# Patient Record
Sex: FEMALE | Race: WHITE | NOT HISPANIC OR LATINO | Employment: PART TIME | URBAN - METROPOLITAN AREA
[De-identification: names, ages, dates, MRNs, and addresses within clinical notes are randomized per-mention and may not be internally consistent; named-entity substitution may affect disease eponyms.]

---
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
## 2024-01-03 NOTE — TELEPHONE ENCOUNTER
Called and spoke with pt  - reports having tonsillectomy on 12/26 without complication  - since surgery she is recovering ok, but reports difficulty swallowing and pain  - currently taking oxycodone, which has led to constipation  - denies any abdominal pain, nausea or vomiting   - no fever, chills or infection concerns  - has post op appt on 1/19  - ND for stelara on 1/4  - will discuss with Dr. Mcdaniel and call pt back

## 2024-01-16 ENCOUNTER — LAB VISIT (OUTPATIENT)
Dept: LAB | Facility: HOSPITAL | Age: 19
End: 2024-01-16
Attending: NURSE PRACTITIONER
Payer: COMMERCIAL

## 2024-01-16 DIAGNOSIS — D50.0 IRON DEFICIENCY ANEMIA DUE TO CHRONIC BLOOD LOSS: ICD-10-CM

## 2024-01-16 LAB
BASOPHILS # BLD AUTO: 0.08 K/UL (ref 0–0.2)
BASOPHILS NFR BLD: 0.9 % (ref 0–1.9)
DIFFERENTIAL METHOD BLD: ABNORMAL
EOSINOPHIL # BLD AUTO: 0.3 K/UL (ref 0–0.5)
EOSINOPHIL NFR BLD: 3.1 % (ref 0–8)
ERYTHROCYTE [DISTWIDTH] IN BLOOD BY AUTOMATED COUNT: 14.3 % (ref 11.5–14.5)
FERRITIN SERPL-MCNC: 173 NG/ML (ref 20–300)
HCT VFR BLD AUTO: 39.6 % (ref 37–48.5)
HGB BLD-MCNC: 12.9 G/DL (ref 12–16)
IMM GRANULOCYTES # BLD AUTO: 0.03 K/UL (ref 0–0.04)
IMM GRANULOCYTES NFR BLD AUTO: 0.3 % (ref 0–0.5)
IRON SERPL-MCNC: 83 UG/DL (ref 30–160)
LYMPHOCYTES # BLD AUTO: 2.5 K/UL (ref 1–4.8)
LYMPHOCYTES NFR BLD: 29.1 % (ref 18–48)
MCH RBC QN AUTO: 28.7 PG (ref 27–31)
MCHC RBC AUTO-ENTMCNC: 32.6 G/DL (ref 32–36)
MCV RBC AUTO: 88 FL (ref 82–98)
MONOCYTES # BLD AUTO: 1 K/UL (ref 0.3–1)
MONOCYTES NFR BLD: 11.6 % (ref 4–15)
NEUTROPHILS # BLD AUTO: 4.7 K/UL (ref 1.8–7.7)
NEUTROPHILS NFR BLD: 55 % (ref 38–73)
NRBC BLD-RTO: 0 /100 WBC
PLATELET # BLD AUTO: 742 K/UL (ref 150–450)
PMV BLD AUTO: 9.3 FL (ref 9.2–12.9)
RBC # BLD AUTO: 4.49 M/UL (ref 4–5.4)
SATURATED IRON: 19 % (ref 20–50)
TOTAL IRON BINDING CAPACITY: 431 UG/DL (ref 250–450)
TRANSFERRIN SERPL-MCNC: 291 MG/DL (ref 200–375)
WBC # BLD AUTO: 8.59 K/UL (ref 3.9–12.7)

## 2024-01-16 PROCEDURE — 36415 COLL VENOUS BLD VENIPUNCTURE: CPT | Mod: PO | Performed by: NURSE PRACTITIONER

## 2024-01-16 PROCEDURE — 82728 ASSAY OF FERRITIN: CPT | Performed by: NURSE PRACTITIONER

## 2024-01-16 PROCEDURE — 83540 ASSAY OF IRON: CPT | Performed by: NURSE PRACTITIONER

## 2024-01-16 PROCEDURE — 85025 COMPLETE CBC W/AUTO DIFF WBC: CPT | Performed by: NURSE PRACTITIONER

## 2024-01-18 ENCOUNTER — LAB VISIT (OUTPATIENT)
Dept: LAB | Facility: HOSPITAL | Age: 19
End: 2024-01-18
Attending: NURSE PRACTITIONER
Payer: COMMERCIAL

## 2024-01-18 ENCOUNTER — TELEPHONE (OUTPATIENT)
Dept: GASTROENTEROLOGY | Facility: CLINIC | Age: 19
End: 2024-01-18
Payer: COMMERCIAL

## 2024-01-18 ENCOUNTER — OFFICE VISIT (OUTPATIENT)
Dept: HEMATOLOGY/ONCOLOGY | Facility: CLINIC | Age: 19
End: 2024-01-18
Payer: COMMERCIAL

## 2024-01-18 VITALS
HEART RATE: 83 BPM | BODY MASS INDEX: 23.19 KG/M2 | WEIGHT: 144.31 LBS | DIASTOLIC BLOOD PRESSURE: 68 MMHG | OXYGEN SATURATION: 97 % | SYSTOLIC BLOOD PRESSURE: 105 MMHG | TEMPERATURE: 97 F | HEIGHT: 66 IN

## 2024-01-18 DIAGNOSIS — D75.839 THROMBOCYTOSIS: Primary | ICD-10-CM

## 2024-01-18 DIAGNOSIS — D75.839 THROMBOCYTOSIS: ICD-10-CM

## 2024-01-18 DIAGNOSIS — D69.3 CHRONIC ITP (IDIOPATHIC THROMBOCYTOPENIA): ICD-10-CM

## 2024-01-18 DIAGNOSIS — D50.0 IRON DEFICIENCY ANEMIA DUE TO CHRONIC BLOOD LOSS: ICD-10-CM

## 2024-01-18 PROCEDURE — 99214 OFFICE O/P EST MOD 30 MIN: CPT | Mod: S$GLB,,, | Performed by: NURSE PRACTITIONER

## 2024-01-18 PROCEDURE — 99999 PR PBB SHADOW E&M-EST. PATIENT-LVL V: CPT | Mod: PBBFAC,,, | Performed by: NURSE PRACTITIONER

## 2024-01-18 PROCEDURE — 0027U JAK2 GENE TRGT SEQ ALYS: CPT | Performed by: NURSE PRACTITIONER

## 2024-01-18 PROCEDURE — 81206 BCR/ABL1 GENE MAJOR BP: CPT | Performed by: NURSE PRACTITIONER

## 2024-01-18 PROCEDURE — 3008F BODY MASS INDEX DOCD: CPT | Mod: CPTII,S$GLB,, | Performed by: NURSE PRACTITIONER

## 2024-01-18 PROCEDURE — 81339 MPL GENE SEQ ALYS EXON 10: CPT | Performed by: NURSE PRACTITIONER

## 2024-01-18 PROCEDURE — 81219 CALR GENE COM VARIANTS: CPT | Performed by: NURSE PRACTITIONER

## 2024-01-18 PROCEDURE — 1159F MED LIST DOCD IN RCRD: CPT | Mod: CPTII,S$GLB,, | Performed by: NURSE PRACTITIONER

## 2024-01-18 PROCEDURE — 3074F SYST BP LT 130 MM HG: CPT | Mod: CPTII,S$GLB,, | Performed by: NURSE PRACTITIONER

## 2024-01-18 PROCEDURE — 1160F RVW MEDS BY RX/DR IN RCRD: CPT | Mod: CPTII,S$GLB,, | Performed by: NURSE PRACTITIONER

## 2024-01-18 PROCEDURE — 3078F DIAST BP <80 MM HG: CPT | Mod: CPTII,S$GLB,, | Performed by: NURSE PRACTITIONER

## 2024-01-18 PROCEDURE — 36415 COLL VENOUS BLD VENIPUNCTURE: CPT | Performed by: NURSE PRACTITIONER

## 2024-01-18 PROCEDURE — 81270 JAK2 GENE: CPT | Performed by: NURSE PRACTITIONER

## 2024-01-18 NOTE — TELEPHONE ENCOUNTER
Per Dr. Mcdaniel:  - CBC/CMP Feb/Mar  - ok to schedule virtual Feb/Mar after labs    Immunization record updated to reflect Shingrix and Influenza vaccines.

## 2024-01-18 NOTE — PROGRESS NOTES
Subjective:       Patient ID: Mini Marcus is a 18 y.o. female.    Chief Complaint: review labs.     HPI:a 18 y.o. female with Chron's disease (routine GI follow up. Colonoscopy UTD), presenting for follow-up with history of iron deficiency anemia and chronic ITP. She endorses menorrhagia. She follows with OBGYN on estrogen containing OCP     She has not been taking oral iron due to intolerance.  She has received IV iron therapy in May 2023 noting hives improved with premedication Benadryl.  No respiratory symptoms noted.    In regards to her ITP, Several prior treatments for ITP including IVIG, steroids, Nplate and in December 2022 underwent splenectomy; she is had post splenectomy complications including recurrent infection/strep throat. She has had follow-up with Infectious Disease for post splenectomy vaccinations. She is already seen allergy and immunology no clear immunoglobulin deficiency or history of IVIG.  She also reports follow up with ENT with plans for possible tonsillectomy given recurrent strep throat. Now s/p tonsillectomy 12/2023     Of note patient had bmbx 5/2022 for evaluation of persistent thrombocytopenia. It showed a normocellular marrow (80% total cellularity) with no increased blasts and trilineage hematopoiesis with left-shifted granulopoiesis, minimal erythroid hyperplasia, and marked megakaryocytic hyperplasia without significant dysplasia .      Patient has had recent persistent thrombocytosis thought likely to be multifactorial status post splenectomy, inflammation with Crohn's disease and or iron deficiency.     Today she presents for follow up of her iron deficiency and blood cell count review. Today she feels well overall.     Social History     Socioeconomic History    Marital status: Single   Tobacco Use    Smoking status: Never    Smokeless tobacco: Never   Substance and Sexual Activity    Alcohol use: Never    Drug use: Never    Sexual activity: Never       Past Medical  History:   Diagnosis Date    Chronic constipation     Chronic ITP (idiopathic thrombocytopenia)     Crohn's disease (ileum)     GERD (gastroesophageal reflux disease)     Inflammatory bowel disease     Long-term current use of intravenous immunoglobulin (IVIG)     PONV (postoperative nausea and vomiting)        Family History   Problem Relation Age of Onset    No Known Problems Mother     Asthma Father     Hypertension Father     Gout Father     No Known Problems Brother     Hyperlipidemia Maternal Grandmother     Diabetes Paternal Grandmother     Hypertension Paternal Grandfather     No Known Problems Brother        Past Surgical History:   Procedure Laterality Date    CHOLECYSTECTOMY      patient denies this procedure    COLONOSCOPY N/A 02/13/2023    Procedure: COLONOSCOPY;  Surgeon: Bertram Mcdaniel MD;  Location: [facility] (4TH FLR);  Service: Endoscopy;  Laterality: N/A;  inst via portal  precall confirmed 2/6 EB    COLONOSCOPY N/A 12/19/2023    Procedure: COLONOSCOPY;  Surgeon: Bertram Mcdaniel MD;  [facility]  Service: Endoscopy;  Laterality: N/A;  Ref By: Dr. JENNY Mcdaniel, Arleneep, instr. ent via portal. AC  Prep Specifications:Standard prep - - miralax daily starting 1 week prior and low residue diet week before, Suprep  12/12-lvm for precall-MS  12/12-pt confirmed appt-KPvt    INTRALUMINAL GASTROINTESTINAL TRACT IMAGING VIA CAPSULE N/A 05/05/2021    Procedure: IMAGING PROCEDURE, GI TRACT, INTRALUMINAL, VIA CAPSULE;  Surgeon: Mitchel Humphries RN;  Location: [facility];  Service: Endoscopy;  Laterality: N/A;    ROBOT-ASSISTED SURGICAL REMOVAL OF SPLEEN USING DA MARYANA XI N/A 12/15/2022    Procedure: XI ROBOTIC SPLENECTOMY;  Surgeon: Mitchel Simmons Jr., MD;  Location: [address];  Service: General;  Laterality: N/A;  CONSENT IN AM    SPINE SURGERY      rods    SPLENECTOMY      12/16/2022    TONSILLECTOMY, ADENOIDECTOMY      TONSILLECTOMY, ADENOIDECTOMY Bilateral  12/26/2023    Procedure: TONSILLECTOMY AND ADENOIDECTOMY;  Surgeon: Keisha Ventura MD;  Location: [facility];  Service: ENT;  Laterality: Bilateral;  send tonsils for path       Review of Systems   Constitutional:  Negative for appetite change, chills, fatigue, fever and unexpected weight change.   HENT:  Negative for congestion, mouth sores, nosebleeds, sore throat, trouble swallowing and voice change.    Respiratory:  Negative for cough, chest tightness, shortness of breath and wheezing.    Cardiovascular:  Negative for chest pain and leg swelling.   Gastrointestinal:  Negative for abdominal distention, abdominal pain, blood in stool, constipation, diarrhea, nausea and vomiting.   Genitourinary:  Negative for difficulty urinating, dysuria and hematuria.   Musculoskeletal:  Negative for arthralgias, back pain and myalgias.   Skin:  Negative for pallor, rash and wound.   Allergic/Immunologic: Positive for immunocompromised state.   Neurological:  Negative for dizziness, syncope, weakness and headaches.   Hematological:  Negative for adenopathy. Does not bruise/bleed easily.   Psychiatric/Behavioral:  The patient is nervous/anxious.          Medication List with Changes/Refills   Current Medications    ASPIRIN 81 MG CAP    Take 81 mg by mouth.    AZATHIOPRINE (IMURAN) 50 MG TAB    Take 3 tablets (150 mg total) by mouth once daily.    AZELASTINE (ASTELIN) 137 MCG (0.1 %) NASAL SPRAY    1 spray (137 mcg total) by Nasal route 2 (two) times daily.    CLINDAMYCIN (CLEOCIN T) 1 % EXTERNAL SOLUTION    APPLY EVERY DAY use as spot treatment    CLONAZEPAM (KLONOPIN) 0.5 MG TABLET    Take 1 tablet (0.5 mg total) by mouth daily as needed for Anxiety.    ESCITALOPRAM OXALATE (LEXAPRO) 10 MG TABLET    Take 1 tablet (10 mg total) by mouth once daily.    ESOMEPRAZOLE (NEXIUM) 20 MG CAPSULE    Take 20 mg by mouth.    FLUTICASONE PROPIONATE (FLONASE) 50 MCG/ACTUATION NASAL SPRAY    1 spray (50 mcg total) by Each Nostril route 2 (two)  times a day.    GALCANEZUMAB-GNLM 120 MG/ML PNIJ    Inject 120 mg into the skin every 28 days. 240 mg loading dose (administered as two consecutive injections of 120 mg each)    LEVOCETIRIZINE (XYZAL) 5 MG TABLET    Take 1 tablet (5 mg total) by mouth every evening.    LINACLOTIDE (LINZESS) 145 MCG CAP CAPSULE    Take 1 capsule (145 mcg total) by mouth before breakfast.    LINACLOTIDE (LINZESS) 72 MCG CAP CAPSULE    Take 1 capsule (72 mcg total) by mouth before breakfast.    MUPIROCIN (BACTROBAN) 2 % OINTMENT    APPLY on excoriated lesions TWICE DAILY    NORETHINDRONE-ETHINYL ESTRADIOL-IRON (MICROGESTIN FE1.5/30) 1.5 MG-30 MCG (21)/75 MG (7) TABLET    Take 1 tablet by mouth every evening.    OMEPRAZOLE (PRILOSEC) 20 MG CAPSULE    Take 20 mg by mouth once daily.    ONDANSETRON (ZOFRAN ODT) 4 MG TBDL    Take 1 tablet (4 mg total) by mouth every 6 (six) hours as needed (nausea).    ONDANSETRON (ZOFRAN-ODT) 4 MG TBDL    Take 1 tablet (4 mg total) by mouth every 6 (six) hours as needed (Nausea).    QUETIAPINE (SEROQUEL) 50 MG TABLET    Take 1 tablet (50 mg total) by mouth every evening.    SULFACETAMIDE SODIUM-SULFUR 10-5 % (W/W) CLSR    APPLY a small amount to skin once a day]    UBROGEPANT (UBRELVY) 100 MG TABLET    Take 1 tablet (100 mg total) by mouth as needed for Migraine (Max 2-3 times a week). If symptoms persist or return, may repeat dose after 2 hours. Maximum: 200 mg per 24 hours    USTEKINUMAB (STELARA) 90 MG/ML SYRG SYRINGE    Inject 1 mL (90 mg total) into the skin every 8 weeks.    ZILXI 1.5 % FOAM    Apply topically every evening.     Objective:     Vitals:    01/18/24 1439   BP: 105/68   Pulse: 83   Temp: 97.2 °F (36.2 °C)     Lab Results   Component Value Date    WBC 8.59 01/16/2024    HGB 12.9 01/16/2024    HCT 39.6 01/16/2024    MCV 88 01/16/2024     (H) 01/16/2024       BMP  Lab Results   Component Value Date     12/06/2023    K 3.7 12/06/2023     12/06/2023    CO2 22 (L)  12/06/2023    BUN 10 12/06/2023    CREATININE 0.8 12/06/2023    CALCIUM 9.4 12/06/2023    ANIONGAP 10 12/06/2023    EGFRNORACEVR >60.0 12/06/2023     Lab Results   Component Value Date    ALT 18 12/06/2023    AST 16 12/06/2023    ALKPHOS 74 12/06/2023    BILITOT 0.3 12/06/2023         Physical Exam  Vitals reviewed.   Constitutional:       Appearance: She is well-developed.   HENT:      Head: Normocephalic.      Right Ear: External ear normal.      Left Ear: External ear normal.      Nose: Nose normal.   Eyes:      General: Lids are normal. No scleral icterus.        Right eye: No discharge.         Left eye: No discharge.      Conjunctiva/sclera: Conjunctivae normal.   Neck:      Thyroid: No thyroid mass.   Cardiovascular:      Rate and Rhythm: Normal rate and regular rhythm.      Heart sounds: Normal heart sounds.   Pulmonary:      Effort: Pulmonary effort is normal. No respiratory distress.   Abdominal:      General: There is no distension.   Genitourinary:     Comments: deferred  Musculoskeletal:         General: Normal range of motion.      Cervical back: Normal range of motion.   Skin:     General: Skin is warm and dry.   Neurological:      Mental Status: She is alert and oriented to person, place, and time.   Psychiatric:         Speech: Speech normal.         Behavior: Behavior normal. Behavior is cooperative.         Thought Content: Thought content normal.        Assessment:     Problem List Items Addressed This Visit          Hematology    Chronic ITP (idiopathic thrombocytopenia)     S/p splenectomy given had failed several prior treatments. Now with thrombocytosis previously improving s/p Injectafer IV iron infusions x 2 administered 5/2023    Most recent CBC with worsening thrombocytosis. Recent tonsillectomy 12/2023 possibly contributing. She notes menorrhagia. Saturated iron 19%. Intolerant of oral iron due to constipation, chronic GI issues     Trial Feraheme x 1. Check Jak2, MPN, BCR-ABL. Plan for  8 weeks f/u with cbc, iron, ferritin, cmp. If no improvement in thrombocytosis at follow up consider repeat bmbx.     Of note prior bmbx 5/2022 5/31/22 for evaluation of thrombocytopenia. It showed a normocellular marrow (80% total cellularity) with no increased blasts and trilineage hematopoiesis with left-shifted granulopoiesis, minimal erythroid hyperplasia, and marked megakaryocytic hyperplasia without significant dysplasia .              Oncology    Iron deficiency anemia due to chronic blood loss    Relevant Orders    CBC Auto Differential    Comprehensive Metabolic Panel    Iron and TIBC    Ferritin    Thrombocytosis - Primary     H/o ITP now with thrombocytosis s/p splenectomy. H/o KASHIF. ? Multifactorial but will r/o myeloproliferative disorder. Labs today. Communicate results via patient portal. Replete iron. Continue ASA 81 mg PO daily. F/u 8 weeks with cbc cmp iron ferritin. Repeat bmbx PRN         Relevant Orders    BCR/ABL, p210, Quant, Monitor, blood    MPN (JAK2 V617F)WITH REFLEX TO CALR,MPL    JAK2 Exon 12 And Other Non-V617 Mutation Detection, Bld    CBC Auto Differential    Comprehensive Metabolic Panel    Iron and TIBC    Ferritin         Plan:     Thrombocytosis  -     BCR/ABL, p210, Quant, Monitor, blood; Future; Expected date: 01/18/2024  -     MPN (JAK2 V617F)WITH REFLEX TO CALR,MPL; Future; Expected date: 01/18/2024  -     JAK2 Exon 12 And Other Non-V617 Mutation Detection, Bld; Future; Expected date: 01/18/2024  -     CBC Auto Differential; Future; Expected date: 01/18/2024  -     Comprehensive Metabolic Panel; Future; Expected date: 01/18/2024  -     Iron and TIBC; Future; Expected date: 01/18/2024  -     Ferritin; Future; Expected date: 01/18/2024    Chronic ITP (idiopathic thrombocytopenia)    Iron deficiency anemia due to chronic blood loss  -     CBC Auto Differential; Future; Expected date: 01/18/2024  -     Comprehensive Metabolic Panel; Future; Expected date: 01/18/2024  -     Iron  and TIBC; Future; Expected date: 01/18/2024  -     Ferritin; Future; Expected date: 01/18/2024            Med Onc Chart Routing      Follow up with physician    Follow up with JOSEPH 2 months.   Infusion scheduling note    Injection scheduling note    Labs CBC, ferritin, iron and TIBC and CMP   Scheduling:  Preferred lab:  Lab interval:  1-2 days prior   Imaging None      Pharmacy appointment No pharmacy appointment needed      Other referrals       No additional referrals needed         SUKH MoralesC

## 2024-01-18 NOTE — ASSESSMENT & PLAN NOTE
H/o ITP now with thrombocytosis s/p splenectomy. H/o KASHIF. ? Multifactorial but will r/o myeloproliferative disorder. Labs today. Communicate results via patient portal. Replete iron. Continue ASA 81 mg PO daily. F/u 8 weeks with cbc cmp iron ferritin. Repeat bmbx PRN

## 2024-01-18 NOTE — ASSESSMENT & PLAN NOTE
S/p splenectomy given had failed several prior treatments. Now with thrombocytosis previously improving s/p Injectafer IV iron infusions x 2 administered 5/2023    Most recent CBC with worsening thrombocytosis. Recent tonsillectomy 12/2023 possibly contributing. She notes menorrhagia. Saturated iron 19%. Intolerant of oral iron due to constipation, chronic GI issues     Trial Feraheme x 1. Check Jak2, MPN, BCR-ABL. Plan for 8 weeks f/u with cbc, iron, ferritin, cmp. If no improvement in thrombocytosis at follow up consider repeat bmbx.     Of note prior bmbx 5/2022 5/31/22 for evaluation of thrombocytopenia. It showed a normocellular marrow (80% total cellularity) with no increased blasts and trilineage hematopoiesis with left-shifted granulopoiesis, minimal erythroid hyperplasia, and marked megakaryocytic hyperplasia without significant dysplasia .

## 2024-01-19 ENCOUNTER — OFFICE VISIT (OUTPATIENT)
Dept: OTOLARYNGOLOGY | Facility: CLINIC | Age: 19
End: 2024-01-19
Payer: COMMERCIAL

## 2024-01-19 DIAGNOSIS — Z90.89 S/P TONSILLECTOMY AND ADENOIDECTOMY: Primary | ICD-10-CM

## 2024-01-19 PROCEDURE — 99024 POSTOP FOLLOW-UP VISIT: CPT | Mod: S$GLB,,, | Performed by: NURSE PRACTITIONER

## 2024-01-19 PROCEDURE — 1159F MED LIST DOCD IN RCRD: CPT | Mod: CPTII,S$GLB,, | Performed by: NURSE PRACTITIONER

## 2024-01-19 PROCEDURE — 1160F RVW MEDS BY RX/DR IN RCRD: CPT | Mod: CPTII,S$GLB,, | Performed by: NURSE PRACTITIONER

## 2024-01-19 PROCEDURE — 99999 PR PBB SHADOW E&M-EST. PATIENT-LVL III: CPT | Mod: PBBFAC,,, | Performed by: NURSE PRACTITIONER

## 2024-01-19 NOTE — PROGRESS NOTES
Subjective     Patient ID: Mini Marcus is a 18 y.o. female.    Chief Complaint: No chief complaint on file.    HPI  Patient had adenotonsillectomy done on 12/26/2023 by Dr. Ventura for recurrent strep tonsillitis. Patient is immune-compromised following spleenectomy and has an immunologist in [place]. Patient states she has recovered well without event. Still feels some PND/mucus sensation in the back of her throat. Some sneezing. She takes Xyzal for ARS symptoms.     Review of Systems   Constitutional: Negative.    HENT: Negative.     Eyes: Negative.    Respiratory: Negative.     Cardiovascular: Negative.    Gastrointestinal: Negative.    Musculoskeletal: Negative.    Integumentary:  Negative.   Neurological: Negative.    Hematological: Negative.    Psychiatric/Behavioral: Negative.            Objective     Physical Exam  Vitals and nursing note reviewed.   Constitutional:       General: She is not in acute distress.     Appearance: She is well-developed. She is not ill-appearing.   HENT:      Head: Normocephalic and atraumatic.      Right Ear: Hearing, tympanic membrane, ear canal and external ear normal. No middle ear effusion. Tympanic membrane is not erythematous.      Left Ear: Hearing, tympanic membrane, ear canal and external ear normal.  No middle ear effusion. Tympanic membrane is not erythematous.      Nose: Nose normal.      Mouth/Throat:      Tonsils: 0 on the right. 0 on the left.   Eyes:      General: Lids are normal. No scleral icterus.        Right eye: No discharge.         Left eye: No discharge.   Neck:      Trachea: Trachea normal. No tracheal deviation.   Cardiovascular:      Rate and Rhythm: Normal rate.   Pulmonary:      Effort: Pulmonary effort is normal. No respiratory distress.      Breath sounds: No stridor. No wheezing.   Musculoskeletal:         General: Normal range of motion.      Cervical back: Normal range of motion and neck supple.   Skin:     General: Skin is warm and dry.    Neurological:      Mental Status: She is alert and oriented to person, place, and time.      Coordination: Coordination normal.      Gait: Gait normal.   Psychiatric:         Attention and Perception: Attention normal.         Mood and Affect: Mood normal.         Speech: Speech normal.         Behavior: Behavior normal. Behavior is cooperative.            Assessment and Plan     1. S/P tonsillectomy and adenoidectomy      Reassured area healed, no restrictions. If recurrent infections resume, f/u with immunologist. Patient encouraged to return to clinic if symptoms worsen/persist and as needed for further ENT symptoms or concerns.          No follow-ups on file.

## 2024-01-22 LAB
BCR/ABL,P210 RESULT: NORMAL
PATH REPORT.FINAL DX SPEC: NORMAL
SPECIMEN TYPE: NORMAL

## 2024-01-23 ENCOUNTER — PATIENT MESSAGE (OUTPATIENT)
Dept: HEMATOLOGY/ONCOLOGY | Facility: CLINIC | Age: 19
End: 2024-01-23
Payer: COMMERCIAL

## 2024-01-25 ENCOUNTER — OFFICE VISIT (OUTPATIENT)
Dept: PSYCHIATRY | Facility: CLINIC | Age: 19
End: 2024-01-25
Payer: COMMERCIAL

## 2024-01-25 ENCOUNTER — PATIENT MESSAGE (OUTPATIENT)
Dept: HEMATOLOGY/ONCOLOGY | Facility: CLINIC | Age: 19
End: 2024-01-25
Payer: COMMERCIAL

## 2024-01-25 DIAGNOSIS — F41.1 GAD (GENERALIZED ANXIETY DISORDER): Primary | ICD-10-CM

## 2024-01-25 LAB
JAK2 EXON 12 MUTATION DETECTION BLOOD: NORMAL
MPNR  FINAL DIAGNOSIS: NORMAL
MPNR  SPECIMEN TYPE: NORMAL
MPNR RESULT: NORMAL
PATH REPORT.FINAL DX SPEC: NORMAL

## 2024-01-25 PROCEDURE — 99214 OFFICE O/P EST MOD 30 MIN: CPT | Mod: 95,,, | Performed by: NURSE PRACTITIONER

## 2024-01-25 PROCEDURE — 1159F MED LIST DOCD IN RCRD: CPT | Mod: CPTII,95,, | Performed by: NURSE PRACTITIONER

## 2024-01-25 PROCEDURE — 1160F RVW MEDS BY RX/DR IN RCRD: CPT | Mod: CPTII,95,, | Performed by: NURSE PRACTITIONER

## 2024-01-25 RX ORDER — HEPARIN 100 UNIT/ML
500 SYRINGE INTRAVENOUS
OUTPATIENT
Start: 2024-01-25

## 2024-01-25 RX ORDER — CLONAZEPAM 0.5 MG/1
0.5 TABLET ORAL DAILY PRN
Qty: 15 TABLET | Refills: 0 | Status: SHIPPED | OUTPATIENT
Start: 2024-01-25 | End: 2024-03-07 | Stop reason: SDUPTHER

## 2024-01-25 RX ORDER — FLUOXETINE HYDROCHLORIDE 20 MG/1
20 CAPSULE ORAL DAILY
Qty: 30 CAPSULE | Refills: 2 | Status: SHIPPED | OUTPATIENT
Start: 2024-01-25 | End: 2024-03-07 | Stop reason: SDUPTHER

## 2024-01-25 RX ORDER — SODIUM CHLORIDE 0.9 % (FLUSH) 0.9 %
10 SYRINGE (ML) INJECTION
Status: CANCELLED | OUTPATIENT
Start: 2024-01-25

## 2024-01-25 NOTE — PROGRESS NOTES
"Outpatient Psychiatry Follow-Up Visit    Clinical Status of Patient: Outpatient (Virtual))  01/25/2024    [address] [place] [address]  [phone] (M)  [phone] (H)  Visit type: Virtual visit with synchronous audio and video  Each patient to whom he or she provides medical services by telemedicine is:  (1) informed of the relationship between the physician and patient and the respective role of any other health care provider with respect to management of the patient; and (2) notified that he or she may decline to receive medical services by telemedicine and may withdraw from such care at any time.    Chief Complaint: Pt is a 19 yo F who presents today for a follow-up. Met with patient.       Interval History and Content of Current Session:  Interim Events/Subjective Report/Content of Current Session:  follow up appointment.    Pt is a 19 yo F with past psychiatric hx of "illness anxiety" who presents for follow up treatment. She is currently taking Seroquel 50mg QHS (insomnia), Klonopin 0.5mg, and started trial of Lexapro 10mg QD for anxiety last visit one month ago. Today, pt reports "I have brain fog. I feel more forgetful and I'm feeling stupid." Pt notes improved anxiety, less generalized worrying and less restlessness. She is sleeping better compared to baseline. Pt is requesting a switch to alternative SSRI at this time. She is stable and high functioning overall.           Past Psychiatric hx: Pt. is a 19 yo F with a past psychiatric hx of "illness anxiety disorder" and "mdd" presenting for initial eval and med mgmt. PT presented to me taking Zoloft 75mg QD (has been taking for around 1.5 years ago through pediatric hematologist) and denies any past med trials.  Denies hx of inpatient psych, denies past suicidal behaviors.     Pt reports suffering from chronic ITP since 7th grade and has undergone significant treatments for this. Pt notes that she recently had her spleen removed. Pt notes she " "was initially prescribed zoloft at age 17 secondary to anxiety/stress secondary to her medical diagnoses. This did have a positive impact on her mood and anxiety levels. She also notes being treated with high dose steroids over the course of a year which caused many setbacks in regards to her mental health. In addition, pt notes undergoing a spinal fusion at age 15 which was followed by a year long recovery. She d/c her Zoloft and noticed increasing depression and anxiety. Pt acknowledges that the majority of her symptoms are secondary to her extensive medical history.      Lately, she notes significant nighttime anxiety, restlessness, tension that interferes with her ability to fall and stay asleep. "I feel a huge sense of panic". Notes daily generalized, ruminative worry. Sleep is poor. "I just need to sleep. I am so exhausted. I am so tired all day."      Past Medical hx:   Past Medical History:   Diagnosis Date    Chronic constipation     Chronic ITP (idiopathic thrombocytopenia)     Crohn's disease (ileum)     GERD (gastroesophageal reflux disease)     Inflammatory bowel disease     Long-term current use of intravenous immunoglobulin (IVIG)     PONV (postoperative nausea and vomiting)         Interim hx:  Medication changes last visit:   start remeron  Anxiety: contd  Depression: denies     Denies suicidal/homicidal ideations.  Denies hopelessness/worthlessness.    Denies auditory/visual hallucinations      Susbtance use: denies      Review of Systems   PSYCHIATRIC: Pertinent items are noted in the narrative.        CONSTITUTIONAL: weight stable        M/S: no pain today         ENT: no allergies noted today        ABD: no n/v/d     Past Medical, Family and Social History: The patient's past medical, family and social history have been reviewed and updated as appropriate within the electronic medical record. See encounter notes.          Compliance: yes      Side effects: tolerates     Risk Parameters:  Patient " "reports no suicidal ideation  Patient reports no homicidal ideation  Patient reports no self-injurious behavior  Patient reports no violent behavior     Exam (detailed: at least 9 elements; comprehensive: all 15 elements)   Constitutional  Vitals:  Most recent vital signs, dated less than 90 days prior to this appointment, were reviewed.       General:  unremarkable, age appropriate, casual attire, good eye contact, good rapport       Musculoskeletal  Muscle Strength/Tone:  no flaccidity, no tremor    Gait & Station:  normal      Psychiatric                       Speech:  normal tone, normal rate, rhythm, and volume   Mood & Affect:   Euthymic, congruent, appropriate         Thought Process:   Goal directed; Linear    Associations:   intact   Thought Content:   No SI/HI, delusions, or paranoia, no AV/VH   Insight & Judgement:   Good, adequate to circumstances   Orientation:   grossly intact; alert and oriented x 4    Memory:  intact for content of interview    Language:  grossly intact, can repeat    Attention Span  : Grossly intact for content of interview   Fund of Knowledge:   intact and appropriate to age and level of education        Assessment and Diagnosis   Status/Progress: Based on the examination today, the patient's problem(s) is/are under fair control.  New problems have not been presented today. Comorbidities are not currently complicating management of the primary condition.      Impression:      Pt is a 17 yo F with past psychiatric hx of "illness anxiety" who presents for follow up treatment. She is currently taking Seroquel 50mg QHS (insomnia), Klonopin 0.5mg, and started trial of Lexapro 10mg QD for anxiety last visit one month ago. Today, pt reports "I have brain fog. I feel more forgetful and I'm feeling stupid." Pt notes improved anxiety, less generalized worrying and less restlessness. She is sleeping better compared to baseline. Pt is requesting a switch to alternative SSRI at this time. She " is stable and high functioning overall.         Diagnosis: illness anxiety disworder    Intervention/Counseling/Treatment Plan   Medication Management:      D/C Lexapro. Start Prozac 20mg QD.  Discussed potential for GI side effects, sexual dysfunction, mood destabilization, headaches    2. Cont Seroquel 50mg QHS. Typical MILAGROS's reviewed including weight gain, abnormal movements, EPS, TD, metabolic side effects.     3. Cont Klonopin 0.5mg QHS for sleep/anxiety. Discussed risk of decreased RT, sedation, addictive potential, and not to mix with alcohol.      4. Call to report any worsening of symptoms or problems with the medication. Pt instructed to go to ER with thoughts of harming self, others     5. Patient given contact # for psychotherapists at [facility] and also instructed she may check with insurance for list of providers.      6. Labs: no new orders      Return to clinic: 6 weeks - self schedule      -Spent 30min face to face with the pt; >50% time spent in counseling   -Supportive therapy and psychoeducation provided  -R/B/SE's of medications discussed with the pt who expresses understanding and chooses to take medications as prescribed.   -Pt instructed to call clinic, 911 or go to nearest emergency room if sxs worsen or pt is in   crisis. The pt expresses understanding.    Carlos Durán, NP

## 2024-01-29 RX ORDER — DIPHENHYDRAMINE HCL 25 MG
25 CAPSULE ORAL
Status: CANCELLED
Start: 2024-01-29

## 2024-01-29 RX ORDER — DIPHENHYDRAMINE HCL 25 MG
25 CAPSULE ORAL
Start: 2024-02-02

## 2024-01-29 RX ORDER — HEPARIN 100 UNIT/ML
500 SYRINGE INTRAVENOUS
OUTPATIENT
Start: 2024-02-02

## 2024-01-29 RX ORDER — SODIUM CHLORIDE 0.9 % (FLUSH) 0.9 %
10 SYRINGE (ML) INJECTION
OUTPATIENT
Start: 2024-02-02

## 2024-01-31 ENCOUNTER — PATIENT MESSAGE (OUTPATIENT)
Dept: PSYCHIATRY | Facility: CLINIC | Age: 19
End: 2024-01-31
Payer: COMMERCIAL

## 2024-02-01 ENCOUNTER — INFUSION (OUTPATIENT)
Dept: INFUSION THERAPY | Facility: HOSPITAL | Age: 19
End: 2024-02-01
Attending: INTERNAL MEDICINE
Payer: COMMERCIAL

## 2024-02-01 VITALS
DIASTOLIC BLOOD PRESSURE: 57 MMHG | SYSTOLIC BLOOD PRESSURE: 109 MMHG | RESPIRATION RATE: 16 BRPM | HEART RATE: 88 BPM | OXYGEN SATURATION: 100 % | TEMPERATURE: 98 F

## 2024-02-01 DIAGNOSIS — D50.0 IRON DEFICIENCY ANEMIA DUE TO CHRONIC BLOOD LOSS: ICD-10-CM

## 2024-02-01 DIAGNOSIS — N92.1 MENORRHAGIA WITH IRREGULAR CYCLE: Primary | ICD-10-CM

## 2024-02-01 DIAGNOSIS — D69.3 CHRONIC ITP (IDIOPATHIC THROMBOCYTOPENIA): ICD-10-CM

## 2024-02-01 PROCEDURE — 63600175 PHARM REV CODE 636 W HCPCS: Mod: JZ,JG | Performed by: NURSE PRACTITIONER

## 2024-02-01 PROCEDURE — 96375 TX/PRO/DX INJ NEW DRUG ADDON: CPT

## 2024-02-01 PROCEDURE — 96365 THER/PROPH/DIAG IV INF INIT: CPT

## 2024-02-01 PROCEDURE — 25000003 PHARM REV CODE 250: Performed by: NURSE PRACTITIONER

## 2024-02-01 RX ORDER — SODIUM CHLORIDE 0.9 % (FLUSH) 0.9 %
10 SYRINGE (ML) INJECTION
Status: DISCONTINUED | OUTPATIENT
Start: 2024-02-01 | End: 2024-02-01 | Stop reason: HOSPADM

## 2024-02-01 RX ORDER — HEPARIN 100 UNIT/ML
5 SYRINGE INTRAVENOUS
OUTPATIENT
Start: 2024-02-08

## 2024-02-01 RX ORDER — DIPHENHYDRAMINE HYDROCHLORIDE 50 MG/ML
25 INJECTION INTRAMUSCULAR; INTRAVENOUS ONCE
OUTPATIENT
Start: 2024-02-08 | End: 2024-02-08

## 2024-02-01 RX ORDER — EPINEPHRINE 0.3 MG/.3ML
0.3 INJECTION SUBCUTANEOUS ONCE AS NEEDED
OUTPATIENT
Start: 2024-02-08

## 2024-02-01 RX ORDER — SODIUM CHLORIDE 0.9 % (FLUSH) 0.9 %
10 SYRINGE (ML) INJECTION
OUTPATIENT
Start: 2024-02-08

## 2024-02-01 RX ORDER — DIPHENHYDRAMINE HYDROCHLORIDE 50 MG/ML
25 INJECTION INTRAMUSCULAR; INTRAVENOUS
Status: CANCELLED
Start: 2024-02-01

## 2024-02-01 RX ORDER — SODIUM CHLORIDE 9 MG/ML
INJECTION, SOLUTION INTRAVENOUS CONTINUOUS
OUTPATIENT
Start: 2024-02-08

## 2024-02-01 RX ORDER — DIPHENHYDRAMINE HCL 25 MG
25 CAPSULE ORAL
Status: DISCONTINUED | OUTPATIENT
Start: 2024-02-01 | End: 2024-02-01

## 2024-02-01 RX ORDER — DIPHENHYDRAMINE HYDROCHLORIDE 50 MG/ML
25 INJECTION INTRAMUSCULAR; INTRAVENOUS
Status: COMPLETED | OUTPATIENT
Start: 2024-02-01 | End: 2024-02-01

## 2024-02-01 RX ORDER — DIPHENHYDRAMINE HYDROCHLORIDE 50 MG/ML
25 INJECTION INTRAMUSCULAR; INTRAVENOUS
Start: 2024-02-02

## 2024-02-01 RX ADMIN — SODIUM CHLORIDE: 9 INJECTION, SOLUTION INTRAVENOUS at 01:02

## 2024-02-01 RX ADMIN — FERUMOXYTOL 510 MG: 510 INJECTION INTRAVENOUS at 01:02

## 2024-02-01 RX ADMIN — DIPHENHYDRAMINE HYDROCHLORIDE 25 MG: 50 INJECTION, SOLUTION INTRAMUSCULAR; INTRAVENOUS at 01:02

## 2024-02-01 NOTE — NURSING
Began to complain of a throbbing headache 20 mins post iron infusion during wait time, continued IV saline infusion and encouraged pt to increase fluid intake.  Pt denies hives , SOB, difficulty breathing or chest tightness.

## 2024-02-01 NOTE — PLAN OF CARE
Discussed plan of care with pt. Addressed any and ongoing concerns. Pt denies   Problem: Adult Inpatient Plan of Care  Goal: Plan of Care Review  Outcome: Ongoing, Progressing  Goal: Patient-Specific Goal (Individualized)  Outcome: Ongoing, Progressing  Flowsheets (Taken 2/1/2024 1342)  Anxieties, Fears or Concerns: Denies  Individualized Care Needs: Reclined position, mom at side pillow provided  Goal: Absence of Hospital-Acquired Illness or Injury  Outcome: Ongoing, Progressing  Intervention: Identify and Manage Fall Risk  Flowsheets (Taken 2/1/2024 1342)  Safety Promotion/Fall Prevention: in recliner, wheels locked  Intervention: Prevent Infection  Flowsheets (Taken 2/1/2024 1342)  Infection Prevention:   equipment surfaces disinfected   hand hygiene promoted   personal protective equipment utilized  Goal: Optimal Comfort and Wellbeing  Outcome: Ongoing, Progressing  Intervention: Provide Person-Centered Care  Flowsheets (Taken 2/1/2024 1342)  Trust Relationship/Rapport:   care explained   questions encouraged   choices provided   reassurance provided   emotional support provided   thoughts/feelings acknowledged   empathic listening provided   questions answered

## 2024-02-26 ENCOUNTER — PATIENT MESSAGE (OUTPATIENT)
Dept: NEUROLOGY | Facility: CLINIC | Age: 19
End: 2024-02-26

## 2024-02-26 ENCOUNTER — OFFICE VISIT (OUTPATIENT)
Dept: NEUROLOGY | Facility: CLINIC | Age: 19
End: 2024-02-26
Payer: COMMERCIAL

## 2024-02-26 VITALS
HEART RATE: 98 BPM | OXYGEN SATURATION: 99 % | BODY MASS INDEX: 23.3 KG/M2 | WEIGHT: 145 LBS | RESPIRATION RATE: 16 BRPM | HEIGHT: 66 IN | DIASTOLIC BLOOD PRESSURE: 75 MMHG | SYSTOLIC BLOOD PRESSURE: 109 MMHG

## 2024-02-26 DIAGNOSIS — R20.0 NUMBNESS AND TINGLING IN LEFT HAND: ICD-10-CM

## 2024-02-26 DIAGNOSIS — F41.1 GAD (GENERALIZED ANXIETY DISORDER): ICD-10-CM

## 2024-02-26 DIAGNOSIS — J32.9 SINUSITIS, UNSPECIFIED CHRONICITY, UNSPECIFIED LOCATION: ICD-10-CM

## 2024-02-26 DIAGNOSIS — F19.982 DRUG-INDUCED INSOMNIA: ICD-10-CM

## 2024-02-26 DIAGNOSIS — N92.1 MENORRHAGIA WITH IRREGULAR CYCLE: ICD-10-CM

## 2024-02-26 DIAGNOSIS — T38.0X5A ADRENAL CORTICAL STEROIDS CAUSING ADVERSE EFFECT IN THERAPEUTIC USE: ICD-10-CM

## 2024-02-26 DIAGNOSIS — G43.119 CLASSICAL MIGRAINE WITH INTRACTABLE MIGRAINE WITH AURA: Primary | ICD-10-CM

## 2024-02-26 DIAGNOSIS — Z90.81 H/O SPLENECTOMY: ICD-10-CM

## 2024-02-26 DIAGNOSIS — M41.125 ADOLESCENT IDIOPATHIC SCOLIOSIS OF THORACOLUMBAR REGION: ICD-10-CM

## 2024-02-26 DIAGNOSIS — Z87.898 HISTORY OF TRANSFUSION REACTION: ICD-10-CM

## 2024-02-26 DIAGNOSIS — K50.00 CROHN'S DISEASE OF SMALL INTESTINE WITHOUT COMPLICATION: ICD-10-CM

## 2024-02-26 DIAGNOSIS — D50.0 IRON DEFICIENCY ANEMIA DUE TO CHRONIC BLOOD LOSS: ICD-10-CM

## 2024-02-26 DIAGNOSIS — D69.3 CHRONIC ITP (IDIOPATHIC THROMBOCYTOPENIA): ICD-10-CM

## 2024-02-26 DIAGNOSIS — D75.839 THROMBOCYTOSIS: ICD-10-CM

## 2024-02-26 DIAGNOSIS — J30.1 SEASONAL ALLERGIC RHINITIS DUE TO POLLEN: ICD-10-CM

## 2024-02-26 DIAGNOSIS — R20.2 NUMBNESS AND TINGLING IN LEFT HAND: ICD-10-CM

## 2024-02-26 PROCEDURE — 1159F MED LIST DOCD IN RCRD: CPT | Mod: CPTII,S$GLB,, | Performed by: PSYCHIATRY & NEUROLOGY

## 2024-02-26 PROCEDURE — 3074F SYST BP LT 130 MM HG: CPT | Mod: CPTII,S$GLB,, | Performed by: PSYCHIATRY & NEUROLOGY

## 2024-02-26 PROCEDURE — 3078F DIAST BP <80 MM HG: CPT | Mod: CPTII,S$GLB,, | Performed by: PSYCHIATRY & NEUROLOGY

## 2024-02-26 PROCEDURE — 99214 OFFICE O/P EST MOD 30 MIN: CPT | Mod: S$GLB,,, | Performed by: PSYCHIATRY & NEUROLOGY

## 2024-02-26 PROCEDURE — 3008F BODY MASS INDEX DOCD: CPT | Mod: CPTII,S$GLB,, | Performed by: PSYCHIATRY & NEUROLOGY

## 2024-02-26 PROCEDURE — 99999 PR PBB SHADOW E&M-EST. PATIENT-LVL V: CPT | Mod: PBBFAC,,, | Performed by: PSYCHIATRY & NEUROLOGY

## 2024-02-26 RX ORDER — LASMIDITAN 100 MG/1
1 TABLET ORAL NIGHTLY PRN
Qty: 9 TABLET | Refills: 3 | Status: SHIPPED | OUTPATIENT
Start: 2024-02-26 | End: 2024-04-01 | Stop reason: SDUPTHER

## 2024-02-26 NOTE — PROGRESS NOTES
"Subjective:       Patient ID: Mini Marcus is a 18 y.o. female.    Chief Complaint: Classical migraine with intractable migraine with aura          HPI      BACKGROUND HISTORY       Medical history is extensive and included ITP,CD, Splenectomy and Scoliosis surgery.     The patient presented for evaluation of headaches. The headaches started around 2020. The headaches progress from different areas and involves different sides with a throbbing nature. It can involve the right side, the left side or both sides. The headaches is preceded by loss of peripheral vision visual aura for 30 minutes. No associated neurological deficits. .  The headaches are getting more frequent and come every day. The headaches are also getting more severe 6-8/10 headaches that cause significant morbidity. The headache is associated with nausea and light, sound, temperature, and smell sensitivity. The patient also vomits occasionally.  The patient feels "down" during the headache with no racing. Physical and emotional stressors provoke and aggravate the headache.  The headache builds up slowly towards the middle of the day or the end of the day.   The headaches are progressively getting worse and interfering with daily activity.  The headache is associated with scalp sensitivity. Lack of sleep could be a trigger of the headache.  Does endorse neck pain with radiation. No TMJ problems.  The patient denies blurry vision and ear ringing. The headache is not positional or postural but worsens with movement and Valsalva. Sleep help lessen the headache. No history of head trauma. No history of seizures. No history of smoking. No caffeine overuse. No vertigo. No blacking out.  No fever, chills, or rigors. No history of significant memory loss. No history of strokes.  The patient cannot think of food that aggravates the headache. Family history is remarkable for migraine (female side). No family history of early dementia. On 02-, " 10-, 04- [facility] so I ordered Brain MRI. Abortive therapies (tried and failed): OTC NSAIDs, Triptans (Sumatriptan-Imitrex), Zolmitriptan (Zomig) and Rizatriptan (Maxalt), Fioricet, CGRP Blockers (Nurtec) so I tried her on Ubrelvy 100 mg PO PRN. Preventative therapies (tried and failed):  AEDs (TPM, GBP), TCA (Amitriptyline), SSRIs, BB (Propranolol/inderal) so I tried her on Emgality 120 mg SQ monthly.         The patient also reports paroxysmal LUE numbness and tingling in the little finger (pinky) distribution that radiates to the neck. It was reproducible by carpal tunnel and cubital tunnel percussion. I ordered NCS/EMG LUE.      INTERVAL HISTORY     On 07- Brain MRI [facility]. Took Emgality once in  and eradicated her headaches. Ubrelvy 100 mg PO PRN was not covered and inconsistent.    The LUE numbness subsided.     Review of Systems   Constitutional:  Positive for fatigue. Negative for appetite change.   HENT:  Negative for hearing loss and tinnitus.    Eyes:  Negative for photophobia and visual disturbance.   Respiratory:  Negative for apnea and shortness of breath.    Cardiovascular:  Negative for chest pain and palpitations.   Gastrointestinal:  Positive for constipation. Negative for nausea and vomiting.   Endocrine: Negative for cold intolerance and heat intolerance.   Genitourinary:  Negative for difficulty urinating and urgency.   Musculoskeletal:  Positive for myalgias and neck pain. Negative for arthralgias, back pain, gait problem, joint swelling and neck stiffness.   Skin:  Negative for color change and rash.   Allergic/Immunologic: Positive for immunocompromised state. Negative for environmental allergies.   Neurological:  Positive for numbness and headaches. Negative for dizziness, tremors, seizures, syncope, facial asymmetry, speech difficulty, weakness and light-headedness.   Hematological:  Negative for adenopathy. Does not bruise/bleed easily.   Psychiatric/Behavioral:   Positive for dysphoric mood and sleep disturbance. Negative for agitation, behavioral problems, confusion, decreased concentration, hallucinations, self-injury and suicidal ideas. The patient is nervous/anxious. The patient is not hyperactive.                Current Outpatient Medications:     aspirin 81 mg Cap, Take 81 mg by mouth., Disp: , Rfl:     azaTHIOprine (IMURAN) 50 mg Tab, Take 3 tablets (150 mg total) by mouth once daily., Disp: 30 tablet, Rfl: 2    azelastine (ASTELIN) 137 mcg (0.1 %) nasal spray, 1 spray (137 mcg total) by Nasal route 2 (two) times daily., Disp: 30 mL, Rfl: 11    clindamycin (CLEOCIN T) 1 % external solution, APPLY EVERY DAY use as spot treatment, Disp: , Rfl:     clonazePAM (KLONOPIN) 0.5 MG tablet, Take 1 tablet (0.5 mg total) by mouth daily as needed for Anxiety., Disp: 15 tablet, Rfl: 0    esomeprazole (NEXIUM) 20 MG capsule, Take 20 mg by mouth., Disp: , Rfl:     FLUoxetine (PROZAC) 20 MG capsule, Take 1 capsule (20 mg total) by mouth once daily., Disp: 30 capsule, Rfl: 2    fluticasone propionate (FLONASE) 50 mcg/actuation nasal spray, 1 spray (50 mcg total) by Each Nostril route 2 (two) times a day., Disp: 16 g, Rfl: 11    levocetirizine (XYZAL) 5 MG tablet, Take 1 tablet (5 mg total) by mouth every evening., Disp: 90 tablet, Rfl: 3    linaCLOtide (LINZESS) 145 mcg Cap capsule, Take 1 capsule (145 mcg total) by mouth before breakfast., Disp: 30 capsule, Rfl: 0    linaCLOtide (LINZESS) 72 mcg Cap capsule, Take 1 capsule (72 mcg total) by mouth before breakfast., Disp: 30 capsule, Rfl: 5    mupirocin (BACTROBAN) 2 % ointment, APPLY on excoriated lesions TWICE DAILY, Disp: , Rfl:     omeprazole (PRILOSEC) 20 MG capsule, Take 20 mg by mouth once daily., Disp: , Rfl:     ondansetron (ZOFRAN ODT) 4 MG TbDL, Take 1 tablet (4 mg total) by mouth every 6 (six) hours as needed (nausea)., Disp: 10 tablet, Rfl: 0    ondansetron (ZOFRAN-ODT) 4 MG TbDL, Take 1 tablet (4 mg total) by mouth every  6 (six) hours as needed (Nausea)., Disp: 20 tablet, Rfl: 1    QUEtiapine (SEROQUEL) 50 MG tablet, Take 1 tablet (50 mg total) by mouth every evening., Disp: 30 tablet, Rfl: 2    sulfacetamide sodium-sulfur 10-5 % (w/w) Clsr, APPLY a small amount to skin once a day], Disp: , Rfl:     ustekinumab (STELARA) 90 mg/mL Syrg syringe, Inject 1 mL (90 mg total) into the skin every 8 weeks., Disp: 1 mL, Rfl: 2    ZILXI 1.5 % Foam, Apply topically every evening., Disp: , Rfl:     galcanezumab-gnlm 120 mg/mL PnIj, Inject 1 mL (120 mg total) into the skin every 28 days., Disp: 3 each, Rfl: 11    lasmiditan (REYVOW) tablet 100 mg, Take 1 tablet by mouth nightly as needed (Max 2-3 times a week)., Disp: 9 tablet, Rfl: 3    norethindrone-ethinyl estradiol-iron (MICROGESTIN FE1.5/30) 1.5 mg-30 mcg (21)/75 mg (7) tablet, Take 1 tablet by mouth every evening., Disp: , Rfl:   Past Medical History:   Diagnosis Date    Chronic constipation     Chronic ITP (idiopathic thrombocytopenia)     Crohn's disease (ileum)     GERD (gastroesophageal reflux disease)     Inflammatory bowel disease     Long-term current use of intravenous immunoglobulin (IVIG)     PONV (postoperative nausea and vomiting)      Past Surgical History:   Procedure Laterality Date    CHOLECYSTECTOMY      patient denies this procedure    COLONOSCOPY N/A 02/13/2023    Procedure: COLONOSCOPY;  Surgeon: Bertram Mcdaniel MD;  Location: [address]);  Service: Endoscopy;  Laterality: N/A;  inst via portal  precall confirmed 2/6 EB    COLONOSCOPY N/A 12/19/2023    Procedure: COLONOSCOPY;  Surgeon: Bertram Mcdaniel MD;  Location: [facility] ([address] [place] [address]);  Service: Endoscopy;  Laterality: N/A;  Ref By: Dr. JENNY Mcdaniel, Sergio, instr. ent via portal. AC  Prep Specifications:Standard prep - - miralax daily starting 1 week prior and low residue diet week before, Suprep  12/12-lvm for precall-MS  12/12-pt confirmed appt-KPvt    INTRALUMINAL GASTROINTESTINAL TRACT IMAGING VIA CAPSULE N/A  05/05/2021    Procedure: IMAGING PROCEDURE, GI TRACT, INTRALUMINAL, VIA CAPSULE;  Surgeon: Mitchel Humphries RN;  Location: [facility];  Service: Endoscopy;  Laterality: N/A;    ROBOT-ASSISTED SURGICAL REMOVAL OF SPLEEN USING DA MARYANA XI N/A 12/15/2022    Procedure: XI ROBOTIC SPLENECTOMY;  Surgeon: Mitchel Simmons Jr., MD;  Location: [facility] OR [address] [place] [address];  Service: General;  Laterality: N/A;  CONSENT IN AM    SPINE SURGERY      rods    SPLENECTOMY      12/16/2022    TONSILLECTOMY, ADENOIDECTOMY      TONSILLECTOMY, ADENOIDECTOMY Bilateral 12/26/2023    Procedure: TONSILLECTOMY AND ADENOIDECTOMY;  Surgeon: Keisha Ventura MD;  Location: [facility] OR;  Service: ENT;  Laterality: Bilateral;  send tonsils for path     Social History     Socioeconomic History    Marital status: Single   Tobacco Use    Smoking status: Never    Smokeless tobacco: Never   Substance and Sexual Activity    Alcohol use: Never    Drug use: Never    Sexual activity: Never             Past/Current Medical/Surgical History, Past/Current Social History, Past/Current Family History and Past/Current Medications were reviewed in detail.        Objective:           VITAL SIGNS WERE REVIEWED      GENERAL APPEARANCE:     The patient looks comfortable.    BMI 23.40    No signs of respiratory distress.    Normal breathing pattern.    No dysmorphic features    Normal eye contact.       GENERAL MEDICAL EXAM:    HEENT:  Head is atraumatic normocephalic.     FUNDUSCOPIC (OPHTHALMOSCOPIC) EXAMINATION showed no disc edema.      NECK: No JVD. No visible lesions or goiters.     CHEST-CARDIOPULMONARY: No cyanosis. No tachypnea. Normal respiratory effort.    [id number]-AYCMOPVZEP: No jaundice. No stomas or lesions. No visible hernias. No catheters.     SKIN, HAIR, NAILS: No pathognomonic skin rash.No neurofibromatosis. No visible lesions.No stigmata of autoimmune disease. No clubbing.    LIMBS: No varicose veins. No visible swelling.    MUSCULOSKELETAL: No  visible deformities.No visible lesions.           Neurologic Exam     Mental Status   Oriented to person, place, and time.   Follows 3 step commands.   Attention: normal. Concentration: normal.   Speech: speech is normal   Level of consciousness: alert  Able to name object. Able to repeat. Normal comprehension.     Cranial Nerves   Cranial nerves II through XII intact.     CN II   Visual fields full to confrontation.   Visual acuity: normal  Right visual field deficit: none  Left visual field deficit: none     CN III, IV, VI   Pupils are equal, round, and reactive to light.  Extraocular motions are normal.   Right pupil: Size: 2 mm. Shape: regular. Reactivity: brisk. Consensual response: intact. Accommodation: intact.   Left pupil: Size: 2 mm. Shape: regular. Reactivity: brisk. Consensual response: intact. Accommodation: intact.   CN III: no CN III palsy  CN VI: no CN VI palsy  Nystagmus: none   Diplopia: none  Ophthalmoparesis: none  Upgaze: normal  Downgaze: normal  Conjugate gaze: present  Vestibulo-ocular reflex: present    CN V   Facial sensation intact.   Right facial sensation deficit: none  Left facial sensation deficit: none  Jaw jerk: normal    CN VII   Facial expression full, symmetric.   Right facial weakness: none  Left facial weakness: none    CN VIII   CN VIII normal.   Hearing: intact    CN IX, X   CN IX normal.   CN X normal.   Palate: symmetric    CN XI   CN XI normal.   Right sternocleidomastoid strength: normal  Left sternocleidomastoid strength: normal  Right trapezius strength: normal  Left trapezius strength: normal    CN XII   CN XII normal.   Tongue: not atrophic  Fasciculations: absent  Tongue deviation: none    Motor Exam   Muscle bulk: normal  Overall muscle tone: normal  Right arm tone: normal  Left arm tone: normal  Right arm pronator drift: absent  Left arm pronator drift: absent  Right leg tone: normal  Left leg tone: normal    Strength   Strength 5/5 throughout.   Right neck flexion:  5/5  Left neck flexion: 5/5  Right neck extension: 5/5  Left neck extension: 5/5  Right deltoid: 5/5  Left deltoid: 5/5  Right biceps: 5/5  Left biceps: 5/5  Right triceps: 5/5  Left triceps: 5/5  Right wrist flexion: 5/5  Left wrist flexion: 5/5  Right wrist extension: 5/5  Left wrist extension: 5/5  Right interossei: 5/5  Left interossei: 5/5  Right iliopsoas: 5/5  Left iliopsoas: 5/5  Right quadriceps: 5/5  Left quadriceps: 5/5  Right hamstrin/5  Left hamstrin/5  Right glutei: 5/5  Left glutei: 5/5  Right anterior tibial: 5/5  Left anterior tibial: 5/5  Right posterior tibial: 5/5  Left posterior tibial: 5/5  Right peroneal: 5/5  Left peroneal: 5/5  Right gastroc: 5/5  Left gastroc: 5/5    Sensory Exam   Light touch normal.   Right arm light touch: normal  Left arm light touch: normal  Right leg light touch: normal  Left leg light touch: normal  Vibration normal.   Right arm vibration: normal  Left arm vibration: normal  Right leg vibration: normal  Left leg vibration: normal  Proprioception normal.   Right arm proprioception: normal  Left arm proprioception: normal  Right leg proprioception: normal  Left leg proprioception: normal  Pinprick normal.   Right arm pinprick: normal  Left arm pinprick: normal  Right leg pinprick: normal  Left leg pinprick: normal  Sensory deficit distribution on left: ulnar (The patient also reports paroxysmal LUE numbness and tingling in the little finger (pinky) distribution that radiates to the neck. It was reproducible by carpal tunnel and cubital tunnel percussion.)  Graphesthesia: normal  Stereognosis: normal    Gait, Coordination, and Reflexes     Gait  Gait: normal    Coordination   Romberg: negative  Finger to nose coordination: normal  Heel to shin coordination: normal  Tandem walking coordination: normal    Tremor   Resting tremor: absent  Intention tremor: absent  Action tremor: absent    Reflexes   Right brachioradialis: 2+  Left brachioradialis: 2+  Right biceps:  2+  Left biceps: 2+  Right triceps: 2+  Left triceps: 2+  Right patellar: 2+  Left patellar: 2+  Right achilles: 2+  Left achilles: 2+  Right plantar: normal  Left plantar: normal  Right Howell: absent  Left Howell: absent  Right ankle clonus: absent  Left ankle clonus: absent  Right pendular knee jerk: absent  Left pendular knee jerk: absent          Lab Results   Component Value Date    WBC 8.59 01/16/2024    HGB 12.9 01/16/2024    HCT 39.6 01/16/2024    MCV 88 01/16/2024     (H) 01/16/2024     Sodium   Date Value Ref Range Status   12/06/2023 138 136 - 145 mmol/L Final     Potassium   Date Value Ref Range Status   12/06/2023 3.7 3.5 - 5.1 mmol/L Final     Chloride   Date Value Ref Range Status   12/06/2023 106 95 - 110 mmol/L Final     CO2   Date Value Ref Range Status   12/06/2023 22 (L) 23 - 29 mmol/L Final     Glucose   Date Value Ref Range Status   12/06/2023 103 70 - 110 mg/dL Final     BUN   Date Value Ref Range Status   12/06/2023 10 6 - 20 mg/dL Final     Creatinine   Date Value Ref Range Status   12/06/2023 0.8 0.5 - 1.4 mg/dL Final     Calcium   Date Value Ref Range Status   12/06/2023 9.4 8.7 - 10.5 mg/dL Final     Total Protein   Date Value Ref Range Status   12/06/2023 7.3 6.0 - 8.4 g/dL Final     Albumin   Date Value Ref Range Status   12/06/2023 3.9 3.5 - 5.2 g/dL Final     Total Bilirubin   Date Value Ref Range Status   12/06/2023 0.3 0.1 - 1.0 mg/dL Final     Comment:     For infants and newborns, interpretation of results should be based  on gestational age, weight and in agreement with clinical  observations.    Premature Infant recommended reference ranges:  Up to 24 hours.............<8.0 mg/dL  Up to 48 hours............<12.0 mg/dL  3-5 days..................<15.0 mg/dL  6-29 days.................<15.0 mg/dL       Alkaline Phosphatase   Date Value Ref Range Status   12/06/2023 74 55 - 135 U/L Final     AST   Date Value Ref Range Status   12/06/2023 16 10 - 40 U/L Final     ALT    Date Value Ref Range Status   12/06/2023 18 10 - 44 U/L Final     Anion Gap   Date Value Ref Range Status   12/06/2023 10 8 - 16 mmol/L Final     eGFR if    Date Value Ref Range Status   07/18/2022 >60.0 >60 mL/min/1.73 m^2 Final     eGFR if non    Date Value Ref Range Status   07/18/2022 >60.0 >60 mL/min/1.73 m^2 Final     Comment:     Calculation used to obtain the estimated glomerular filtration  rate (eGFR) is the CKD-EPI equation.        Lab Results   Component Value Date    [id number] 337 09/06/2023     Lab Results   Component Value Date    TSH 0.365 (L) 04/18/2022    FREET4 1.10 04/18/2022       RADIOLOGICAL EVALUATION       02-, 10-, 04-      CT NL      07-    Brain MRI [facility]       Reviewed the neuroimaging independently     Assessment:       1. Classical migraine with intractable migraine with aura    2. Thrombocytosis    3. Seasonal allergic rhinitis due to pollen    4. Numbness and tingling in left hand    5. Menorrhagia with irregular cycle    6. Iron deficiency anemia due to chronic blood loss    7. History of transfusion reaction    8. H/O splenectomy    9. VELASQUEZ (generalized anxiety disorder)    10. Drug-induced insomnia    11. Crohn's disease (ileum)    12. Chronic ITP (idiopathic thrombocytopenia)    13. Adrenal cortical steroids causing adverse effect in therapeutic use    14. Adolescent idiopathic scoliosis of thoracolumbar region    15. Sinusitis, unspecified chronicity, unspecified location        Plan:           CLASSICAL MIGRAINE WITH VISUAL AURA, EPISODIC, VERY HIGH FREQUENCY (22-30 HEADACHES A MONTH)          HEADACHE DIARY     DISCUSSED THE THREE-FOLD MANAGEMENT OF MIGRAINE:      LIFESTYLE CHANGES:       Good sleep hygiene  Avoid general triggers like lack of sleep/too much sleep, prolonged sun exposure, excessive screen time and specific triggers based on you own diary   Minimize physical and emotional stress  Smoking avoidance  and cessation  Limit caffeine drinks to 1-2 a day   Good hydration   Small frequent meals and avoid skipping meals   Moderate 30-minute-long aerobic exercises 3 times/week. Avoid strenuous exercise         ABORTIVE MEDICATIONS (ACUTE-RESCUE MEDICATIONS):     Should only be taken 2-3 times/week to avoid rebound and overuse headaches.    Ditans: Reyvow (lasmiditan) 100 mg (No driving due to sedation)        Abortive therapies (tried and failed): OTC NSAIDs, Triptans (Sumatriptan-Imitrex), Zolmitriptan (Zomig) and Rizatriptan (Maxalt), Fioricet, CGRP Blockers (Nurtec, Ubrelvy)        AVOID NARCOTICS (OPIATES)      1. No randomized controlled study shows pain-free results with opioids in the treatment of migraine.     2. The physiologic consequences of opioid use are adverse, occur quickly, and can be permanent. Decreased gray matter, release of calcitonin gene-related peptide, dynorphin, and pro-inflammatory peptides, and activation of excitatory glutamate receptors are all associated with opioid exposure.     3. Opioids are pro-nociceptive, prevent reversal of migraine central sensitization, and interfere with triptan effectiveness.     4.Opioids precipitate bad clinical outcomes, especially transformation to daily headache.     5. They cause disease progression, comorbidity, and excessive health care consumption.           NEXT OPTIONS:      DHE NS Trudhesa (Max 2 a week)     C/I: concomitant use of vasoconstrictors like Triptans, strong CY inhibitors such as HAART PIs (eg, ritonavir, nelfinavir, or indinavir) and Macrolides (eg, erythromycin or clarithromycin), CAD, PVD, Stroke/TIA and Uncontrolled HTN.  Serious SEs include Vasospasm and Fibrosis (chronic use).           PREVENTATIVE (MORE ACCURATELY MIGRAINE REDUCTION) MEDICATIONS:         Restart Galcanezumab (Emgality) 120 mg SQ Pen and take it monthly.        Preventative therapies (tried and failed):  AEDs (TPM, GBP), TCA (Amitriptyline), SSRIs, BB  (Propranolol/inderal)       HELPFUL SUPPLEMENTS:     Helpful supplements include Co-Q 10, B2, Mg, Feverfew (Dolovent combination) and butterbur (Petadolex)        NEUROPHARMACOLOGY     NEXT OPTIONS:     Zonisamide/Zonegran (ZNS) 100-400 mg QHS is a good alternative to TPM in case of SE/AE.     Lamotrigine/Lamictal  (LTG)slow titration to 100 mg BID which can cause serious skin rash and rare cardiac arrhythmias. LTG is superior to other therapies for specifically reducing migraine aura.     ANTI-CGRP AGENTS: Qulipta (alogepant) 60 mg QD, Erenumab (Aimovig) 140 mg SQ Pen monthly (Reported cases of Constipation and BP elevation) and Fremnezumab (Ajovy) (Ligand Blocker): 225 mg SQ monthly or 675 mg every 3 months     Botox 200 units every 3 months .        LAST RESORT OPTIONS:      Namenda 10 mg BID     Valproic acid/ Depakote         NEUROMODULATION     Cefaly, Relivion, Nerivio and GammaCore (VNS)           WOMEN IN CHILD BEARING PERIOD     All migraine medications are not safe during pregnancy and the patient was made aware of this fact. Any pregnancy should be planned, and medications should be stopped PRIOR to pregnancy planning. Folic acid 1 mg daily was recommended. However, hormonal birth control complicates the management of migraine and can exacerbate migraine. If possible, mechanical contraception should be a better option.               PAROXYSMAL LEFT HAND PARESTHESIAS       Monitor clinically.             MEDICAL/SURGICAL COMORBIDITIES     All relevant medical comorbidities noted and managed by primary care physician and medical care team.      Referred to Hematology for management of CITP, Anemia and Splenectomy.       HEALTHY LIFESTYLE AND PREVENTATIVE CARE    The patient to adhere to the age-appropriate health maintenance guidelines including screening tests and vaccinations. The patient to adhere to  healthy lifestyle, optimal weight, exercise, healthy diet, good sleep hygiene and avoiding drugs  including smoking, alcohol and recreational drugs.        RTC ANNUALLY               Cynthia Crouch MD, FAAN    Attending Neurologist/Epileptologist         Diplomate, American Board of Psychiatry and Neurology    Diplomate, American Board of Clinical Neurophysiology     Fellow, American Academy of Neurology     I spent a total of 30 minutes on the day of the visit.  This includes face to face time and non-face to face time preparing to see the patient (eg, review of tests), obtaining and/or reviewing separately obtained history, documenting clinical information in the electronic or other health record, independently interpreting results and communicating results to the patient/family/caregiver, or care coordinator.

## 2024-03-05 ENCOUNTER — LAB VISIT (OUTPATIENT)
Dept: LAB | Facility: HOSPITAL | Age: 19
End: 2024-03-05
Attending: INTERNAL MEDICINE
Payer: COMMERCIAL

## 2024-03-05 DIAGNOSIS — K50.00 CROHN'S DISEASE OF SMALL INTESTINE WITHOUT COMPLICATION: ICD-10-CM

## 2024-03-05 LAB
ALBUMIN SERPL BCP-MCNC: 4.5 G/DL (ref 3.5–5.2)
ALP SERPL-CCNC: 94 U/L (ref 55–135)
ALT SERPL W/O P-5'-P-CCNC: 37 U/L (ref 10–44)
ANION GAP SERPL CALC-SCNC: 13 MMOL/L (ref 8–16)
AST SERPL-CCNC: 28 U/L (ref 10–40)
BASOPHILS # BLD AUTO: 0.11 K/UL (ref 0–0.2)
BASOPHILS NFR BLD: 1.2 % (ref 0–1.9)
BILIRUB SERPL-MCNC: 0.4 MG/DL (ref 0.1–1)
BUN SERPL-MCNC: 12 MG/DL (ref 6–20)
CALCIUM SERPL-MCNC: 9.8 MG/DL (ref 8.7–10.5)
CHLORIDE SERPL-SCNC: 102 MMOL/L (ref 95–110)
CO2 SERPL-SCNC: 21 MMOL/L (ref 23–29)
CREAT SERPL-MCNC: 0.8 MG/DL (ref 0.5–1.4)
DIFFERENTIAL METHOD BLD: ABNORMAL
EOSINOPHIL # BLD AUTO: 0.2 K/UL (ref 0–0.5)
EOSINOPHIL NFR BLD: 2.1 % (ref 0–8)
ERYTHROCYTE [DISTWIDTH] IN BLOOD BY AUTOMATED COUNT: 13.5 % (ref 11.5–14.5)
EST. GFR  (NO RACE VARIABLE): >60 ML/MIN/1.73 M^2
GLUCOSE SERPL-MCNC: 81 MG/DL (ref 70–110)
HCT VFR BLD AUTO: 45.1 % (ref 37–48.5)
HGB BLD-MCNC: 14.6 G/DL (ref 12–16)
IMM GRANULOCYTES # BLD AUTO: 0.03 K/UL (ref 0–0.04)
IMM GRANULOCYTES NFR BLD AUTO: 0.3 % (ref 0–0.5)
LYMPHOCYTES # BLD AUTO: 3.2 K/UL (ref 1–4.8)
LYMPHOCYTES NFR BLD: 36.1 % (ref 18–48)
MCH RBC QN AUTO: 28.5 PG (ref 27–31)
MCHC RBC AUTO-ENTMCNC: 32.4 G/DL (ref 32–36)
MCV RBC AUTO: 88 FL (ref 82–98)
MONOCYTES # BLD AUTO: 0.8 K/UL (ref 0.3–1)
MONOCYTES NFR BLD: 9.3 % (ref 4–15)
NEUTROPHILS # BLD AUTO: 4.5 K/UL (ref 1.8–7.7)
NEUTROPHILS NFR BLD: 51 % (ref 38–73)
NRBC BLD-RTO: 0 /100 WBC
PLATELET # BLD AUTO: 587 K/UL (ref 150–450)
PMV BLD AUTO: 9.3 FL (ref 9.2–12.9)
POTASSIUM SERPL-SCNC: 3.8 MMOL/L (ref 3.5–5.1)
PROT SERPL-MCNC: 7.9 G/DL (ref 6–8.4)
RBC # BLD AUTO: 5.12 M/UL (ref 4–5.4)
SODIUM SERPL-SCNC: 136 MMOL/L (ref 136–145)
WBC # BLD AUTO: 8.91 K/UL (ref 3.9–12.7)

## 2024-03-05 PROCEDURE — 36415 COLL VENOUS BLD VENIPUNCTURE: CPT | Mod: PO | Performed by: INTERNAL MEDICINE

## 2024-03-05 PROCEDURE — 80053 COMPREHEN METABOLIC PANEL: CPT | Performed by: INTERNAL MEDICINE

## 2024-03-05 PROCEDURE — 85025 COMPLETE CBC W/AUTO DIFF WBC: CPT | Performed by: INTERNAL MEDICINE

## 2024-03-06 ENCOUNTER — OFFICE VISIT (OUTPATIENT)
Dept: GASTROENTEROLOGY | Facility: CLINIC | Age: 19
End: 2024-03-06
Payer: COMMERCIAL

## 2024-03-06 VITALS — BODY MASS INDEX: 23.3 KG/M2 | WEIGHT: 145 LBS | HEIGHT: 66 IN

## 2024-03-06 DIAGNOSIS — K59.09 CHRONIC CONSTIPATION: Primary | ICD-10-CM

## 2024-03-06 DIAGNOSIS — K50.00 CROHN'S DISEASE OF SMALL INTESTINE WITHOUT COMPLICATION: ICD-10-CM

## 2024-03-06 PROCEDURE — 1159F MED LIST DOCD IN RCRD: CPT | Mod: CPTII,95,, | Performed by: INTERNAL MEDICINE

## 2024-03-06 PROCEDURE — 1160F RVW MEDS BY RX/DR IN RCRD: CPT | Mod: CPTII,95,, | Performed by: INTERNAL MEDICINE

## 2024-03-06 PROCEDURE — 99215 OFFICE O/P EST HI 40 MIN: CPT | Mod: 95,,, | Performed by: INTERNAL MEDICINE

## 2024-03-06 PROCEDURE — G2211 COMPLEX E/M VISIT ADD ON: HCPCS | Mod: 95,,, | Performed by: INTERNAL MEDICINE

## 2024-03-06 PROCEDURE — 3008F BODY MASS INDEX DOCD: CPT | Mod: CPTII,95,, | Performed by: INTERNAL MEDICINE

## 2024-03-06 NOTE — PATIENT INSTRUCTIONS
- stop imuran  - continue stelara every 8 weeks  - continue linzess- refills sent  - continue dulcolax as needed  - mg citrate if above not helpful  - let me know if you want to see a colon motility specialist in future

## 2024-03-06 NOTE — PROGRESS NOTES
"     [facility]             Inflammatory Bowel Disease   Follow-up  Note              TODAY'S VISIT DATE:  3/6/2024    Chief Complaint:   Chief Complaint   Patient presents with    Crohn's Disease     PCP: Annie Santiago    Previous History:  Mini Marcus is a 19 y.o. with Crohn's disease (ileum), chronic ITP, chronic constipation (on linzess) who was doing well until hospitalization 4/30/2017 at which time she was diagnosed with acute ITP which responded to IVIG and later started on promacta 50 mg/d (family concerned that this worsened abd pain and discontinued 1/2021). In 1/2020 platelets were normal.  For generalized abd pain in 1/2021 pt had KUB and US that were normal and EGD significant for erythema in the duodenal bulb, moderate areas of erythema and nodularity in the stomach, normal esophagus (bx of esophagus normal, stomach HP neg chronic gastritis, duodenum normal) and colonoscopy significant for TI with few small ulcers and erythema (biopsies of colon normal and TI c/w focal erosive ileitis). At that time she was told she had "mild" case of Crohn's disease that did not require treatment and of not was not taking NSAIDs. Had spinal fusion 5/31/2019 and at that time had normal plts. She stayed on promacta varying doses of 25-75 mg/d from [id number]. Labs 1/2021 significant for anemia (hgb 8.5) and thrombocytopenia (plts 36-60k).  She was on nexium and carafate for abd pain with some improvement of symptoms though mom felt that promacta was cause and once discontinued this helped abdominal pain.  On 3/11/21 MRE c/w normal small bowel. In 3/2021 she reported mid abd pain worse with eating and ran out of nexium and taking carafate prn.  She had some mild weight loss with constipation (taking laxatives and recommended to add miralax and eventually started linzess).  Abdominal US 4/7/21 c/w hepatosplenomegaly and transabdominal pelvic US was normal.  On 5/5/21 VCE showed normal SB.  IBD " "panel 5/20/21 c/w myeloperoxidase abs neg, proteinase 3 Ab neg, CRP normal. On 8/27/21 pt had repeat pelvic US normal and doppler abd US c/w hepatosplenomegaly with mildly elevated velocities in the celiac artery that were felt to be incidental and not due to celiac artery stenosis.  On 9/2021 CTA c/w again hepatosplenomegaly and HIDA c/w normal GB EF.  In 10/2021 EGD was normal (on nexium) and colonoscopy "terminal ileum normal and scope carefully withdrawn throughout the colon. There was inflammation in the terminal ileum".  Biopsies of terminal ileum c/w patchy active chronic inflammation, normal colon and normal lower and upper esophagus, stomach with HP neg patchy mild superficial chronic gastritis, normal duodenum. Patient started azathioprine 100 mg/d (increased to 150 mg/d 1 month ago) for terminal ileitis and seen for f/u 12/27/21 with continued left sided abd pain and workup 12/29/21 with US normal and CT A/P hepatosplenomegaly.  Pt was referred for cholecystectomy but surgeon decided not clinically indicated and not recommended. In 9/2021 had IUD placed and removed and while she had it continued vaginal bleeding and KASHIF. She continued left side abdominal pain with no obvious cause. She continued on azathioprine 100 mg/day and 1 month ago it was increased to 150 mg/d. She also continues on linzess 72 mcg every other day for IBS-constipation.  On 1/3/22 labs Hgb 7.6, plts 251, normal CMP. On left side of abdomen, constant soreness with some worsening throughout the day triggered by foods (fried, heavy foods, pizza, salsa).  If she has no BM then may have bloating but no left sided abdominal pain. Patient is having 0-1 soft BMs/d, no blood. Low appetite and would avoid eating due to pain but since starting high dose prednisone appetite improved. Has nausea on "empty stomach."  Pt is currently on prednisone 40 mg po BID for ITP and started 14 day course on 4/14/22. Patient was hospitalized due to vaginal " bleeding and clot pushed IUD and so received IVIG and 1 unit PRBC 4/3 and had 2nd dose of IVIG 4/4/22 and also received TXA IV (clotting medicine) and this was in a setting of platelets 8 k. Pt had headache and imaging of head normal.  She had IVFs, reglan and pain meds.  I met patient for the first time in the IBD Clinic on 4/18/2022 at which time she continued on azathioprine 150 mg/day and linzess 72 mcg qod.  In 12/2022 patient underwent splenectomy for ITP though since then thrombocytosis and requiring aspirin to prevent clots.  In 4/2022 fecal calprotectin 49.5. IN 2/2023 colonoscopy showed liquid stool in entire colon with fair visualization and erosions with apthous ulcers in the terminal ileum and bx c/w moderate chronic active inflammation.      Interval History:  - current IBD meds: azathioprine 150 mg/d, stelara 90 mg SC q 8 weeks (started 5/25/23-had headaches, LD 2/29, ND 7/28, ND 9/14)  - last 2 days constipation- no BM and finally went yesterday and significant abd pain with blood in stool- ducolax qhs, last took linzess yesterday and prior to that had taken it   - tonsillectomy- 12/26/23  - abdominal pain- sharp, left side, intermittent, lasts few hours and sometimes overnight  - 12/2023 colonoscopy: normal colon and 2 small apthous ulcers in the TI. Biopsies of ileum c/w active enteritis which is mild with focal ulceration   - anxiety - stable, on prozac and klonopin, SE night terrors   - for acne- on topical treatment and depo discontinued   - migraines improved with new med  - NSAID use: No  - Narcotic use: No  - Alternative/complementary meds for IBD: No    Prior Pertinent Surgeries:   12/15/22 splenectomy for ITP    Last pertinent Endoscopy/Imaging:  3/11/21 MRE c/w normal small bowel  5/5/21 VCE showed normal SB  8/2021 doppler abd US:  hepatosplenomegaly with mildly elevated velocities in the celiac artery that were felt to be incidental and not due to celiac artery stenosis.  On 9/2021 CTA  "c/w again hepatosplenomegaly and HIDA c/w normal GB EF  10/2021 EGD was normal. normal lower and upper esophagus, stomach with HP neg patchy mild superficial chronic gastritis, normal duodenum  10/2021 colonoscopy:  colonoscopy "terminal ileum normal and scope carefully withdrawn throughout the colon. There was inflammation in the terminal ileum".  Biopsies of terminal ileum c/w patchy active chronic inflammation, normal colon  12/29/21 US abdomen: hepatosplenomegaly   12/29/21 CT A/P:  borderline enlarged spleen.     Therapeutic Drug Monitoring Labs:  None    Prior IBD Therapies:  Azathioprine 150 mg/d    Vaccinations:No results found for: "HEPBSAB"    Lab Results   Component Value Date    HEPBSURFABQU POSITIVE 04/18/2022    HEPBSURFABQU 921 04/18/2022       Lab Results   Component Value Date    HEPAIGG Positive 04/18/2022       Lab Results   Component Value Date    VARICELLAZOS 3.37 (H) 04/18/2022    VARICELLAINT Positive (A) 04/18/2022       Lab Results   Component Value Date    MUMPSIGGSCRE 2.47 (H) 04/18/2022    MUMPSIGGINTE Positive (A) 04/18/2022      Lab Results   Component Value Date    RUBEOLAIGGAN 2.55 (H) 04/18/2022    RUBEOLAINTER Positive (A) 04/18/2022     Immunization History   Administered Date(s) Administered    DTaP 09/14/2005, 03/03/2008    DTaP / Hep B / IPV 2004, 2004, 2004    HIB 2004, 2004, 2004    HPV 9-Valent 11/07/2019, 02/26/2020, 07/09/2020    HiB PRP-T 06/07/2005, 05/17/2022    IPV 03/03/2008    Influenza - Quadrivalent - PF *Preferred* (6 months and older) 12/05/2014, 10/22/2016, 11/03/2017, 11/19/2018, 10/01/2021, 10/26/2023    Influenza - Trivalent (ADULT) 2004, 11/19/2018, 11/01/2019, 11/24/2020    Influenza - Trivalent - PF (ADULT) 11/08/2005, 10/27/2006, 11/11/2008, 10/26/2009, 11/06/2010, 10/08/2011, 10/06/2012, 10/05/2013    MMR 06/07/2005    MMRV 03/03/2008    Meningococcal B, OMV 05/17/2022, 07/18/2022    Meningococcal Conjugate " (MCV4P) 04/03/2015, 03/04/2020    Pneumococcal Conjugate - 13 Valent 05/17/2022    Pneumococcal Conjugate - 7 Valent 2004, 2004, 2004, 03/15/2005    Pneumococcal Polysaccharide - 23 Valent 07/18/2022    Tdap 04/03/2015    Varicella 03/15/2005    Zoster Recombinant 04/05/2023, 09/06/2023   Flu shot: recommended yearly   COVID vaccine/booster:  per CDC recommendations  Tetanus (Tdap):  4/2025  PPSV 20: 7/2027    Review of Systems   Constitutional:  Negative for chills, fever and weight loss.   HENT:          No oral ulcers, dysphagia, oral thrush   Eyes:  Negative for blurred vision, pain and redness.   Respiratory:  Negative for cough and shortness of breath.    Cardiovascular:  Negative for chest pain.   Gastrointestinal:  Positive for abdominal pain and nausea. Negative for heartburn and vomiting.   Genitourinary:  Negative for dysuria and hematuria.   Musculoskeletal:  Negative for back pain and joint pain.   Skin:  Negative for rash.   Psychiatric/Behavioral:  Negative for depression. The patient is nervous/anxious. The patient does not have insomnia.      All Medical History/Surgical History/Family History/Social History/Allergies have been reviewed and updated in EMR    Outpatient Medications Marked as Taking for the 3/6/24 encounter (Office Visit) with Bertram Mcdaniel MD   Medication Sig Dispense Refill    aspirin 81 mg Cap Take 81 mg by mouth once daily.      azaTHIOprine (IMURAN) 50 mg Tab Take 3 tablets (150 mg total) by mouth once daily. 30 tablet 2    azelastine (ASTELIN) 137 mcg (0.1 %) nasal spray 1 spray (137 mcg total) by Nasal route 2 (two) times daily. 30 mL 11    clindamycin (CLEOCIN T) 1 % external solution APPLY EVERY DAY use as spot treatment      clonazePAM (KLONOPIN) 0.5 MG tablet Take 1 tablet (0.5 mg total) by mouth daily as needed for Anxiety. 15 tablet 0    esomeprazole (NEXIUM) 20 MG capsule Take 20 mg by mouth as needed.      FLUoxetine (PROZAC) 20 MG capsule Take 1  capsule (20 mg total) by mouth once daily. 30 capsule 2    fluticasone propionate (FLONASE) 50 mcg/actuation nasal spray 1 spray (50 mcg total) by Each Nostril route 2 (two) times a day. 16 g 11    galcanezumab-gnlm 120 mg/mL PnIj Inject 1 mL (120 mg total) into the skin every 28 days. 3 each 11    linaCLOtide (LINZESS) 72 mcg Cap capsule Take 1 capsule (72 mcg total) by mouth before breakfast. 30 capsule 5    omeprazole (PRILOSEC) 20 MG capsule Take 20 mg by mouth as needed.      ondansetron (ZOFRAN-ODT) 4 MG TbDL Take 1 tablet (4 mg total) by mouth every 6 (six) hours as needed (Nausea). 20 tablet 1    QUEtiapine (SEROQUEL) 50 MG tablet Take 1 tablet (50 mg total) by mouth every evening. 30 tablet 2    sulfacetamide sodium-sulfur 10-5 % (w/w) Clsr APPLY a small amount to skin once a day]      ustekinumab (STELARA) 90 mg/mL Syrg syringe Inject 1 mL (90 mg total) into the skin every 8 weeks. 1 mL 2    ZILXI 1.5 % Foam Apply topically every evening.      [DISCONTINUED] linaCLOtide (LINZESS) 145 mcg Cap capsule Take 1 capsule (145 mcg total) by mouth before breakfast. 30 capsule 0     Vital Signs:  There were no vitals taken for this visit.   Physical Exam    Labs:   Lab Results   Component Value Date    CALPROTECTIN <27.1 06/21/2023     Lab Results   Component Value Date    HEPBSAG Non-reactive 09/06/2023    HEPBCAB Non-reactive 09/06/2023     Lab Results   Component Value Date    TBGOLDPLUS Negative 09/06/2023     Lab Results   Component Value Date    KKKAAPJX01UE 32 04/18/2022    UMALABRJ10 337 09/06/2023     Lab Results   Component Value Date    WBC 8.91 03/05/2024    HGB 14.6 03/05/2024    HCT 45.1 03/05/2024    MCV 88 03/05/2024     (H) 03/05/2024     Lab Results   Component Value Date    CREATININE 0.8 03/05/2024    ALBUMIN 4.5 03/05/2024    BILITOT 0.4 03/05/2024    ALKPHOS 94 03/05/2024    AST 28 03/05/2024    ALT 37 03/05/2024     Assessment/Plan:  Mini Angeline is a 19 y.o. female with Crohn's  disease (ileum), chronic constipation (on linzess), ITP S/P splenectomy with thromobocytosis, KASHIF, migraine HA.     Patient continues on stelara with only 2 small apthous ulcers in the ileum.  I will stop imuran and we will do a f/u colonoscopy 12/2024 or sooner if symptoms change.  She has ongoing struggles with constipation and she will try more regular linzess and use dulcolax as needed. Most recently the dulcolax stopped working so she will use prn. If things are not improving she will let me know and may consider referral to colon motility specialist. She is concerned about her abd pain and I do think this is related to her constipation and should improve once she had more regular BMs    # Chronic constipation:  - take linzess more regularly  - dulcolax prn  - pt to let me know if this is not effective and we can consider workup and referral to specialist     # Crohn's disease (ileum):  - note: thrombocytosis, risk of clots  - stop azathioprine  - continue stelara 90 mg SC q 8 weeks  - stool calprotectin- normal 4/2022  - colonoscopy- 12/2024- suprep, miralax daily 1 week prior, low residue diet, was not able to tolerate golytely in past  - drug monitoring labs: CBC/CMP q 6 mos (9/2024), TPMT (normal 11/2021), Hep B testing (4/2022 HBcAb +/HBsAg/HBV DNA neg, 4/2023 repeat HBsAg/HBcAb neg, due 9/2024), TB quantiferon (9/2024)    # Left sided abdominal pain  - likely functional pain or related to constipation, intermittent and not c/w diverticulitis pain, near area of splenectomy  - if pain does not improve with constipation    # Heme issues- ITP S/P splenectomy and KASHIF  - followed by hematology   - thrombocytosis- on aspirin to prevent clots  - pt has appt with new hematologist 13/15/24    # Immunodeficiency due to long term immunosuppressive drug therapy and IBD specific health maintenance:  CRC risk- ileal disease, average risk  Skin exam yearly - normal 9/2023, next due 9/2024  Risk for  osteopenia/osteoporosis- none  Pap smear yearly- will get one 3/2025  Vitamin D- normal, not on supplementation   Vaccines- S/P splenectomy, no live vaccines    Visit today is associated with current or anticipated ongoing medical care related to this patient's single serious condition/complex condition crohn's disease in setting of splenectomy of ITP, abd pain.     Follow up in about 6 months (around 9/6/2024) for in person.    The patient location is: Home    The chief complaint leading to consultation is: chronic constipation and Crohn's disease    Visit type: audiovisual    Face to Face time with patient: 20 minutes  40 minutes of total time spent on the encounter, which includes face to face time and non-face to face time preparing to see the patient (eg, review of tests), Obtaining and/or reviewing separately obtained history, Documenting clinical information in the electronic or other health record, Independently interpreting results (not separately reported) and communicating results to the patient/family/caregiver, or Care coordination (not separately reported).     Each patient to whom he or she provides medical services by telemedicine is:  (1) informed of the relationship between the physician and patient and the respective role of any other health care provider with respect to management of the patient; and (2) notified that he or she may decline to receive medical services by telemedicine and may withdraw from such care at any time.      Bertram Mcdaniel MD  Department of Gastroenterology  Medical Director, Inflammatory Bowel Disease                Answers submitted by the patient for this visit:  Established Patient Questionnaire  (Submitted on 3/6/2024)

## 2024-03-07 ENCOUNTER — OFFICE VISIT (OUTPATIENT)
Dept: PSYCHIATRY | Facility: CLINIC | Age: 19
End: 2024-03-07
Payer: COMMERCIAL

## 2024-03-07 DIAGNOSIS — F41.1 GAD (GENERALIZED ANXIETY DISORDER): Primary | ICD-10-CM

## 2024-03-07 PROCEDURE — 1160F RVW MEDS BY RX/DR IN RCRD: CPT | Mod: CPTII,95,, | Performed by: NURSE PRACTITIONER

## 2024-03-07 PROCEDURE — 90833 PSYTX W PT W E/M 30 MIN: CPT | Mod: 95,,, | Performed by: NURSE PRACTITIONER

## 2024-03-07 PROCEDURE — 99214 OFFICE O/P EST MOD 30 MIN: CPT | Mod: 95,,, | Performed by: NURSE PRACTITIONER

## 2024-03-07 PROCEDURE — 1159F MED LIST DOCD IN RCRD: CPT | Mod: CPTII,95,, | Performed by: NURSE PRACTITIONER

## 2024-03-07 RX ORDER — PRAZOSIN HYDROCHLORIDE 1 MG/1
1 CAPSULE ORAL NIGHTLY
Qty: 30 CAPSULE | Refills: 1 | Status: SHIPPED | OUTPATIENT
Start: 2024-03-07 | End: 2025-03-07

## 2024-03-07 RX ORDER — CLONAZEPAM 0.5 MG/1
0.5 TABLET ORAL DAILY PRN
Qty: 15 TABLET | Refills: 0 | Status: SHIPPED | OUTPATIENT
Start: 2024-03-07 | End: 2024-04-09 | Stop reason: SDUPTHER

## 2024-03-07 RX ORDER — FLUOXETINE HYDROCHLORIDE 20 MG/1
20 CAPSULE ORAL DAILY
Qty: 30 CAPSULE | Refills: 2 | Status: SHIPPED | OUTPATIENT
Start: 2024-03-07 | End: 2024-05-15

## 2024-03-07 NOTE — PROGRESS NOTES
"Outpatient Psychiatry Follow-Up Visit    Clinical Status of Patient: Outpatient (Virtual))  03/07/2024    [id number] [place] [id number]  [phone] (M)  [phone] (H)  Visit type: Virtual visit with synchronous audio and video  Each patient to whom he or she provides medical services by telemedicine is:  (1) informed of the relationship between the physician and patient and the respective role of any other health care provider with respect to management of the patient; and (2) notified that he or she may decline to receive medical services by telemedicine and may withdraw from such care at any time.    Chief Complaint: Pt is a 19 yo F who presents today for a follow-up. Met with patient.       Interval History and Content of Current Session:  Interim Events/Subjective Report/Content of Current Session:  follow up appointment.    Pt is a 19 yo F with past psychiatric hx of "illness anxiety" who presents for follow up treatment. She is currently taking Seroquel 50mg QHS (insomnia), Klonopin 0.5mg, and switched from Lexapro to Prozc 20mg QD at her last visit with me 1/25/24 due to "brain fog". Today, pt notes significantly improved anxiety and notes fewer instances of generalized worrying throughout the day. Stress tolerance improved. She does continue to have bad dreams/nightmares which is a chronic issue. Has never tried Prazosin but is amenable to trial. Quality of sleep is interrupted. She often wakes up in sweats and is shaking, for which she uses Klonopin. This happens 3-4 times nightly. Her appetite is normal and she is stable otherwise.       Past Psychiatric hx: Pt. is a 19 yo F with a past psychiatric hx of "illness anxiety disorder" and "mdd" presenting for initial eval and med mgmt. PT presented to me taking Zoloft 75mg QD (has been taking for around 1.5 years ago through pediatric hematologist) and denies any past med trials.  Denies hx of inpatient psych, denies past suicidal behaviors.     Pt " "reports suffering from chronic ITP since 7th grade and has undergone significant treatments for this. Pt notes that she recently had her spleen removed. Pt notes she was initially prescribed zoloft at age 17 secondary to anxiety/stress secondary to her medical diagnoses. This did have a positive impact on her mood and anxiety levels. She also notes being treated with high dose steroids over the course of a year which caused many setbacks in regards to her mental health. In addition, pt notes undergoing a spinal fusion at age 15 which was followed by a year long recovery. She d/c her Zoloft and noticed increasing depression and anxiety. Pt acknowledges that the majority of her symptoms are secondary to her extensive medical history.      Lately, she notes significant nighttime anxiety, restlessness, tension that interferes with her ability to fall and stay asleep. "I feel a huge sense of panic". Notes daily generalized, ruminative worry. Sleep is poor. "I just need to sleep. I am so exhausted. I am so tired all day."      Past Medical hx:   Past Medical History:   Diagnosis Date    Chronic constipation     Chronic ITP (idiopathic thrombocytopenia)     Crohn's disease (ileum)     GERD (gastroesophageal reflux disease)     Inflammatory bowel disease     Long-term current use of intravenous immunoglobulin (IVIG)     PONV (postoperative nausea and vomiting)         Interim hx:    Denies suicidal/homicidal ideations.  Denies hopelessness/worthlessness.    Denies auditory/visual hallucinations      Susbtance use: denies      Review of Systems   PSYCHIATRIC: Pertinent items are noted in the narrative.        CONSTITUTIONAL: weight stable        M/S: no pain today         ENT: no allergies noted today        ABD: no n/v/d     Past Medical, Family and Social History: The patient's past medical, family and social history have been reviewed and updated as appropriate within the electronic medical record. See encounter notes.   " "    Compliance: yes           Risk Parameters:  Patient reports no suicidal ideation  Patient reports no homicidal ideation  Patient reports no self-injurious behavior  Patient reports no violent behavior     Exam (detailed: at least 9 elements; comprehensive: all 15 elements)   Constitutional  Vitals:  Most recent vital signs, dated less than 90 days prior to this appointment, were reviewed.       General:  unremarkable, age appropriate, casual attire, good eye contact, good rapport       Musculoskeletal  Muscle Strength/Tone:  no flaccidity, no tremor    Gait & Station:  normal      Psychiatric                       Speech:  normal tone, normal rate, rhythm, and volume   Mood & Affect:   Euthymic, congruent, appropriate         Thought Process:   Goal directed; Linear    Associations:   intact   Thought Content:   No SI/HI, delusions, or paranoia, no AV/VH   Insight & Judgement:   Good, adequate to circumstances   Orientation:   grossly intact; alert and oriented x 4    Memory:  intact for content of interview    Language:  grossly intact, can repeat    Attention Span  : Grossly intact for content of interview   Fund of Knowledge:   intact and appropriate to age and level of education        Assessment and Diagnosis   Status/Progress: Based on the examination today, the patient's problem(s) is/are under fair control.  New problems have not been presented today. Comorbidities are not currently complicating management of the primary condition.      Impression:      Pt is a 19 yo F with past psychiatric hx of "illness anxiety" who presents for follow up treatment. She is currently taking Seroquel 50mg QHS (insomnia), Klonopin 0.5mg, and switched from Lexapro to Prozc 20mg QD at her last visit with me 1/25/24 due to "brain fog". Today, pt notes significantly improved anxiety and notes fewer instances of generalized worrying throughout the day. Stress tolerance improved. She does continue to have bad dreams/nightmares " which is a chronic issue. Has never tried Prazosin but is amenable to trial. Quality of sleep is interrupted. She often wakes up in sweats and is shaking, for which she uses Klonopin. This happens 3-4 times nightly. Her appetite is normal and she is stable otherwise.   Diagnosis: VELASQUEZ    Intervention/Counseling/Treatment Plan   Medication Management:      Cont Prozac 20mg QD. Discussed potential for GI side effects, sexual dysfunction, mood destabilization, headaches    2. Cont Seroquel 50mg QHS. Typical MILAGROS's reviewed including weight gain, abnormal movements, EPS, TD, metabolic side effects.     3. Cont Klonopin 0.5mg QHS for sleep/anxiety. Discussed risk of decreased RT, sedation, addictive potential, and not to mix with alcohol.     4. Trial of Prazosin 1mg QHS for nightmares     4. Call to report any worsening of symptoms or problems with the medication. Pt instructed to go to ER with thoughts of harming self, others     5. Patient given contact # for psychotherapists at [facility] and also instructed she may check with insurance for list of providers.      6. Labs: no new orders      Return to clinic: 4 weeks - self schedule    Psychotherapy:   Target symptoms: inattention/distractibility, anxiety    Why chosen therapy is appropriate versus another modality: relevant to diagnosis, patient responds to this modality  Outcome monitoring methods: self-report, observation, feedback from family   Therapeutic intervention type: supportive psychotherapy  Topics discussed/themes: building skills sets for symptom management, symptom recognition, nutrition, exercise  The patient's response to the intervention is accepting. The patient's progress toward treatment goals is positive progress.  Duration of intervention: 20 minutes     -Spent 30min face to face with the pt; >50% time spent in counseling   -Supportive therapy and psychoeducation provided  -R/B/SE's of medications discussed with the pt who expresses  understanding and chooses to take medications as prescribed.   -Pt instructed to call clinic, 911 or go to nearest emergency room if sxs worsen or pt is in   crisis. The pt expresses understanding.    Carlos Durán, NP

## 2024-03-13 ENCOUNTER — LAB VISIT (OUTPATIENT)
Dept: LAB | Facility: HOSPITAL | Age: 19
End: 2024-03-13
Payer: COMMERCIAL

## 2024-03-13 DIAGNOSIS — D75.839 THROMBOCYTOSIS: Primary | ICD-10-CM

## 2024-03-13 DIAGNOSIS — D75.839 THROMBOCYTOSIS: ICD-10-CM

## 2024-03-13 DIAGNOSIS — D50.0 IRON DEFICIENCY ANEMIA DUE TO CHRONIC BLOOD LOSS: ICD-10-CM

## 2024-03-13 LAB
BASOPHILS # BLD AUTO: 0.1 K/UL (ref 0–0.2)
BASOPHILS NFR BLD: 1.2 % (ref 0–1.9)
DIFFERENTIAL METHOD BLD: ABNORMAL
EOSINOPHIL # BLD AUTO: 0.2 K/UL (ref 0–0.5)
EOSINOPHIL NFR BLD: 2.5 % (ref 0–8)
ERYTHROCYTE [DISTWIDTH] IN BLOOD BY AUTOMATED COUNT: 13.1 % (ref 11.5–14.5)
HCT VFR BLD AUTO: 42.3 % (ref 37–48.5)
HGB BLD-MCNC: 13.8 G/DL (ref 12–16)
IMM GRANULOCYTES # BLD AUTO: 0.03 K/UL (ref 0–0.04)
IMM GRANULOCYTES NFR BLD AUTO: 0.4 % (ref 0–0.5)
LYMPHOCYTES # BLD AUTO: 3.1 K/UL (ref 1–4.8)
LYMPHOCYTES NFR BLD: 38.6 % (ref 18–48)
MCH RBC QN AUTO: 27.8 PG (ref 27–31)
MCHC RBC AUTO-ENTMCNC: 32.6 G/DL (ref 32–36)
MCV RBC AUTO: 85 FL (ref 82–98)
MONOCYTES # BLD AUTO: 0.9 K/UL (ref 0.3–1)
MONOCYTES NFR BLD: 11.3 % (ref 4–15)
NEUTROPHILS # BLD AUTO: 3.7 K/UL (ref 1.8–7.7)
NEUTROPHILS NFR BLD: 46.4 % (ref 38–73)
NRBC BLD-RTO: 0 /100 WBC
PLATELET # BLD AUTO: 592 K/UL (ref 150–450)
PMV BLD AUTO: 9.1 FL (ref 9.2–12.9)
RBC # BLD AUTO: 4.97 M/UL (ref 4–5.4)
WBC # BLD AUTO: 8.06 K/UL (ref 3.9–12.7)

## 2024-03-13 PROCEDURE — 36415 COLL VENOUS BLD VENIPUNCTURE: CPT | Mod: PO | Performed by: NURSE PRACTITIONER

## 2024-03-13 PROCEDURE — 83540 ASSAY OF IRON: CPT | Performed by: NURSE PRACTITIONER

## 2024-03-13 PROCEDURE — 80053 COMPREHEN METABOLIC PANEL: CPT | Performed by: NURSE PRACTITIONER

## 2024-03-13 PROCEDURE — 85025 COMPLETE CBC W/AUTO DIFF WBC: CPT | Mod: PO | Performed by: NURSE PRACTITIONER

## 2024-03-13 PROCEDURE — 82728 ASSAY OF FERRITIN: CPT | Performed by: NURSE PRACTITIONER

## 2024-03-13 RX ORDER — DIPHENOXYLATE HYDROCHLORIDE AND ATROPINE SULFATE 2.5; .025 MG/1; MG/1
1 TABLET ORAL 4 TIMES DAILY PRN
Qty: 30 TABLET | Refills: 1 | Status: SHIPPED | OUTPATIENT
Start: 2024-03-13 | End: 2024-03-13 | Stop reason: CLARIF

## 2024-03-14 LAB
ALBUMIN SERPL BCP-MCNC: 4.2 G/DL (ref 3.5–5.2)
ALP SERPL-CCNC: 86 U/L (ref 55–135)
ALT SERPL W/O P-5'-P-CCNC: 32 U/L (ref 10–44)
ANION GAP SERPL CALC-SCNC: 7 MMOL/L (ref 8–16)
AST SERPL-CCNC: 21 U/L (ref 10–40)
BILIRUB SERPL-MCNC: 0.3 MG/DL (ref 0.1–1)
BUN SERPL-MCNC: 10 MG/DL (ref 6–20)
CALCIUM SERPL-MCNC: 9.9 MG/DL (ref 8.7–10.5)
CHLORIDE SERPL-SCNC: 106 MMOL/L (ref 95–110)
CO2 SERPL-SCNC: 24 MMOL/L (ref 23–29)
CREAT SERPL-MCNC: 0.8 MG/DL (ref 0.5–1.4)
EST. GFR  (NO RACE VARIABLE): >60 ML/MIN/1.73 M^2
FERRITIN SERPL-MCNC: 321 NG/ML (ref 20–300)
GLUCOSE SERPL-MCNC: 67 MG/DL (ref 70–110)
IRON SERPL-MCNC: 154 UG/DL (ref 30–160)
POTASSIUM SERPL-SCNC: 4.4 MMOL/L (ref 3.5–5.1)
PROT SERPL-MCNC: 7.1 G/DL (ref 6–8.4)
SATURATED IRON: 44 % (ref 20–50)
SODIUM SERPL-SCNC: 137 MMOL/L (ref 136–145)
TOTAL IRON BINDING CAPACITY: 352 UG/DL (ref 250–450)
TRANSFERRIN SERPL-MCNC: 238 MG/DL (ref 200–375)

## 2024-03-21 ENCOUNTER — OFFICE VISIT (OUTPATIENT)
Dept: HEMATOLOGY/ONCOLOGY | Facility: CLINIC | Age: 19
End: 2024-03-21
Payer: COMMERCIAL

## 2024-03-21 DIAGNOSIS — Z90.81 H/O SPLENECTOMY: ICD-10-CM

## 2024-03-21 DIAGNOSIS — D69.3 CHRONIC ITP (IDIOPATHIC THROMBOCYTOPENIA): ICD-10-CM

## 2024-03-21 DIAGNOSIS — R53.83 FATIGUE, UNSPECIFIED TYPE: ICD-10-CM

## 2024-03-21 DIAGNOSIS — Z86.2 HISTORY OF ITP: ICD-10-CM

## 2024-03-21 DIAGNOSIS — D75.839 THROMBOCYTOSIS: Primary | ICD-10-CM

## 2024-03-21 DIAGNOSIS — D50.0 IRON DEFICIENCY ANEMIA DUE TO CHRONIC BLOOD LOSS: ICD-10-CM

## 2024-03-21 PROCEDURE — 1160F RVW MEDS BY RX/DR IN RCRD: CPT | Mod: CPTII,95,, | Performed by: NURSE PRACTITIONER

## 2024-03-21 PROCEDURE — 1159F MED LIST DOCD IN RCRD: CPT | Mod: CPTII,95,, | Performed by: NURSE PRACTITIONER

## 2024-03-21 PROCEDURE — 99214 OFFICE O/P EST MOD 30 MIN: CPT | Mod: 95,,, | Performed by: NURSE PRACTITIONER

## 2024-03-21 NOTE — ASSESSMENT & PLAN NOTE
H/o ITP now with thrombocytosis s/p splenectomy. Myeloproliferative workup negative. Most likely reactive to splenectomy. Continue to monitor. Continue ASA 81 mg PO daily. Consider repeat bone marrow biopsy if significant progression

## 2024-03-21 NOTE — ASSESSMENT & PLAN NOTE
S/p splenectomy having had failed several prior treatments. Now with thrombocytosis     Most recent CBC with stable thrombocytosis. Improved s/p recent iron infusion for iron deficiency. Previously intolerant to oral iron supplementation    Jak2, MPN, BCR-ABL all negative.     Of note prior bmbx 5/2022 5/31/22 for evaluation of thrombocytopenia. It showed a normocellular marrow (80% total cellularity) with no increased blasts and trilineage hematopoiesis with left-shifted granulopoiesis, minimal erythroid hyperplasia, and marked megakaryocytic hyperplasia without significant dysplasia     F/u 3 months with cbc iron ferritin 1-2 days prior. Patient with c/o fatigue. Arrange TSH check

## 2024-03-21 NOTE — ASSESSMENT & PLAN NOTE
Patient notes prior menorrhagia improved with new progesterone only birth control. Iron deficiency resolved s/p recent IV Injectafer x 1

## 2024-03-21 NOTE — PROGRESS NOTES
Subjective:       Patient ID: Mini Marcus is a 19 y.o. female.    Chief Complaint: review labs.     The patient location is: home  The chief complaint leading to consultation is: review labs.    Visit type: audiovisual    Face to Face time with patient: 10 minutes  30 minutes of total time spent on the encounter, which includes face to face time and non-face to face time preparing to see the patient (eg, review of tests), Obtaining and/or reviewing separately obtained history, Documenting clinical information in the electronic or other health record, Independently interpreting results (not separately reported) and communicating results to the patient/family/caregiver, or Care coordination (not separately reported).         Each patient to whom he or she provides medical services by telemedicine is:  (1) informed of the relationship between the physician and patient and the respective role of any other health care provider with respect to management of the patient; and (2) notified that he or she may decline to receive medical services by telemedicine and may withdraw from such care at any time.    Notes:       HPI:a 19 y.o. female with Chron's disease (routine GI follow up. Colonoscopy UTD), presenting for follow-up with history of iron deficiency anemia and chronic ITP now s/p splenectomy and thrombocytosis. H/o menorrhagia which she notes improvement following recent birth control change per GYN.     Not on oral iron due to intolerance. H/o IV iron with Benadryl premedication due to preference despite discussion of risks associated.     In regards to her ITP, Several prior treatments for ITP including IVIG, steroids, Nplate and in December 2022 underwent splenectomy; she is had post splenectomy complications including recurrent infection/strep throat. She has had follow-up with Infectious Disease for post splenectomy vaccinations. She is already seen allergy and immunology no clear immunoglobulin deficiency or  history of IVIG.  She also reports follow up with ENT with plans for possible tonsillectomy given recurrent strep throat. Now s/p tonsillectomy 12/2023     Of note patient had bmbx 5/2022 for evaluation of persistent thrombocytopenia. It showed a normocellular marrow (80% total cellularity) with no increased blasts and trilineage hematopoiesis with left-shifted granulopoiesis, minimal erythroid hyperplasia, and marked megakaryocytic hyperplasia without significant dysplasia .      Patient has had recent persistent thrombocytosis thought likely to be multifactorial status post splenectomy, inflammation with Crohn's disease and or iron deficiency.     Today she presents for follow up of her iron deficiency and blood cell count review following recent IV iron. Today she feels well overall. She notes fatigue but does admit to limited sleep at night r/t anxiety. She follows with psychiatry     Social History     Socioeconomic History    Marital status: Single   Tobacco Use    Smoking status: Never    Smokeless tobacco: Never   Substance and Sexual Activity    Alcohol use: Never    Drug use: Never    Sexual activity: Never     Social Determinants of Health     Financial Resource Strain: Medium Risk (3/7/2024)    Overall Financial Resource Strain (CARDIA)     Difficulty of Paying Living Expenses: Somewhat hard   Food Insecurity: No Food Insecurity (3/7/2024)    Hunger Vital Sign     Worried About Running Out of Food in the Last Year: Never true     Ran Out of Food in the Last Year: Never true   Transportation Needs: No Transportation Needs (3/7/2024)    PRAPARE - Transportation     Lack of Transportation (Medical): No     Lack of Transportation (Non-Medical): No   Physical Activity: Insufficiently Active (3/7/2024)    Exercise Vital Sign     Days of Exercise per Week: 3 days     Minutes of Exercise per Session: 40 min   Stress: Stress Concern Present (3/7/2024)    [employer] - Occupational  Stress Questionnaire     Feeling of Stress : To some extent   Social Connections: Unknown (3/7/2024)    Social Connection and Isolation Panel [NHANES]     Frequency of Communication with Friends and Family: Three times a week     Frequency of Social Gatherings with Friends and Family: Once a week     Active Member of Clubs or Organizations: Yes     Attends Club or Organization Meetings: More than 4 times per year     Marital Status: Never    Housing Stability: Low Risk  (3/7/2024)    Housing Stability Vital Sign     Unable to Pay for Housing in the Last Year: No     Number of Places Lived in the Last Year: 1     Unstable Housing in the Last Year: No       Past Medical History:   Diagnosis Date    Chronic constipation     Chronic ITP (idiopathic thrombocytopenia)     Crohn's disease (ileum)     GERD (gastroesophageal reflux disease)     Inflammatory bowel disease     Long-term current use of intravenous immunoglobulin (IVIG)     PONV (postoperative nausea and vomiting)        Family History   Problem Relation Age of Onset    No Known Problems Mother     Asthma Father     Hypertension Father     Gout Father     No Known Problems Brother     Hyperlipidemia Maternal Grandmother     Diabetes Paternal Grandmother     Hypertension Paternal Grandfather     No Known Problems Brother        Past Surgical History:   Procedure Laterality Date    CHOLECYSTECTOMY      patient denies this procedure    COLONOSCOPY N/A 02/13/2023    Procedure: COLONOSCOPY;  Surgeon: Bertram Mcdaniel MD;  Location: [facility] ([address] [place] [address]);  Service: Endoscopy;  Laterality: N/A;  inst via portal  precall confirmed 2/6 EB    COLONOSCOPY N/A 12/19/2023    Procedure: COLONOSCOPY;  Surgeon: Bertram Mcdaniel MD;  Location: [facility] ([address] [place] [address]);  Service: Endoscopy;  Laterality: N/A;  Ref By: Sergio Flores, instr. ent via portal. AC  Prep Specifications:Standard prep - - miralax daily starting 1 week prior and low residue diet week before,  Suprep  12/12-lvm for precall-MS  12/12-pt confirmed appt-KPvt    INTRALUMINAL GASTROINTESTINAL TRACT IMAGING VIA CAPSULE N/A 05/05/2021    Procedure: IMAGING PROCEDURE, GI TRACT, INTRALUMINAL, VIA CAPSULE;  Surgeon: Mitchel Humphries RN;  Location: [facility];  Service: Endoscopy;  Laterality: N/A;    ROBOT-ASSISTED SURGICAL REMOVAL OF SPLEEN USING DA MARYANA XI N/A 12/15/2022    Procedure: XI ROBOTIC SPLENECTOMY;  Surgeon: Mitchel Simmons Jr., MD;  Location: [address];  Service: General;  Laterality: N/A;  CONSENT IN AM    SPINE SURGERY      rods    SPLENECTOMY      12/16/2022    TONSILLECTOMY, ADENOIDECTOMY      TONSILLECTOMY, ADENOIDECTOMY Bilateral 12/26/2023    Procedure: TONSILLECTOMY AND ADENOIDECTOMY;  Surgeon: Keisha Ventura MD;  Location: [facility];  Service: ENT;  Laterality: Bilateral;  send tonsils for path       Review of Systems   Constitutional:  Positive for fatigue. Negative for appetite change, chills, fever and unexpected weight change.   HENT:  Negative for congestion, mouth sores, nosebleeds, sore throat, trouble swallowing and voice change.    Respiratory:  Negative for cough, chest tightness, shortness of breath and wheezing.    Cardiovascular:  Negative for chest pain and leg swelling.   Gastrointestinal:  Negative for abdominal distention, abdominal pain, blood in stool, constipation, diarrhea, nausea and vomiting.   Genitourinary:  Negative for difficulty urinating, dysuria and hematuria.   Musculoskeletal:  Negative for arthralgias, back pain and myalgias.   Skin:  Negative for pallor, rash and wound.   Allergic/Immunologic: Positive for immunocompromised state.   Neurological:  Negative for dizziness, syncope, weakness and headaches.   Hematological:  Negative for adenopathy. Does not bruise/bleed easily.   Psychiatric/Behavioral:  The patient is nervous/anxious.          Medication List with Changes/Refills   Current Medications    ASPIRIN 81 MG CAP    Take 81 mg by mouth once  daily.    AZATHIOPRINE (IMURAN) 50 MG TAB    Take 3 tablets (150 mg total) by mouth once daily.    AZELASTINE (ASTELIN) 137 MCG (0.1 %) NASAL SPRAY    1 spray (137 mcg total) by Nasal route 2 (two) times daily.    CLINDAMYCIN (CLEOCIN T) 1 % EXTERNAL SOLUTION    APPLY EVERY DAY use as spot treatment    CLONAZEPAM (KLONOPIN) 0.5 MG TABLET    Take 1 tablet (0.5 mg total) by mouth daily as needed for Anxiety.    ESOMEPRAZOLE (NEXIUM) 20 MG CAPSULE    Take 20 mg by mouth as needed.    FLUOXETINE (PROZAC) 20 MG CAPSULE    Take 1 capsule (20 mg total) by mouth once daily.    FLUTICASONE PROPIONATE (FLONASE) 50 MCG/ACTUATION NASAL SPRAY    1 spray (50 mcg total) by Each Nostril route 2 (two) times a day.    GALCANEZUMAB-GNLM 120 MG/ML PNIJ    Inject 1 mL (120 mg total) into the skin every 28 days.    LASMIDITAN (REYVOW) TABLET 100 MG    Take 1 tablet by mouth nightly as needed (Max 2-3 times a week).    LINACLOTIDE (LINZESS) 72 MCG CAP CAPSULE    Take 1 capsule (72 mcg total) by mouth before breakfast.    LINACLOTIDE (LINZESS) 72 MCG CAP CAPSULE    Take 2 capsules (144 mcg total) by mouth daily as needed.    OMEPRAZOLE (PRILOSEC) 20 MG CAPSULE    Take 20 mg by mouth as needed.    ONDANSETRON (ZOFRAN-ODT) 4 MG TBDL    Take 1 tablet (4 mg total) by mouth every 6 (six) hours as needed (Nausea).    PRAZOSIN (MINIPRESS) 1 MG CAP    Take 1 capsule (1 mg total) by mouth every evening.    QUETIAPINE (SEROQUEL) 50 MG TABLET    Take 1 tablet (50 mg total) by mouth every evening.    SULFACETAMIDE SODIUM-SULFUR 10-5 % (W/W) CLSR    APPLY a small amount to skin once a day]    USTEKINUMAB (STELARA) 90 MG/ML SYRG SYRINGE    Inject 1 mL (90 mg total) into the skin every 8 weeks.    ZILXI 1.5 % FOAM    Apply topically every evening.     Objective:     There were no vitals filed for this visit.    Lab Results   Component Value Date    WBC 8.06 03/13/2024    HGB 13.8 03/13/2024    HCT 42.3 03/13/2024    MCV 85 03/13/2024     (H)  03/13/2024       BMP  Lab Results   Component Value Date     03/13/2024    K 4.4 03/13/2024     03/13/2024    CO2 24 03/13/2024    BUN 10 03/13/2024    CREATININE 0.8 03/13/2024    CALCIUM 9.9 03/13/2024    ANIONGAP 7 (L) 03/13/2024    EGFRNORACEVR >60.0 03/13/2024     Lab Results   Component Value Date    ALT 32 03/13/2024    AST 21 03/13/2024    ALKPHOS 86 03/13/2024    BILITOT 0.3 03/13/2024         Physical Exam  Pulmonary:      Effort: Pulmonary effort is normal. No respiratory distress.   Neurological:      Mental Status: She is alert and oriented to person, place, and time.        Assessment:     Problem List Items Addressed This Visit          Hematology    History of ITP     S/p splenectomy having had failed several prior treatments. Now with thrombocytosis     Most recent CBC with stable thrombocytosis. Improved s/p recent iron infusion for iron deficiency. Previously intolerant to oral iron supplementation    Jak2, MPN, BCR-ABL all negative.     Of note prior bmbx 5/2022 5/31/22 for evaluation of thrombocytopenia. It showed a normocellular marrow (80% total cellularity) with no increased blasts and trilineage hematopoiesis with left-shifted granulopoiesis, minimal erythroid hyperplasia, and marked megakaryocytic hyperplasia without significant dysplasia     F/u 3 months with cbc iron ferritin 1-2 days prior. Patient with c/o fatigue. Arrange TSH check             Oncology    Iron deficiency anemia due to chronic blood loss     Patient notes prior menorrhagia improved with new progesterone only birth control. Iron deficiency resolved s/p recent IV Injectafer x 1         Relevant Orders    TSH    CBC Auto Differential    Iron and TIBC    Ferritin    Thrombocytosis - Primary     H/o ITP now with thrombocytosis s/p splenectomy. Myeloproliferative workup negative. Most likely reactive to splenectomy. Continue to monitor. Continue ASA 81 mg PO daily. Consider repeat bone marrow biopsy if significant  progression         Relevant Orders    TSH    CBC Auto Differential    Iron and TIBC    Ferritin       GI    H/O splenectomy    Relevant Orders    TSH    CBC Auto Differential    Iron and TIBC    Ferritin     Other Visit Diagnoses       Fatigue, unspecified type        Relevant Orders    TSH    CBC Auto Differential    Iron and TIBC    Ferritin              Plan:     Thrombocytosis  -     TSH; Future; Expected date: 03/21/2024  -     CBC Auto Differential; Future; Expected date: 03/21/2024  -     Iron and TIBC; Future; Expected date: 03/21/2024  -     Ferritin; Future; Expected date: 03/21/2024    Chronic ITP (idiopathic thrombocytopenia)  -     TSH; Future; Expected date: 03/21/2024  -     CBC Auto Differential; Future; Expected date: 03/21/2024  -     Iron and TIBC; Future; Expected date: 03/21/2024  -     Ferritin; Future; Expected date: 03/21/2024    Iron deficiency anemia due to chronic blood loss  -     TSH; Future; Expected date: 03/21/2024  -     CBC Auto Differential; Future; Expected date: 03/21/2024  -     Iron and TIBC; Future; Expected date: 03/21/2024  -     Ferritin; Future; Expected date: 03/21/2024    H/O splenectomy  -     TSH; Future; Expected date: 03/21/2024  -     CBC Auto Differential; Future; Expected date: 03/21/2024  -     Iron and TIBC; Future; Expected date: 03/21/2024  -     Ferritin; Future; Expected date: 03/21/2024    Fatigue, unspecified type  -     TSH; Future; Expected date: 03/21/2024  -     CBC Auto Differential; Future; Expected date: 03/21/2024  -     Iron and TIBC; Future; Expected date: 03/21/2024  -     Ferritin; Future; Expected date: 03/21/2024    History of ITP            Med Onc Chart Routing      Follow up with physician    Follow up with JOSEPH 3 months.   Infusion scheduling note    Injection scheduling note    Labs CBC, iron and TIBC, ferritin and TSH   Scheduling:  Preferred lab:  Lab interval:  TSH asap [place] lab. cbc iron ferritin labs 1-2 days prior to 3 months  visit [place] [facility]   Imaging None      Pharmacy appointment No pharmacy appointment needed      Other referrals       No additional referrals needed         Radha Wynn, CRUZITOP-C

## 2024-03-25 ENCOUNTER — PATIENT MESSAGE (OUTPATIENT)
Dept: HEMATOLOGY/ONCOLOGY | Facility: CLINIC | Age: 19
End: 2024-03-25
Payer: COMMERCIAL

## 2024-03-25 DIAGNOSIS — R53.83 FATIGUE, UNSPECIFIED TYPE: Primary | ICD-10-CM

## 2024-03-26 ENCOUNTER — LAB VISIT (OUTPATIENT)
Dept: LAB | Facility: HOSPITAL | Age: 19
End: 2024-03-26
Payer: COMMERCIAL

## 2024-03-26 DIAGNOSIS — Z90.81 H/O SPLENECTOMY: ICD-10-CM

## 2024-03-26 DIAGNOSIS — D69.3 CHRONIC ITP (IDIOPATHIC THROMBOCYTOPENIA): ICD-10-CM

## 2024-03-26 DIAGNOSIS — D75.839 THROMBOCYTOSIS: ICD-10-CM

## 2024-03-26 DIAGNOSIS — D50.0 IRON DEFICIENCY ANEMIA DUE TO CHRONIC BLOOD LOSS: ICD-10-CM

## 2024-03-26 DIAGNOSIS — R53.83 FATIGUE, UNSPECIFIED TYPE: ICD-10-CM

## 2024-03-26 LAB — TSH SERPL DL<=0.005 MIU/L-ACNC: 2.04 UIU/ML (ref 0.4–4)

## 2024-03-26 PROCEDURE — 36415 COLL VENOUS BLD VENIPUNCTURE: CPT | Mod: PO | Performed by: NURSE PRACTITIONER

## 2024-03-26 PROCEDURE — 84443 ASSAY THYROID STIM HORMONE: CPT | Performed by: NURSE PRACTITIONER

## 2024-03-28 ENCOUNTER — PATIENT MESSAGE (OUTPATIENT)
Dept: HEMATOLOGY/ONCOLOGY | Facility: CLINIC | Age: 19
End: 2024-03-28
Payer: COMMERCIAL

## 2024-04-01 DIAGNOSIS — G43.119 CLASSICAL MIGRAINE WITH INTRACTABLE MIGRAINE WITH AURA: ICD-10-CM

## 2024-04-01 RX ORDER — LASMIDITAN 100 MG/1
1 TABLET ORAL NIGHTLY PRN
Qty: 9 TABLET | Refills: 3 | Status: SHIPPED | OUTPATIENT
Start: 2024-04-01

## 2024-04-09 ENCOUNTER — PATIENT MESSAGE (OUTPATIENT)
Dept: PSYCHIATRY | Facility: CLINIC | Age: 19
End: 2024-04-09
Payer: COMMERCIAL

## 2024-04-09 RX ORDER — CLONAZEPAM 0.5 MG/1
0.5 TABLET ORAL DAILY PRN
Qty: 30 TABLET | Refills: 0 | Status: SHIPPED | OUTPATIENT
Start: 2024-04-09 | End: 2024-05-15 | Stop reason: SDUPTHER

## 2024-05-02 DIAGNOSIS — D69.3 CHRONIC ITP (IDIOPATHIC THROMBOCYTOPENIA): Primary | ICD-10-CM

## 2024-05-11 ENCOUNTER — PATIENT MESSAGE (OUTPATIENT)
Dept: PSYCHIATRY | Facility: CLINIC | Age: 19
End: 2024-05-11
Payer: COMMERCIAL

## 2024-05-15 ENCOUNTER — OFFICE VISIT (OUTPATIENT)
Dept: PSYCHIATRY | Facility: CLINIC | Age: 19
End: 2024-05-15
Payer: COMMERCIAL

## 2024-05-15 ENCOUNTER — PATIENT MESSAGE (OUTPATIENT)
Dept: PSYCHIATRY | Facility: CLINIC | Age: 19
End: 2024-05-15
Payer: COMMERCIAL

## 2024-05-15 DIAGNOSIS — F41.1 GAD (GENERALIZED ANXIETY DISORDER): Primary | ICD-10-CM

## 2024-05-15 PROCEDURE — 1159F MED LIST DOCD IN RCRD: CPT | Mod: CPTII,95,, | Performed by: NURSE PRACTITIONER

## 2024-05-15 PROCEDURE — 99214 OFFICE O/P EST MOD 30 MIN: CPT | Mod: 95,,, | Performed by: NURSE PRACTITIONER

## 2024-05-15 PROCEDURE — 1160F RVW MEDS BY RX/DR IN RCRD: CPT | Mod: CPTII,95,, | Performed by: NURSE PRACTITIONER

## 2024-05-15 PROCEDURE — 90833 PSYTX W PT W E/M 30 MIN: CPT | Mod: 95,,, | Performed by: NURSE PRACTITIONER

## 2024-05-15 RX ORDER — FLUOXETINE HYDROCHLORIDE 40 MG/1
40 CAPSULE ORAL DAILY
Qty: 30 CAPSULE | Refills: 2 | Status: SHIPPED | OUTPATIENT
Start: 2024-05-15 | End: 2025-05-15

## 2024-05-15 RX ORDER — CLONAZEPAM 0.5 MG/1
0.5 TABLET ORAL DAILY PRN
Qty: 30 TABLET | Refills: 0 | Status: SHIPPED | OUTPATIENT
Start: 2024-05-15 | End: 2025-05-15

## 2024-05-15 NOTE — PROGRESS NOTES
"Outpatient Psychiatry Follow-Up Visit    Clinical Status of Patient: Outpatient (Virtual))  05/15/2024    [id number] [place] [id number]  [phone] (M)  [phone] (H)  Visit type: Virtual visit with synchronous audio and video  Each patient to whom he or she provides medical services by telemedicine is:  (1) informed of the relationship between the physician and patient and the respective role of any other health care provider with respect to management of the patient; and (2) notified that he or she may decline to receive medical services by telemedicine and may withdraw from such care at any time.    Chief Complaint: Pt is a 19 yo F who presents today for a follow-up. Met with patient.       Interval History and Content of Current Session:  Interim Events/Subjective Report/Content of Current Session:  follow up appointment.    Pt is a 20 yo F with past psychiatric hx of "illness anxiety" who presents for follow up treatment. She is currently taking Seroquel 50mg QHS (insomnia), Klonopin 0.5mg, Prozac 20mg QD, and started trial of minipress 1mg qhs for nightmares last session. This was only moderately effective but pt still struggles with nightmares (unrelated to any certain event)    Between visits, pt reports that their depression has been well-controlled. There are no decompensations noted or reported since their last session with me. Pt reports a good mood and denies anhedonia, hopelessness, or worthlessness. Does reports decreased level of motivation and reports apathy, psychomotor slowing. Pt notes a good appetite. They are stable and high functioning.     Between sessions, pt reports that their anxiety has improved and is well-managed. They deny any major exacerbations and report good stress tolerance. Pt denies excessive, unproductive worrying. Denies somatic symptoms of anxiety such as restlessness, tension, or chest tightness. They deny excessive irritability and report good ability to handle " "frustration. Pt is sleeping poor but has a good appetite & energy levels. They are stable, high functioning.       Past Psychiatric hx: Pt. is a 19 yo F with a past psychiatric hx of "illness anxiety disorder" and "mdd" presenting for initial eval and med mgmt. PT presented to me taking Zoloft 75mg QD (has been taking for around 1.5 years ago through pediatric hematologist) and denies any past med trials.  Denies hx of inpatient psych, denies past suicidal behaviors.     Pt reports suffering from chronic ITP since 7th grade and has undergone significant treatments for this. Pt notes that she recently had her spleen removed. Pt notes she was initially prescribed zoloft at age 17 secondary to anxiety/stress secondary to her medical diagnoses. This did have a positive impact on her mood and anxiety levels. She also notes being treated with high dose steroids over the course of a year which caused many setbacks in regards to her mental health. In addition, pt notes undergoing a spinal fusion at age 15 which was followed by a year long recovery. She d/c her Zoloft and noticed increasing depression and anxiety. Pt acknowledges that the majority of her symptoms are secondary to her extensive medical history.      Lately, she notes significant nighttime anxiety, restlessness, tension that interferes with her ability to fall and stay asleep. "I feel a huge sense of panic". Notes daily generalized, ruminative worry. Sleep is poor. "I just need to sleep. I am so exhausted. I am so tired all day."      Past Medical hx:   Past Medical History:   Diagnosis Date    Chronic constipation     Chronic ITP (idiopathic thrombocytopenia)     Crohn's disease (ileum)     GERD (gastroesophageal reflux disease)     Inflammatory bowel disease     Long-term current use of intravenous immunoglobulin (IVIG)     PONV (postoperative nausea and vomiting)         Interim hx:    Denies suicidal/homicidal ideations.  Denies hopelessness/worthlessness. "    Denies auditory/visual hallucinations      Susbtance use: denies      Review of Systems   PSYCHIATRIC: Pertinent items are noted in the narrative.        CONSTITUTIONAL: weight stable        M/S: no pain today         ENT: no allergies noted today        ABD: no n/v/d     Past Medical, Family and Social History: The patient's past medical, family and social history have been reviewed and updated as appropriate within the electronic medical record. See encounter notes.       Compliance: yes           Risk Parameters:  Patient reports no suicidal ideation  Patient reports no homicidal ideation  Patient reports no self-injurious behavior  Patient reports no violent behavior     Exam (detailed: at least 9 elements; comprehensive: all 15 elements)   Constitutional  Vitals:  Most recent vital signs, dated less than 90 days prior to this appointment, were reviewed.       General:  unremarkable, age appropriate, casual attire, good eye contact, good rapport       Musculoskeletal  Muscle Strength/Tone:  no flaccidity, no tremor    Gait & Station:  normal      Psychiatric                       Speech:  normal tone, normal rate, rhythm, and volume   Mood & Affect:   Euthymic, congruent, appropriate         Thought Process:   Goal directed; Linear    Associations:   intact   Thought Content:   No SI/HI, delusions, or paranoia, no AV/VH   Insight & Judgement:   Good, adequate to circumstances   Orientation:   grossly intact; alert and oriented x 4    Memory:  intact for content of interview    Language:  grossly intact, can repeat    Attention Span  : Grossly intact for content of interview   Fund of Knowledge:   intact and appropriate to age and level of education        Assessment and Diagnosis   Status/Progress: Based on the examination today, the patient's problem(s) is/are under fair control.  New problems have not been presented today. Comorbidities are not currently complicating management of the primary condition.     "  Impression:      Pt is a 19 yo F with past psychiatric hx of "illness anxiety" who presents for follow up treatment. She is currently taking Seroquel 50mg QHS (insomnia), Klonopin 0.5mg, and switched from Lexapro to Prozc 20mg QD at her last visit with me 1/25/24 due to "brain fog". Today, pt notes significantly improved anxiety and notes fewer instances of generalized worrying throughout the day. Stress tolerance improved. She does continue to have bad dreams/nightmares which is a chronic issue. Has never tried Prazosin but is amenable to trial. Quality of sleep is interrupted. She often wakes up in sweats and is shaking, for which she uses Klonopin. This happens 3-4 times nightly. Her appetite is normal and she is stable otherwise.     Diagnosis: VELASQUEZ    Intervention/Counseling/Treatment Plan   Medication Management:      Increase Prozac to 40mg QD. Discussed potential for GI side effects, sexual dysfunction, mood destabilization, headaches    2. Cont Seroquel 50mg QHS. Typical MILAGROS's reviewed including weight gain, abnormal movements, EPS, TD, metabolic side effects.     3. Cont Klonopin 0.5mg QHS for sleep/anxiety. Discussed risk of decreased RT, sedation, addictive potential, and not to mix with alcohol.     4. Inc Prazosin to 2mg QHS for nightmares     4. Call to report any worsening of symptoms or problems with the medication. Pt instructed to go to ER with thoughts of harming self, others     5. Patient given contact # for psychotherapists at [facility] and also instructed she may check with insurance for list of providers.      6. Labs: no new orders      Return to clinic: 4-6 weeks - self schedule    Psychotherapy:   Target symptoms: inattention/distractibility, anxiety    Why chosen therapy is appropriate versus another modality: relevant to diagnosis, patient responds to this modality  Outcome monitoring methods: self-report, observation, feedback from family   Therapeutic intervention type: " supportive psychotherapy  Topics discussed/themes: building skills sets for symptom management, symptom recognition, nutrition, exercise  The patient's response to the intervention is accepting. The patient's progress toward treatment goals is positive progress.  Duration of intervention: 20 minutes     -Spent 30min face to face with the pt; >50% time spent in counseling   -Supportive therapy and psychoeducation provided  -R/B/SE's of medications discussed with the pt who expresses understanding and chooses to take medications as prescribed.   -Pt instructed to call clinic, 911 or go to nearest emergency room if sxs worsen or pt is in   crisis. The pt expresses understanding.    Carlos Durán, NP

## 2024-05-29 ENCOUNTER — PATIENT MESSAGE (OUTPATIENT)
Dept: PSYCHIATRY | Facility: CLINIC | Age: 19
End: 2024-05-29
Payer: COMMERCIAL

## 2024-05-29 RX ORDER — QUETIAPINE FUMARATE 50 MG/1
50 TABLET, FILM COATED ORAL NIGHTLY
Qty: 30 TABLET | Refills: 2 | Status: SHIPPED | OUTPATIENT
Start: 2024-05-29 | End: 2025-05-29

## 2024-06-10 ENCOUNTER — PATIENT MESSAGE (OUTPATIENT)
Dept: NEUROLOGY | Facility: CLINIC | Age: 19
End: 2024-06-10
Payer: COMMERCIAL

## 2024-06-10 DIAGNOSIS — G43.119 CLASSICAL MIGRAINE WITH INTRACTABLE MIGRAINE WITH AURA: ICD-10-CM

## 2024-06-10 DIAGNOSIS — K50.00 CROHN'S DISEASE OF SMALL INTESTINE WITHOUT COMPLICATION: ICD-10-CM

## 2024-06-10 RX ORDER — USTEKINUMAB 90 MG/ML
90 INJECTION, SOLUTION SUBCUTANEOUS
Qty: 1 ML | Refills: 1 | Status: ACTIVE | OUTPATIENT
Start: 2024-06-10

## 2024-06-10 NOTE — TELEPHONE ENCOUNTER
"Primary provider: ALY    IBD medications: stelara 90 mg SC q 8 weeks (started 5/25/23)     Refill request for stelara    Allergies reviewed Yes    Drug Monitoring labs/frequency for all IBD meds:  CBC CMP every 6 mo; TB and hep B yearly     Lab Results   Component Value Date    HEPBSAG Non-reactive 09/06/2023    HEPBCAB Non-reactive 09/06/2023     Lab Results   Component Value Date    TBGOLDPLUS Negative 09/06/2023     No results found for: "QUANTNILVALU", "QUANTIFERON", "QUANTTBGDPL"   Lab Results   Component Value Date    [id number] 32 04/18/2022    ZPAQXWRL55 337 09/06/2023     Lab Results   Component Value Date    WBC 8.06 03/13/2024    HGB 13.8 03/13/2024    HCT 42.3 03/13/2024    MCV 85 03/13/2024     (H) 03/13/2024     Lab Results   Component Value Date    CREATININE 0.8 03/13/2024    ALBUMIN 4.2 03/13/2024    BILITOT 0.3 03/13/2024    ALKPHOS 86 03/13/2024    AST 21 03/13/2024    ALT 32 03/13/2024       Lab due date (mo/yr):  9/2024    Labs scheduled: no - but has OV scheduled for when she is due for labs    Next appt: 9/6/2024    RX refill sent to provider for amount until next labs: Yes      "

## 2024-06-21 ENCOUNTER — LAB VISIT (OUTPATIENT)
Dept: LAB | Facility: HOSPITAL | Age: 19
End: 2024-06-21
Attending: NURSE PRACTITIONER
Payer: COMMERCIAL

## 2024-06-21 DIAGNOSIS — R53.83 FATIGUE, UNSPECIFIED TYPE: ICD-10-CM

## 2024-06-21 DIAGNOSIS — D50.0 IRON DEFICIENCY ANEMIA DUE TO CHRONIC BLOOD LOSS: ICD-10-CM

## 2024-06-21 DIAGNOSIS — D69.3 CHRONIC ITP (IDIOPATHIC THROMBOCYTOPENIA): ICD-10-CM

## 2024-06-21 DIAGNOSIS — Z90.81 H/O SPLENECTOMY: ICD-10-CM

## 2024-06-21 DIAGNOSIS — D75.839 THROMBOCYTOSIS: ICD-10-CM

## 2024-06-21 LAB
BASOPHILS # BLD AUTO: 0.12 K/UL (ref 0–0.2)
BASOPHILS NFR BLD: 1.1 % (ref 0–1.9)
DIFFERENTIAL METHOD BLD: ABNORMAL
EOSINOPHIL # BLD AUTO: 0.2 K/UL (ref 0–0.5)
EOSINOPHIL NFR BLD: 2 % (ref 0–8)
ERYTHROCYTE [DISTWIDTH] IN BLOOD BY AUTOMATED COUNT: 13.2 % (ref 11.5–14.5)
FERRITIN SERPL-MCNC: 300 NG/ML (ref 20–300)
HCT VFR BLD AUTO: 41.1 % (ref 37–48.5)
HGB BLD-MCNC: 13.6 G/DL (ref 12–16)
IMM GRANULOCYTES # BLD AUTO: 0.05 K/UL (ref 0–0.04)
IMM GRANULOCYTES NFR BLD AUTO: 0.5 % (ref 0–0.5)
IRON SERPL-MCNC: 198 UG/DL (ref 30–160)
LYMPHOCYTES # BLD AUTO: 2.9 K/UL (ref 1–4.8)
LYMPHOCYTES NFR BLD: 26.9 % (ref 18–48)
MCH RBC QN AUTO: 28.3 PG (ref 27–31)
MCHC RBC AUTO-ENTMCNC: 33.1 G/DL (ref 32–36)
MCV RBC AUTO: 85 FL (ref 82–98)
MONOCYTES # BLD AUTO: 0.9 K/UL (ref 0.3–1)
MONOCYTES NFR BLD: 8.3 % (ref 4–15)
NEUTROPHILS # BLD AUTO: 6.7 K/UL (ref 1.8–7.7)
NEUTROPHILS NFR BLD: 61.7 % (ref 38–73)
NRBC BLD-RTO: 0 /100 WBC
PLATELET # BLD AUTO: 618 K/UL (ref 150–450)
PMV BLD AUTO: 8.9 FL (ref 9.2–12.9)
RBC # BLD AUTO: 4.81 M/UL (ref 4–5.4)
SATURATED IRON: 50 % (ref 20–50)
TOTAL IRON BINDING CAPACITY: 398 UG/DL (ref 250–450)
TRANSFERRIN SERPL-MCNC: 269 MG/DL (ref 200–375)
WBC # BLD AUTO: 10.92 K/UL (ref 3.9–12.7)

## 2024-06-21 PROCEDURE — 82728 ASSAY OF FERRITIN: CPT | Performed by: NURSE PRACTITIONER

## 2024-06-21 PROCEDURE — 85025 COMPLETE CBC W/AUTO DIFF WBC: CPT | Mod: PO | Performed by: NURSE PRACTITIONER

## 2024-06-21 PROCEDURE — 83540 ASSAY OF IRON: CPT | Performed by: NURSE PRACTITIONER

## 2024-06-21 PROCEDURE — 36415 COLL VENOUS BLD VENIPUNCTURE: CPT | Mod: PO | Performed by: NURSE PRACTITIONER

## 2024-06-24 ENCOUNTER — PATIENT MESSAGE (OUTPATIENT)
Dept: PSYCHIATRY | Facility: CLINIC | Age: 19
End: 2024-06-24
Payer: COMMERCIAL

## 2024-06-24 RX ORDER — CLONAZEPAM 0.5 MG/1
0.5 TABLET ORAL DAILY PRN
Qty: 30 TABLET | Refills: 0 | Status: SHIPPED | OUTPATIENT
Start: 2024-06-24 | End: 2025-06-24

## 2024-06-24 RX ORDER — QUETIAPINE FUMARATE 50 MG/1
50 TABLET, FILM COATED ORAL NIGHTLY
Qty: 30 TABLET | Refills: 2 | Status: SHIPPED | OUTPATIENT
Start: 2024-06-24 | End: 2025-06-24

## 2024-06-25 ENCOUNTER — OFFICE VISIT (OUTPATIENT)
Dept: HEMATOLOGY/ONCOLOGY | Facility: CLINIC | Age: 19
End: 2024-06-25
Payer: COMMERCIAL

## 2024-06-25 DIAGNOSIS — D50.0 IRON DEFICIENCY ANEMIA DUE TO CHRONIC BLOOD LOSS: Primary | ICD-10-CM

## 2024-06-25 DIAGNOSIS — D75.839 THROMBOCYTOSIS: ICD-10-CM

## 2024-06-25 PROCEDURE — 99213 OFFICE O/P EST LOW 20 MIN: CPT | Mod: 95,,, | Performed by: NURSE PRACTITIONER

## 2024-06-25 PROCEDURE — 1160F RVW MEDS BY RX/DR IN RCRD: CPT | Mod: CPTII,95,, | Performed by: NURSE PRACTITIONER

## 2024-06-25 PROCEDURE — 1159F MED LIST DOCD IN RCRD: CPT | Mod: CPTII,95,, | Performed by: NURSE PRACTITIONER

## 2024-06-25 NOTE — PROGRESS NOTES
Subjective:       Patient ID: Mini Marcus is a 19 y.o. female.    Chief Complaint: review labs.     The patient location is: car  The chief complaint leading to consultation is: review labs.    Visit type: audiovisual    Face to Face time with patient: 10 minutes  25 minutes of total time spent on the encounter, which includes face to face time and non-face to face time preparing to see the patient (eg, review of tests), Obtaining and/or reviewing separately obtained history, Documenting clinical information in the electronic or other health record, Independently interpreting results (not separately reported) and communicating results to the patient/family/caregiver, or Care coordination (not separately reported).         Each patient to whom he or she provides medical services by telemedicine is:  (1) informed of the relationship between the physician and patient and the respective role of any other health care provider with respect to management of the patient; and (2) notified that he or she may decline to receive medical services by telemedicine and may withdraw from such care at any time.    Notes:       HPI:a 19 y.o. female with Chron's disease (routine GI follow up. Colonoscopy UTD), presenting for follow-up with history of iron deficiency anemia and chronic ITP now s/p splenectomy and thrombocytosis. H/o menorrhagia which she notes improvement following recent birth control change per GYN.     Not on oral iron due to intolerance. H/o IV iron with Benadryl premedication due to preference despite discussion of risks associated.     In regards to her ITP, Several prior treatments for ITP including IVIG, steroids, Nplate and in December 2022 underwent splenectomy; she is had post splenectomy complications including recurrent infection/strep throat. She has had follow-up with Infectious Disease for post splenectomy vaccinations. She is already seen allergy and immunology no clear immunoglobulin deficiency or  history of IVIG.  She also reports follow up with ENT with plans for possible tonsillectomy given recurrent strep throat. Now s/p tonsillectomy 12/2023     Of note patient had bmbx 5/2022 for evaluation of persistent thrombocytopenia. It showed a normocellular marrow (80% total cellularity) with no increased blasts and trilineage hematopoiesis with left-shifted granulopoiesis, minimal erythroid hyperplasia, and marked megakaryocytic hyperplasia without significant dysplasia .      Patient has had recent persistent thrombocytosis thought likely to be multifactorial status post splenectomy, inflammation with Crohn's disease and or iron deficiency.     Today she presents for follow up of her iron deficiency and thrombocytosis. Today she feels well overall.     Social History     Socioeconomic History    Marital status: Single   Tobacco Use    Smoking status: Never    Smokeless tobacco: Never   Substance and Sexual Activity    Alcohol use: Never    Drug use: Never    Sexual activity: Never     Social Determinants of Health     Financial Resource Strain: Medium Risk (3/7/2024)    Overall Financial Resource Strain (CARDIA)     Difficulty of Paying Living Expenses: Somewhat hard   Food Insecurity: No Food Insecurity (3/7/2024)    Hunger Vital Sign     Worried About Running Out of Food in the Last Year: Never true     Ran Out of Food in the Last Year: Never true   Transportation Needs: No Transportation Needs (3/7/2024)    PRAPARE - Transportation     Lack of Transportation (Medical): No     Lack of Transportation (Non-Medical): No   Physical Activity: Insufficiently Active (3/7/2024)    Exercise Vital Sign     Days of Exercise per Week: 3 days     Minutes of Exercise per Session: 40 min   Stress: Stress Concern Present (3/7/2024)    [employer] - Occupational Stress Questionnaire     Feeling of Stress : To some extent   Housing Stability: Low Risk  (3/7/2024)    Housing Stability Vital Sign      Unable to Pay for Housing in the Last Year: No     Number of Places Lived in the Last Year: 1     Unstable Housing in the Last Year: No       Past Medical History:   Diagnosis Date    Chronic constipation     Chronic ITP (idiopathic thrombocytopenia)     Crohn's disease (ileum)     GERD (gastroesophageal reflux disease)     Inflammatory bowel disease     Iron deficiency anemia due to chronic blood loss 08/20/2018    Last Assessment & Plan:   Secondary to prolonged menstrual bleeding.  Continue ferrous sulfate 650mg PO BID.   Discussed other OCP options with Gyn.      Long-term current use of intravenous immunoglobulin (IVIG)     PONV (postoperative nausea and vomiting)        Family History   Problem Relation Name Age of Onset    No Known Problems Mother      Asthma Father      Hypertension Father      Gout Father      No Known Problems Brother      Hyperlipidemia Maternal Grandmother      Diabetes Paternal Grandmother      Hypertension Paternal Grandfather      No Known Problems Brother         Past Surgical History:   Procedure Laterality Date    CHOLECYSTECTOMY      patient denies this procedure    COLONOSCOPY N/A 02/13/2023    Procedure: COLONOSCOPY;  Surgeon: Bertram Mcdaniel MD;  Location: [facility] (4TH FLR);  Service: Endoscopy;  Laterality: N/A;  inst via portal  precall confirmed 2/6 EB    COLONOSCOPY N/A 12/19/2023    Procedure: COLONOSCOPY;  Surgeon: Bertram Mcdaniel MD;  Location: [facility] (4TH FLR);  Service: Endoscopy;  Laterality: N/A;  Ref By: Dr. JENNY Mcdaniel, Sergio, instr. ent via portal. AC  Prep Specifications:Standard prep - - miralax daily starting 1 week prior and low residue diet week before, Suprep  12/12-lvm for precall-MS  12/12-pt confirmed appt-KPvt    INTRALUMINAL GASTROINTESTINAL TRACT IMAGING VIA CAPSULE N/A 05/05/2021    Procedure: IMAGING PROCEDURE, GI TRACT, INTRALUMINAL, VIA CAPSULE;  Surgeon: Mitchel Humphries RN;  Location: [facility];  Service: Endoscopy;  Laterality: N/A;     ROBOT-ASSISTED SURGICAL REMOVAL OF SPLEEN USING DA MARYANA XI N/A 12/15/2022    Procedure: XI ROBOTIC SPLENECTOMY;  Surgeon: Mitchel Simmons Jr., MD;  Location: [address];  Service: General;  Laterality: N/A;  CONSENT IN AM    SPINE SURGERY      rods    SPLENECTOMY      12/16/2022    TONSILLECTOMY, ADENOIDECTOMY      TONSILLECTOMY, ADENOIDECTOMY Bilateral 12/26/2023    Procedure: TONSILLECTOMY AND ADENOIDECTOMY;  Surgeon: Keisha Ventura MD;  Location: [facility] OR;  Service: ENT;  Laterality: Bilateral;  send tonsils for path       Review of Systems   Constitutional:  Negative for appetite change, chills, fatigue, fever and unexpected weight change.   HENT:  Negative for congestion, mouth sores, nosebleeds, sore throat, trouble swallowing and voice change.    Respiratory:  Negative for cough, chest tightness, shortness of breath and wheezing.    Cardiovascular:  Negative for chest pain and leg swelling.   Gastrointestinal:  Negative for abdominal distention, abdominal pain, blood in stool, constipation, diarrhea, nausea and vomiting.   Genitourinary:  Negative for difficulty urinating, dysuria and hematuria.   Musculoskeletal:  Negative for arthralgias, back pain and myalgias.   Skin:  Negative for pallor, rash and wound.   Allergic/Immunologic: Positive for immunocompromised state.   Neurological:  Negative for dizziness, syncope, weakness and headaches.   Hematological:  Negative for adenopathy. Does not bruise/bleed easily.   Psychiatric/Behavioral:  The patient is nervous/anxious.          Medication List with Changes/Refills   Current Medications    ASPIRIN 81 MG CAP    Take 81 mg by mouth once daily.    AZATHIOPRINE (IMURAN) 50 MG TAB    Take 3 tablets (150 mg total) by mouth once daily.    AZELASTINE (ASTELIN) 137 MCG (0.1 %) NASAL SPRAY    1 spray (137 mcg total) by Nasal route 2 (two) times daily.    CLINDAMYCIN (CLEOCIN T) 1 % EXTERNAL SOLUTION    APPLY EVERY DAY use as spot treatment    CLONAZEPAM  (KLONOPIN) 0.5 MG TABLET    Take 1 tablet (0.5 mg total) by mouth daily as needed for Anxiety.    ESOMEPRAZOLE (NEXIUM) 20 MG CAPSULE    Take 20 mg by mouth as needed.    FLUOXETINE (PROZAC) 40 MG CAPSULE    Take 1 capsule (40 mg total) by mouth once daily.    FLUTICASONE PROPIONATE (FLONASE) 50 MCG/ACTUATION NASAL SPRAY    1 spray (50 mcg total) by Each Nostril route 2 (two) times a day.    GALCANEZUMAB-GNLM 120 MG/ML PNIJ    Inject 1 mL (120 mg total) into the skin every 28 days.    LASMIDITAN (REYVOW) TABLET 100 MG    Take 1 tablet by mouth nightly as needed (Max 2-3 times a week).    LINACLOTIDE (LINZESS) 72 MCG CAP CAPSULE    Take 1 capsule (72 mcg total) by mouth before breakfast.    LINACLOTIDE (LINZESS) 72 MCG CAP CAPSULE    Take 2 capsules (144 mcg total) by mouth daily as needed.    OMEPRAZOLE (PRILOSEC) 20 MG CAPSULE    Take 20 mg by mouth as needed.    ONDANSETRON (ZOFRAN-ODT) 4 MG TBDL    Take 1 tablet (4 mg total) by mouth every 6 (six) hours as needed (Nausea).    PRAZOSIN (MINIPRESS) 1 MG CAP    Take 1 capsule (1 mg total) by mouth every evening.    QUETIAPINE (SEROQUEL) 50 MG TABLET    Take 1 tablet (50 mg total) by mouth every evening.    SULFACETAMIDE SODIUM-SULFUR 10-5 % (W/W) CLSR    APPLY a small amount to skin once a day]    USTEKINUMAB (STELARA) 90 MG/ML SYRG SYRINGE    Inject 1 mL (90 mg total) into the skin every 8 weeks.    ZILXI 1.5 % FOAM    Apply topically every evening.     Objective:     There were no vitals filed for this visit.    Lab Results   Component Value Date    WBC 10.92 06/21/2024    HGB 13.6 06/21/2024    HCT 41.1 06/21/2024    MCV 85 06/21/2024     (H) 06/21/2024       BMP  Lab Results   Component Value Date     03/13/2024    K 4.4 03/13/2024     03/13/2024    CO2 24 03/13/2024    BUN 10 03/13/2024    CREATININE 0.8 03/13/2024    CALCIUM 9.9 03/13/2024    ANIONGAP 7 (L) 03/13/2024    EGFRNORACEVR >60.0 03/13/2024     Lab Results   Component Value Date     ALT 32 03/13/2024    AST 21 03/13/2024    ALKPHOS 86 03/13/2024    BILITOT 0.3 03/13/2024         Physical Exam  Pulmonary:      Effort: Pulmonary effort is normal. No respiratory distress.   Neurological:      Mental Status: She is alert and oriented to person, place, and time.        Assessment:     Problem List Items Addressed This Visit          Oncology    Thrombocytosis     H/o ITP now with thrombocytosis s/p splenectomy. Myeloproliferative workup negative. Most likely reactive to splenectomy. Continue to monitor. Continue ASA 81 mg PO daily. Consider repeat bone marrow biopsy if significant progression from baseline 500 range         Relevant Orders    Comprehensive Metabolic Panel    CBC Auto Differential    Iron and TIBC    Ferritin    RESOLVED: Iron deficiency anemia due to chronic blood loss - Primary     No iron deficiency. Iron levels on higher end of normal. Avoid iron supplementation          Relevant Orders    Comprehensive Metabolic Panel    CBC Auto Differential    Iron and TIBC    Ferritin         Plan:     Iron deficiency anemia due to chronic blood loss  -     Comprehensive Metabolic Panel; Future; Expected date: 06/25/2024  -     CBC Auto Differential; Future; Expected date: 06/25/2024  -     Iron and TIBC; Future; Expected date: 06/25/2024  -     Ferritin; Future; Expected date: 06/25/2024    Thrombocytosis  -     Comprehensive Metabolic Panel; Future; Expected date: 06/25/2024  -     CBC Auto Differential; Future; Expected date: 06/25/2024  -     Iron and TIBC; Future; Expected date: 06/25/2024  -     Ferritin; Future; Expected date: 06/25/2024            Med Onc Chart Routing      Follow up with physician    Follow up with JOSEPH 3 months.   Infusion scheduling note    Injection scheduling note    Labs CBC, CMP, ferritin and iron and TIBC   Scheduling:  Preferred lab:  Lab interval:  1-2 days prior   Imaging None      Pharmacy appointment No pharmacy appointment needed      Other referrals        No additional referrals needed         DEAN Morales-C

## 2024-06-25 NOTE — ASSESSMENT & PLAN NOTE
H/o ITP now with thrombocytosis s/p splenectomy. Myeloproliferative workup negative. Most likely reactive to splenectomy. Continue to monitor. Continue ASA 81 mg PO daily. Consider repeat bone marrow biopsy if significant progression from baseline 500 range

## 2024-07-24 ENCOUNTER — PATIENT MESSAGE (OUTPATIENT)
Dept: PSYCHIATRY | Facility: CLINIC | Age: 19
End: 2024-07-24
Payer: COMMERCIAL

## 2024-07-24 RX ORDER — CLONAZEPAM 0.5 MG/1
0.5 TABLET ORAL DAILY PRN
Qty: 30 TABLET | Refills: 0 | Status: SHIPPED | OUTPATIENT
Start: 2024-07-24 | End: 2025-07-24

## 2024-07-24 RX ORDER — QUETIAPINE FUMARATE 50 MG/1
50 TABLET, FILM COATED ORAL NIGHTLY
Qty: 30 TABLET | Refills: 2 | Status: SHIPPED | OUTPATIENT
Start: 2024-07-24 | End: 2025-07-24

## 2024-07-25 ENCOUNTER — PATIENT MESSAGE (OUTPATIENT)
Dept: NEUROLOGY | Facility: CLINIC | Age: 19
End: 2024-07-25
Payer: COMMERCIAL

## 2024-08-02 ENCOUNTER — OFFICE VISIT (OUTPATIENT)
Dept: PSYCHIATRY | Facility: CLINIC | Age: 19
End: 2024-08-02
Payer: COMMERCIAL

## 2024-08-02 DIAGNOSIS — F41.1 GAD (GENERALIZED ANXIETY DISORDER): Primary | ICD-10-CM

## 2024-08-02 DIAGNOSIS — F19.982 DRUG-INDUCED INSOMNIA: ICD-10-CM

## 2024-08-02 RX ORDER — BUPROPION HYDROCHLORIDE 150 MG/1
150 TABLET ORAL DAILY
Qty: 30 TABLET | Refills: 2 | Status: SHIPPED | OUTPATIENT
Start: 2024-08-02 | End: 2025-08-02

## 2024-08-13 ENCOUNTER — PATIENT MESSAGE (OUTPATIENT)
Dept: PSYCHIATRY | Facility: CLINIC | Age: 19
End: 2024-08-13
Payer: COMMERCIAL

## 2024-08-14 RX ORDER — BUPROPION HYDROCHLORIDE 300 MG/1
300 TABLET ORAL DAILY
Qty: 30 TABLET | Refills: 2 | Status: SHIPPED | OUTPATIENT
Start: 2024-08-14 | End: 2025-08-14

## 2024-08-21 ENCOUNTER — PATIENT MESSAGE (OUTPATIENT)
Dept: HEMATOLOGY/ONCOLOGY | Facility: CLINIC | Age: 19
End: 2024-08-21
Payer: COMMERCIAL

## 2024-08-23 ENCOUNTER — PATIENT MESSAGE (OUTPATIENT)
Dept: PSYCHIATRY | Facility: CLINIC | Age: 19
End: 2024-08-23
Payer: COMMERCIAL

## 2024-08-23 RX ORDER — CLONAZEPAM 0.5 MG/1
0.5 TABLET ORAL DAILY PRN
Qty: 30 TABLET | Refills: 0 | Status: SHIPPED | OUTPATIENT
Start: 2024-08-23 | End: 2025-08-23

## 2024-08-23 RX ORDER — QUETIAPINE FUMARATE 50 MG/1
50 TABLET, FILM COATED ORAL NIGHTLY
Qty: 30 TABLET | Refills: 2 | Status: SHIPPED | OUTPATIENT
Start: 2024-08-23 | End: 2025-08-23

## 2024-09-06 ENCOUNTER — LAB VISIT (OUTPATIENT)
Dept: LAB | Facility: HOSPITAL | Age: 19
End: 2024-09-06
Attending: INTERNAL MEDICINE
Payer: COMMERCIAL

## 2024-09-06 ENCOUNTER — TELEPHONE (OUTPATIENT)
Dept: GASTROENTEROLOGY | Facility: CLINIC | Age: 19
End: 2024-09-06

## 2024-09-06 ENCOUNTER — OFFICE VISIT (OUTPATIENT)
Dept: GASTROENTEROLOGY | Facility: CLINIC | Age: 19
End: 2024-09-06
Payer: COMMERCIAL

## 2024-09-06 ENCOUNTER — TELEPHONE (OUTPATIENT)
Dept: GASTROENTEROLOGY | Facility: CLINIC | Age: 19
End: 2024-09-06
Payer: COMMERCIAL

## 2024-09-06 DIAGNOSIS — K50.00 CROHN'S DISEASE OF SMALL INTESTINE WITHOUT COMPLICATION: Primary | ICD-10-CM

## 2024-09-06 DIAGNOSIS — K50.00 CROHN'S DISEASE OF SMALL INTESTINE WITHOUT COMPLICATION: ICD-10-CM

## 2024-09-06 DIAGNOSIS — R10.9 ABDOMINAL PAIN, UNSPECIFIED ABDOMINAL LOCATION: ICD-10-CM

## 2024-09-06 DIAGNOSIS — R10.9 ABDOMINAL PAIN, UNSPECIFIED ABDOMINAL LOCATION: Primary | ICD-10-CM

## 2024-09-06 LAB
ALBUMIN SERPL BCP-MCNC: 4.3 G/DL (ref 3.5–5.2)
ALP SERPL-CCNC: 97 U/L (ref 55–135)
ALT SERPL W/O P-5'-P-CCNC: 24 U/L (ref 10–44)
ANION GAP SERPL CALC-SCNC: 10 MMOL/L (ref 8–16)
AST SERPL-CCNC: 18 U/L (ref 10–40)
BASOPHILS # BLD AUTO: 0.13 K/UL (ref 0–0.2)
BASOPHILS NFR BLD: 1.1 % (ref 0–1.9)
BILIRUB SERPL-MCNC: 0.3 MG/DL (ref 0.1–1)
BUN SERPL-MCNC: 7 MG/DL (ref 6–20)
CALCIUM SERPL-MCNC: 9.8 MG/DL (ref 8.7–10.5)
CHLORIDE SERPL-SCNC: 105 MMOL/L (ref 95–110)
CO2 SERPL-SCNC: 22 MMOL/L (ref 23–29)
CREAT SERPL-MCNC: 0.8 MG/DL (ref 0.5–1.4)
DIFFERENTIAL METHOD BLD: ABNORMAL
EOSINOPHIL # BLD AUTO: 0.5 K/UL (ref 0–0.5)
EOSINOPHIL NFR BLD: 4 % (ref 0–8)
ERYTHROCYTE [DISTWIDTH] IN BLOOD BY AUTOMATED COUNT: 13.4 % (ref 11.5–14.5)
EST. GFR  (NO RACE VARIABLE): >60 ML/MIN/1.73 M^2
FERRITIN SERPL-MCNC: 151 NG/ML (ref 20–300)
GLUCOSE SERPL-MCNC: 83 MG/DL (ref 70–110)
HBV CORE AB SERPL QL IA: NORMAL
HBV SURFACE AG SERPL QL IA: NORMAL
HCT VFR BLD AUTO: 44 % (ref 37–48.5)
HGB BLD-MCNC: 14 G/DL (ref 12–16)
IMM GRANULOCYTES # BLD AUTO: 0.05 K/UL (ref 0–0.04)
IMM GRANULOCYTES NFR BLD AUTO: 0.4 % (ref 0–0.5)
IRON SERPL-MCNC: 34 UG/DL (ref 30–160)
LYMPHOCYTES # BLD AUTO: 2.3 K/UL (ref 1–4.8)
LYMPHOCYTES NFR BLD: 20.2 % (ref 18–48)
MCH RBC QN AUTO: 27.8 PG (ref 27–31)
MCHC RBC AUTO-ENTMCNC: 31.8 G/DL (ref 32–36)
MCV RBC AUTO: 88 FL (ref 82–98)
MONOCYTES # BLD AUTO: 1.2 K/UL (ref 0.3–1)
MONOCYTES NFR BLD: 10.1 % (ref 4–15)
NEUTROPHILS # BLD AUTO: 7.3 K/UL (ref 1.8–7.7)
NEUTROPHILS NFR BLD: 64.2 % (ref 38–73)
NRBC BLD-RTO: 0 /100 WBC
PLATELET # BLD AUTO: 590 K/UL (ref 150–450)
PMV BLD AUTO: 9.5 FL (ref 9.2–12.9)
POTASSIUM SERPL-SCNC: 4 MMOL/L (ref 3.5–5.1)
PROT SERPL-MCNC: 7.5 G/DL (ref 6–8.4)
RBC # BLD AUTO: 5.03 M/UL (ref 4–5.4)
SATURATED IRON: 9 % (ref 20–50)
SODIUM SERPL-SCNC: 137 MMOL/L (ref 136–145)
TOTAL IRON BINDING CAPACITY: 377 UG/DL (ref 250–450)
TRANSFERRIN SERPL-MCNC: 255 MG/DL (ref 200–375)
WBC # BLD AUTO: 11.44 K/UL (ref 3.9–12.7)

## 2024-09-06 PROCEDURE — 85025 COMPLETE CBC W/AUTO DIFF WBC: CPT | Performed by: INTERNAL MEDICINE

## 2024-09-06 PROCEDURE — 86704 HEP B CORE ANTIBODY TOTAL: CPT | Performed by: INTERNAL MEDICINE

## 2024-09-06 PROCEDURE — 83540 ASSAY OF IRON: CPT | Performed by: INTERNAL MEDICINE

## 2024-09-06 PROCEDURE — 80053 COMPREHEN METABOLIC PANEL: CPT | Performed by: INTERNAL MEDICINE

## 2024-09-06 PROCEDURE — 82728 ASSAY OF FERRITIN: CPT | Performed by: INTERNAL MEDICINE

## 2024-09-06 PROCEDURE — 36415 COLL VENOUS BLD VENIPUNCTURE: CPT | Mod: PO | Performed by: INTERNAL MEDICINE

## 2024-09-06 PROCEDURE — 87340 HEPATITIS B SURFACE AG IA: CPT | Performed by: INTERNAL MEDICINE

## 2024-09-06 PROCEDURE — 86480 TB TEST CELL IMMUN MEASURE: CPT | Performed by: INTERNAL MEDICINE

## 2024-09-06 RX ORDER — DICYCLOMINE HYDROCHLORIDE 10 MG/1
10 CAPSULE ORAL
Qty: 120 CAPSULE | Refills: 0 | Status: SHIPPED | OUTPATIENT
Start: 2024-09-06 | End: 2024-10-06

## 2024-09-06 NOTE — TELEPHONE ENCOUNTER
----- Message from Bertram Mcdaniel MD sent at 9/6/2024  1:03 PM CDT -----  Unfortunately let her know there is an interaction with klonopin due to sedative effects with tramadol and klonopin so I don't feel comfortable prescribing    SS  ----- Message -----  From: Merced Del Cid, RN  Sent: 9/6/2024  12:06 PM CDT  To: Bertram Mcdaniel MD      ----- Message -----  From: Ambar Ford  Sent: 9/6/2024  11:43 AM CDT  To: Jonas Burden Staff    Pt calling in regards to her tramadol not being called in , please call once called in       [facility] - [place] - [id number] [facility] [address]  Phone: [phone] Fax: [phone]        Confirmed patient's contact info below:  Contact Name: Mini Marcus  Phone Number: [phone]

## 2024-09-06 NOTE — TELEPHONE ENCOUNTER
Messaged pt regarding her f/u from today's visit 9/6/2024  - scheduled labs, f/u appt, and made her aware abt colonoscopy scheduling.

## 2024-09-06 NOTE — PATIENT INSTRUCTIONS
- restart asprin daily  - keep me posted about elevated platelets and any workup related  - keep me informed about if covid positive and make sure you have recovered by the time of your next injection  - - flu shot October 2024- can get this done at any local or [facility] pharmacy  - covid vaccine available and is recommended, you can get this at any local pharmacy  - avoid ibuprofen  - no ibuprofen at least 4-6 weeks before your scope

## 2024-09-06 NOTE — TELEPHONE ENCOUNTER
Called & spoke to pt  - Reviewed risk of tramadol & klonopin use causing sedation, respritoroy depression, coma, & death  - States she only takes klonopin once weekly  - Reviewed w/ Dr. Mcdaniel  - Offered pt to ricky Wilson given risks of tramadol and klonopin combo use  - Pt expressed understanding

## 2024-09-06 NOTE — PROGRESS NOTES
"     [facility]             Inflammatory Bowel Disease   Follow-up  Note              TODAY'S VISIT DATE:  9/6/2024    Chief Complaint:   Chief Complaint   Patient presents with    Crohn's Disease     With chronic constipation     PCP: Annie Santiago    Previous History:  Mini Marcus is a 19 y.o. with Crohn's disease (ileum), chronic ITP, chronic constipation (on linzess) who was doing well until hospitalization 4/30/2017 at which time she was diagnosed with acute ITP which responded to IVIG and later started on promacta 50 mg/d (family concerned that this worsened abd pain and discontinued 1/2021). In 1/2020 platelets were normal.  For generalized abd pain in 1/2021 pt had KUB and US that were normal and EGD significant for erythema in the duodenal bulb, moderate areas of erythema and nodularity in the stomach, normal esophagus (bx of esophagus normal, stomach HP neg chronic gastritis, duodenum normal) and colonoscopy significant for TI with few small ulcers and erythema (biopsies of colon normal and TI c/w focal erosive ileitis). At that time she was told she had "mild" case of Crohn's disease that did not require treatment and of not was not taking NSAIDs. Had spinal fusion 5/31/2019 and at that time had normal plts. She stayed on promacta varying doses of 25-75 mg/d from [id number]. Labs 1/2021 significant for anemia (hgb 8.5) and thrombocytopenia (plts 36-60k).  She was on nexium and carafate for abd pain with some improvement of symptoms though mom felt that promacta was cause and once discontinued this helped abdominal pain.  On 3/11/21 MRE c/w normal small bowel. In 3/2021 she reported mid abd pain worse with eating and ran out of nexium and taking carafate prn.  She had some mild weight loss with constipation (taking laxatives and recommended to add miralax and eventually started linzess).  Abdominal US 4/7/21 c/w hepatosplenomegaly and transabdominal pelvic US was normal.  On " "5/5/21 VCE showed normal SB.  IBD panel 5/20/21 c/w myeloperoxidase abs neg, proteinase 3 Ab neg, CRP normal. On 8/27/21 pt had repeat pelvic US normal and doppler abd US c/w hepatosplenomegaly with mildly elevated velocities in the celiac artery that were felt to be incidental and not due to celiac artery stenosis.  On 9/2021 CTA c/w again hepatosplenomegaly and HIDA c/w normal GB EF.  In 10/2021 EGD was normal (on nexium) and colonoscopy "terminal ileum normal and scope carefully withdrawn throughout the colon. There was inflammation in the terminal ileum".  Biopsies of terminal ileum c/w patchy active chronic inflammation, normal colon and normal lower and upper esophagus, stomach with HP neg patchy mild superficial chronic gastritis, normal duodenum. Patient started azathioprine 100 mg/d (increased to 150 mg/d 1 month ago) for terminal ileitis and seen for f/u 12/27/21 with continued left sided abd pain and workup 12/29/21 with US normal and CT A/P hepatosplenomegaly.  Pt was referred for cholecystectomy but surgeon decided not clinically indicated and not recommended. In 9/2021 had IUD placed and removed and while she had it continued vaginal bleeding and KASHIF. She continued left side abdominal pain with no obvious cause. She continued on azathioprine 100 mg/day and 1 month ago it was increased to 150 mg/d. She also continues on linzess 72 mcg every other day for IBS-constipation.  On 1/3/22 labs Hgb 7.6, plts 251, normal CMP. On left side of abdomen, constant soreness with some worsening throughout the day triggered by foods (fried, heavy foods, pizza, salsa).  If she has no BM then may have bloating but no left sided abdominal pain. Patient is having 0-1 soft BMs/d, no blood. Low appetite and would avoid eating due to pain but since starting high dose prednisone appetite improved. Has nausea on "empty stomach."  Pt is currently on prednisone 40 mg po BID for ITP and started 14 day course on 4/14/22. Patient " was hospitalized due to vaginal bleeding and clot pushed IUD and so received IVIG and 1 unit PRBC 4/3 and had 2nd dose of IVIG 4/4/22 and also received TXA IV (clotting medicine) and this was in a setting of platelets 8 k. Pt had headache and imaging of head normal.  She had IVFs, reglan and pain meds.  I met patient for the first time in the IBD Clinic on 4/18/2022 at which time she continued on azathioprine 150 mg/day and linzess 72 mcg qod.  In 12/2022 patient underwent splenectomy for ITP though since then thrombocytosis and requiring aspirin to prevent clots.  In 4/2022 fecal calprotectin 49.5. IN 2/2023 colonoscopy showed liquid stool in entire colon with fair visualization and erosions with apthous ulcers in the terminal ileum and bx c/w moderate chronic active inflammation.  Colonsocopy 12/2023 significant for normal colon with 2 small TI apthous ulcers.  Due to effectiveness of stelara we discontinued azathioprine (likely not helping) in 3/2024.     Interval History:  - current IBD meds: stelara 90 mg SC q 8 weeks (started 5/25/23-had headaches, LD 8/14, ND 10/9)  - BMs (constipation)- dulcolax 2 pills prn, linzess expensive and not covered by insurance- last took 2 mos ago but it helps   - cough/nasal congestion/headache  - abdominal pain- ass constipation sometimes and sometimes with certain foods (pizza/canes)- manageable- sharp, left side, intermittent, lasts few hours and sometimes overnight  - anxiety - stable, on prozac and klonopin, SE night terrors   - platelets high and plans to consider BM biopsy  - NSAID use: Yes - ibuprofen takes for HA and stomach pain- 2-3 times/week  - Narcotic use: No  - Alternative/complementary meds for IBD: No    Prior Pertinent Surgeries:   12/15/22 splenectomy for ITP    Last pertinent Endoscopy/Imaging:  3/11/21 MRE c/w normal small bowel  5/5/21 VCE showed normal SB  8/2021 doppler abd US:  hepatosplenomegaly with mildly elevated velocities in the celiac artery that  were felt to be incidental and not due to celiac artery stenosis.  On 9/2021 CTA c/w again hepatosplenomegaly and HIDA c/w normal GB EF  10/2021 EGD was normal. normal lower and upper esophagus, stomach with HP neg patchy mild superficial chronic gastritis, normal duodenum  12/29/21 US abdomen: hepatosplenomegaly   12/29/21 CT A/P:  borderline enlarged spleen.  12/2023 colonoscopy: normal colon and 2 small apthous ulcers in the TI. Biopsies of ileum c/w active enteritis which is mild with focal ulceration      Therapeutic Drug Monitoring Labs:  None    Prior IBD Therapies:  Azathioprine 150 mg/d    Vaccinations:  Lab Results   Component Value Date    HEPBSURFABQU POSITIVE 04/18/2022    HEPBSURFABQU 921 04/18/2022     Lab Results   Component Value Date    HEPAIGG Positive 04/18/2022     Lab Results   Component Value Date    VARICELLAZOS 3.37 (H) 04/18/2022    VARICELLAINT Positive (A) 04/18/2022     Lab Results   Component Value Date    MUMPSIGGSCRE 2.47 (H) 04/18/2022    MUMPSIGGINTE Positive (A) 04/18/2022      Lab Results   Component Value Date    RUBEOLAIGGAN 2.55 (H) 04/18/2022    RUBEOLAINTER Positive (A) 04/18/2022     Immunization History   Administered Date(s) Administered    DTaP 09/14/2005, 03/03/2008    DTaP / Hep B / IPV 2004, 2004, 2004    HIB 2004, 2004, 2004    HPV 9-Valent 11/07/2019, 02/26/2020, 07/09/2020    HiB PRP-T 06/07/2005, 05/17/2022    IPV 03/03/2008    Influenza - Quadrivalent - PF *Preferred* (6 months and older) 12/05/2014, 10/22/2016, 11/03/2017, 11/19/2018, 10/01/2021, 10/26/2023    Influenza - Trivalent (ADULT) 2004, 11/19/2018, 11/01/2019, 11/24/2020    Influenza - Trivalent - PF (ADULT) 11/08/2005, 10/27/2006, 11/11/2008, 10/26/2009, 11/06/2010, 10/08/2011, 10/06/2012, 10/05/2013    MMR 06/07/2005    MMRV 03/03/2008    Meningococcal B, OMV 05/17/2022, 07/18/2022    Meningococcal Conjugate (MCV4P) 04/03/2015, 03/04/2020    Pneumococcal  Conjugate - 13 Valent 05/17/2022    Pneumococcal Conjugate - 7 Valent 2004, 2004, 2004, 03/15/2005    Pneumococcal Polysaccharide - 23 Valent 07/18/2022    Tdap 04/03/2015    Varicella 03/15/2005    Zoster Recombinant 04/05/2023, 09/06/2023   Flu shot: recommended yearly   COVID vaccine/booster:  per CDC recommendations  RSV:  after age 49 yo  Tetanus (Tdap):  4/2025  PCV 20: 7/2027    Review of Systems   Constitutional:  Negative for chills, fever and weight loss.   HENT:          No oral ulcers, dysphagia, oral thrush   Eyes:  Negative for blurred vision, pain and redness.   Respiratory:  Negative for cough and shortness of breath.    Cardiovascular:  Negative for chest pain.   Gastrointestinal:  Positive for abdominal pain and nausea. Negative for heartburn and vomiting.   Genitourinary:  Negative for dysuria and hematuria.   Musculoskeletal:  Negative for back pain and joint pain.   Skin:  Negative for rash.   Psychiatric/Behavioral:  Negative for depression. The patient is nervous/anxious. The patient does not have insomnia.      All Medical History/Surgical History/Family History/Social History/Allergies have been reviewed and updated in EMR    Outpatient Medications Marked as Taking for the 9/6/24 encounter (Office Visit) with Bertram Mcdaniel MD   Medication Sig Dispense Refill    aspirin 81 mg Cap Take 81 mg by mouth once daily.      buPROPion (WELLBUTRIN XL) 300 MG 24 hr tablet Take 1 tablet (300 mg total) by mouth once daily. 30 tablet 2    clindamycin (CLEOCIN T) 1 % external solution APPLY EVERY DAY use as spot treatment      clonazePAM (KLONOPIN) 0.5 MG tablet Take 1 tablet (0.5 mg total) by mouth daily as needed for Anxiety. 30 tablet 0    FLUoxetine (PROZAC) 40 MG capsule Take 1 capsule (40 mg total) by mouth once daily. 30 capsule 2    galcanezumab-gnlm 120 mg/mL PnIj Inject 1 mL (120 mg total) into the skin every 28 days. 3 mL 11    lasmiditan (REYVOW) tablet 100 mg Take 1 tablet  by mouth nightly as needed (Max 2-3 times a week). 9 tablet 3    linaCLOtide (LINZESS) 72 mcg Cap capsule Take 2 capsules (144 mcg total) by mouth daily as needed. 25 capsule 5    omeprazole (PRILOSEC) 20 MG capsule Take 20 mg by mouth as needed.      ondansetron (ZOFRAN-ODT) 4 MG TbDL Take 1 tablet (4 mg total) by mouth every 6 (six) hours as needed (Nausea). 20 tablet 1    QUEtiapine (SEROQUEL) 50 MG tablet Take 1 tablet (50 mg total) by mouth every evening. 30 tablet 2    sulfacetamide sodium-sulfur 10-5 % (w/w) Clsr APPLY a small amount to skin once a day]      ustekinumab (STELARA) 90 mg/mL Syrg syringe Inject 1 mL (90 mg total) into the skin every 8 weeks. 1 mL 1    ZILXI 1.5 % Foam Apply topically every evening.       Vital Signs:  There were no vitals taken for this visit.   Physical Exam    Labs:   Lab Results   Component Value Date    CALPROTECTIN <27.1 06/21/2023     Lab Results   Component Value Date    HEPBSAG Non-reactive 09/06/2023    HEPBCAB Non-reactive 09/06/2023     Lab Results   Component Value Date    TBGOLDPLUS Negative 09/06/2023     Lab Results   Component Value Date    ELCGCUUM63FX 32 04/18/2022    BAYKKZJC98 337 09/06/2023     Lab Results   Component Value Date    WBC 10.92 06/21/2024    HGB 13.6 06/21/2024    HCT 41.1 06/21/2024    MCV 85 06/21/2024     (H) 06/21/2024     Lab Results   Component Value Date    CREATININE 0.8 03/13/2024    ALBUMIN 4.2 03/13/2024    BILITOT 0.3 03/13/2024    ALKPHOS 86 03/13/2024    AST 21 03/13/2024    ALT 32 03/13/2024     Assessment/Plan:  Mini Marcus is a 19 y.o. female with Crohn's disease (ileum), chronic constipation (on linzess), ITP S/P splenectomy with thromobocytosis, KASHIF, migraine HA.     Patient continues on stelara every 8 weeks and no longer taking imuran with no changes in symptoms since 3/2024.  She continues with chronic constipation and takes dulcolax and prior to this was taking linzess but due to cost not taking regularly and  encouraged to see if there are coupons or through good rx she can get less expensive medication.  We will proceed with a colonoscopy 12/2024 and pt reminded to avoid NSAIDs especially ibuprofen she is taking about 6 weeks prior to scope so this does not influence the results. Today she reports ongoing abdominal pain related to sometimes food and constipation and has had help with tramadol so gave her a limited supply of tramadol to use sparingly. She will be seeing her PCP today for URI symptoms and will inform us if she is covid positive and told her to be sure symptoms resolved by the time of her next stelara injection. She has ongoing thrombocytosis and not taking aspirin regularly due to concerns of NSAIDs and CD and I encouraged her to restart this as she is seeing hematology and they continue workup for this.     # URI without fevers- headache/congestion/cough  - seeing PCP today  - will let us know if covid positive  - pt told to be sure symptoms fully resolved prior to next dose of stelara due on 10/9    # Abdominal pain- constipation  - chronic and related to certain food and constipation  - encouraged to continue dulcolax and take linzess more regularly and gave pt more affordable options for this (currently costs $50/month and will look into coupons and good rx)  - consider referral to lower motility specialists if ongoing but will defer for now    # Crohn's disease (ileum):  - note: thrombocytosis, risk of clots- encouraged to restart aspirin daily, followed by hematology closely   - continue stelara 90 mg SC q 8 weeks  - stool calprotectin- normal 6/2023  - colonoscopy- 12/2024- suprep, miralax daily 1 week prior, low residue diet, was not able to tolerate golytely in past  - drug monitoring labs: CBC/CMP q 6 mos (9/2024), TPMT (normal 11/2021), Hep B testing (4/2022 HBcAb +/HBsAg/HBV DNA neg, 4/2023 repeat HBsAg/HBcAb neg, due 9/2024), TB quantiferon (9/2024)    # Heme issues- ITP S/P splenectomy and  KASHIF  - followed by hematology closely and workup in progress for thrombocytosis- encouraged to restart aspirin to prevent clots    # Immunodeficiency due to long term immunosuppressive drug therapy and IBD specific health maintenance:  CRC risk- ileal disease, average risk  Skin exam yearly - normal 8/2024  Risk for osteopenia/osteoporosis- none  Pap smear yearly- will get one 3/2025  Vitamin D- normal, not on supplementation   Vaccines- S/P splenectomy, flu/covid vaccine recommended     Visit today is associated with current or anticipated ongoing medical care related to this patient's single serious condition/complex condition crohn's disease in setting of splenectomy of ITP, abd pain.     Follow up in about 6 months (around 3/6/2025) for in person.    The patient location is: Home    The chief complaint leading to consultation is: chronic constipation and Crohn's disease    Visit type: audiovisual    Face to Face time with patient: 20 minutes  40 minutes of total time spent on the encounter, which includes face to face time and non-face to face time preparing to see the patient (eg, review of tests), Obtaining and/or reviewing separately obtained history, Documenting clinical information in the electronic or other health record, Independently interpreting results (not separately reported) and communicating results to the patient/family/caregiver, or Care coordination (not separately reported).     Each patient to whom he or she provides medical services by telemedicine is:  (1) informed of the relationship between the physician and patient and the respective role of any other health care provider with respect to management of the patient; and (2) notified that he or she may decline to receive medical services by telemedicine and may withdraw from such care at any time.    Bertram Mcdaniel MD  Department of Gastroenterology  Medical Director, Inflammatory Bowel Disease

## 2024-09-09 ENCOUNTER — TELEPHONE (OUTPATIENT)
Dept: ENDOSCOPY | Facility: HOSPITAL | Age: 19
End: 2024-09-09
Payer: COMMERCIAL

## 2024-09-09 LAB
GAMMA INTERFERON BACKGROUND BLD IA-ACNC: 0.02 IU/ML
M TB IFN-G CD4+ BCKGRND COR BLD-ACNC: 0.02 IU/ML
M TB IFN-G CD4+ BCKGRND COR BLD-ACNC: 0.02 IU/ML
MITOGEN IGNF BCKGRD COR BLD-ACNC: 9.98 IU/ML
TB GOLD PLUS: NEGATIVE

## 2024-09-09 NOTE — TELEPHONE ENCOUNTER
"Contacted patient and spoke with patient    Patient wants a Monday morning cordelia after 12/10 once schedule opens we will contact her         Kaylynn Ty RN  P [facility] Patients; Kyara Duque MA  Cc: JONATAN Burden Staff  Caller: Unspecified (3 days ago,  2:18 PM)  Procedure: Colonoscopy    Diagnosis: Crohn's disease    Procedure Timin-12 weeks; after 12/10/24    *If within 4 weeks selected, please clotilde as high priority*    *If greater than 12 weeks, please select "5-12 weeks" and delay sending until 2 months prior to requested date*    Provider: JENNY Mcdaniel    Location: Any Site    Additional Scheduling Information: No scheduling concerns    Prep Specifications:Standard prep; Supprep, miralax daily 1 week prior, low residue diet    Is the patient taking a GLP-1 Agonist:no    Have you attached a patient to this message: yes  "

## 2024-09-26 ENCOUNTER — PATIENT MESSAGE (OUTPATIENT)
Dept: PSYCHIATRY | Facility: CLINIC | Age: 19
End: 2024-09-26
Payer: COMMERCIAL

## 2024-09-27 ENCOUNTER — TELEPHONE (OUTPATIENT)
Dept: ENDOSCOPY | Facility: HOSPITAL | Age: 19
End: 2024-09-27
Payer: COMMERCIAL

## 2024-09-27 ENCOUNTER — LAB VISIT (OUTPATIENT)
Dept: LAB | Facility: HOSPITAL | Age: 19
End: 2024-09-27
Attending: NURSE PRACTITIONER
Payer: COMMERCIAL

## 2024-09-27 DIAGNOSIS — D75.839 THROMBOCYTOSIS: ICD-10-CM

## 2024-09-27 DIAGNOSIS — K50.919 CROHN'S DISEASE WITH COMPLICATION, UNSPECIFIED GASTROINTESTINAL TRACT LOCATION: Primary | ICD-10-CM

## 2024-09-27 DIAGNOSIS — D50.0 IRON DEFICIENCY ANEMIA DUE TO CHRONIC BLOOD LOSS: ICD-10-CM

## 2024-09-27 DIAGNOSIS — Z12.11 ENCOUNTER FOR SCREENING COLONOSCOPY FOR NON-HIGH-RISK PATIENT: Primary | ICD-10-CM

## 2024-09-27 LAB
ALBUMIN SERPL BCP-MCNC: 4.3 G/DL (ref 3.5–5.2)
ALP SERPL-CCNC: 104 U/L (ref 55–135)
ALT SERPL W/O P-5'-P-CCNC: 24 U/L (ref 10–44)
ANION GAP SERPL CALC-SCNC: 11 MMOL/L (ref 8–16)
AST SERPL-CCNC: 22 U/L (ref 10–40)
BASOPHILS # BLD AUTO: 0.1 K/UL (ref 0–0.2)
BASOPHILS NFR BLD: 1.2 % (ref 0–1.9)
BILIRUB SERPL-MCNC: 0.3 MG/DL (ref 0.1–1)
BUN SERPL-MCNC: 9 MG/DL (ref 6–20)
CALCIUM SERPL-MCNC: 9.7 MG/DL (ref 8.7–10.5)
CHLORIDE SERPL-SCNC: 104 MMOL/L (ref 95–110)
CO2 SERPL-SCNC: 22 MMOL/L (ref 23–29)
CREAT SERPL-MCNC: 0.8 MG/DL (ref 0.5–1.4)
DIFFERENTIAL METHOD BLD: ABNORMAL
EOSINOPHIL # BLD AUTO: 0.2 K/UL (ref 0–0.5)
EOSINOPHIL NFR BLD: 2.9 % (ref 0–8)
ERYTHROCYTE [DISTWIDTH] IN BLOOD BY AUTOMATED COUNT: 13.1 % (ref 11.5–14.5)
EST. GFR  (NO RACE VARIABLE): >60 ML/MIN/1.73 M^2
FERRITIN SERPL-MCNC: 137 NG/ML (ref 20–300)
GLUCOSE SERPL-MCNC: 77 MG/DL (ref 70–110)
HCT VFR BLD AUTO: 41.2 % (ref 37–48.5)
HGB BLD-MCNC: 13.4 G/DL (ref 12–16)
IMM GRANULOCYTES # BLD AUTO: 0.02 K/UL (ref 0–0.04)
IMM GRANULOCYTES NFR BLD AUTO: 0.2 % (ref 0–0.5)
IRON SERPL-MCNC: 66 UG/DL (ref 30–160)
LYMPHOCYTES # BLD AUTO: 2.6 K/UL (ref 1–4.8)
LYMPHOCYTES NFR BLD: 30.4 % (ref 18–48)
MCH RBC QN AUTO: 27.9 PG (ref 27–31)
MCHC RBC AUTO-ENTMCNC: 32.5 G/DL (ref 32–36)
MCV RBC AUTO: 86 FL (ref 82–98)
MONOCYTES # BLD AUTO: 1 K/UL (ref 0.3–1)
MONOCYTES NFR BLD: 11.5 % (ref 4–15)
NEUTROPHILS # BLD AUTO: 4.5 K/UL (ref 1.8–7.7)
NEUTROPHILS NFR BLD: 54 % (ref 38–73)
NRBC BLD-RTO: 0 /100 WBC
PLATELET # BLD AUTO: 549 K/UL (ref 150–450)
PMV BLD AUTO: 9.1 FL (ref 9.2–12.9)
POTASSIUM SERPL-SCNC: 4.1 MMOL/L (ref 3.5–5.1)
PROT SERPL-MCNC: 7.4 G/DL (ref 6–8.4)
RBC # BLD AUTO: 4.81 M/UL (ref 4–5.4)
SATURATED IRON: 17 % (ref 20–50)
SODIUM SERPL-SCNC: 137 MMOL/L (ref 136–145)
TOTAL IRON BINDING CAPACITY: 380 UG/DL (ref 250–450)
TRANSFERRIN SERPL-MCNC: 257 MG/DL (ref 200–375)
WBC # BLD AUTO: 8.4 K/UL (ref 3.9–12.7)

## 2024-09-27 PROCEDURE — 80053 COMPREHEN METABOLIC PANEL: CPT | Performed by: NURSE PRACTITIONER

## 2024-09-27 PROCEDURE — 36415 COLL VENOUS BLD VENIPUNCTURE: CPT | Mod: PO | Performed by: NURSE PRACTITIONER

## 2024-09-27 PROCEDURE — 82728 ASSAY OF FERRITIN: CPT | Performed by: NURSE PRACTITIONER

## 2024-09-27 PROCEDURE — 85025 COMPLETE CBC W/AUTO DIFF WBC: CPT | Mod: PO | Performed by: NURSE PRACTITIONER

## 2024-09-27 PROCEDURE — 83540 ASSAY OF IRON: CPT | Performed by: NURSE PRACTITIONER

## 2024-09-27 RX ORDER — SODIUM, POTASSIUM,MAG SULFATES 17.5-3.13G
1 SOLUTION, RECONSTITUTED, ORAL ORAL DAILY
Qty: 1 KIT | Refills: 0 | Status: SHIPPED | OUTPATIENT
Start: 2024-09-27 | End: 2024-09-29

## 2024-09-27 RX ORDER — CLONAZEPAM 0.5 MG/1
0.5 TABLET ORAL DAILY PRN
Qty: 30 TABLET | Refills: 0 | Status: SHIPPED | OUTPATIENT
Start: 2024-09-27 | End: 2025-09-27

## 2024-09-27 NOTE — TELEPHONE ENCOUNTER
Spoke to pt to schedule procedure(s) Colonoscopy       Physician to perform procedure(s) Dr. ROHAN Mcdaniel  Date of Procedure (s) 12/17/24  Arrival Time 12:30 PM  Time of Procedure(s) 1:30 PM   Location of Procedure(s) [facility] [facility]  Type of Rx Prep sent to patient: Suprep  Instructions provided to patient via [facility]    Patient was informed on the following information and verbalized understanding. Screening questionnaire reviewed with patient and complete. If procedure requires anesthesia, a responsible adult needs to be present to accompany the patient home, patient cannot drive after receiving anesthesia. Appointment details are tentative, especially check-in time. Patient will receive a prep-op call 7 days prior to confirm check-in time for procedure. If applicable the patient should contact their pharmacy to verify Rx for procedure prep is ready for pick-up. Patient was advised to call the scheduling department at [phone] if pharmacy states no Rx is available. Patient was advised to call the endoscopy scheduling department if any questions or concerns arise.      [facility] Scheduling Department

## 2024-09-27 NOTE — TELEPHONE ENCOUNTER
"----- Message from Kaylynn Ty RN sent at 2024  2:18 PM CDT -----  Procedure: Colonoscopy    Diagnosis: Crohn's disease    Procedure Timin-12 weeks; after 12/10/24    #If within 4 weeks selected, please clotilde as high priority#    #If greater than 12 weeks, please select "5-12 weeks" and delay sending until 2 months prior to requested date#     Provider: JENNY Mcdaniel    Location: Any Site    Additional Scheduling Information: No scheduling concerns    Prep Specifications:Standard prep; Supprep, miralax daily 1 week prior, low residue diet    Is the patient taking a GLP-1 Agonist:no    Have you attached a patient to this message: yes  "

## 2024-10-01 ENCOUNTER — OFFICE VISIT (OUTPATIENT)
Dept: HEMATOLOGY/ONCOLOGY | Facility: CLINIC | Age: 19
End: 2024-10-01
Payer: COMMERCIAL

## 2024-10-01 DIAGNOSIS — N92.1 MENORRHAGIA WITH IRREGULAR CYCLE: ICD-10-CM

## 2024-10-01 DIAGNOSIS — Z86.2 HISTORY OF ITP: ICD-10-CM

## 2024-10-01 DIAGNOSIS — K50.00 CROHN'S DISEASE OF SMALL INTESTINE WITHOUT COMPLICATION: ICD-10-CM

## 2024-10-01 DIAGNOSIS — D75.839 THROMBOCYTOSIS: Primary | ICD-10-CM

## 2024-10-01 DIAGNOSIS — D50.0 IRON DEFICIENCY ANEMIA DUE TO CHRONIC BLOOD LOSS: ICD-10-CM

## 2024-10-01 PROCEDURE — 1159F MED LIST DOCD IN RCRD: CPT | Mod: CPTII,95,, | Performed by: NURSE PRACTITIONER

## 2024-10-01 PROCEDURE — 99214 OFFICE O/P EST MOD 30 MIN: CPT | Mod: 95,,, | Performed by: NURSE PRACTITIONER

## 2024-10-01 PROCEDURE — 1160F RVW MEDS BY RX/DR IN RCRD: CPT | Mod: CPTII,95,, | Performed by: NURSE PRACTITIONER

## 2024-10-01 RX ORDER — USTEKINUMAB 90 MG/ML
90 INJECTION, SOLUTION SUBCUTANEOUS
Qty: 1 ML | Refills: 1 | Status: ACTIVE | OUTPATIENT
Start: 2024-10-01

## 2024-10-01 NOTE — PROGRESS NOTES
Subjective:       Patient ID: Mini Marcus is a 19 y.o. female.    Chief Complaint: review labs.     The patient location is: home  The chief complaint leading to consultation is: review labs.    Visit type: audiovisual    Face to Face time with patient: 10 minutes  25 minutes of total time spent on the encounter, which includes face to face time and non-face to face time preparing to see the patient (eg, review of tests), Obtaining and/or reviewing separately obtained history, Documenting clinical information in the electronic or other health record, Independently interpreting results (not separately reported) and communicating results to the patient/family/caregiver, or Care coordination (not separately reported).         Each patient to whom he or she provides medical services by telemedicine is:  (1) informed of the relationship between the physician and patient and the respective role of any other health care provider with respect to management of the patient; and (2) notified that he or she may decline to receive medical services by telemedicine and may withdraw from such care at any time.    Notes:       HPI:a 19 y.o. female with Chron's disease (routine GI follow up. Colonoscopy UTD), presenting for follow-up with history of iron deficiency anemia and chronic ITP now s/p splenectomy and thrombocytosis. H/o menorrhagia which she notes improvement following recent birth control change per GYN.     Not on oral iron due to intolerance. H/o IV iron with Benadryl premedication due to preference despite discussion of risks associated.     In regards to her ITP, Several prior treatments for ITP including IVIG, steroids, Nplate and in December 2022 underwent splenectomy; she is had post splenectomy complications including recurrent infection/strep throat. She has had follow-up with Infectious Disease for post splenectomy vaccinations. She is already seen allergy and immunology no clear immunoglobulin deficiency or  history of IVIG.  She also reports follow up with ENT with plans for possible tonsillectomy given recurrent strep throat. Now s/p tonsillectomy 12/2023     Of note patient had bmbx 5/2022 for evaluation of persistent thrombocytopenia. It showed a normocellular marrow (80% total cellularity) with no increased blasts and trilineage hematopoiesis with left-shifted granulopoiesis, minimal erythroid hyperplasia, and marked megakaryocytic hyperplasia without significant dysplasia .      Patient has had recent persistent thrombocytosis thought likely to be multifactorial status post splenectomy, inflammation with Crohn's disease and or iron deficiency.     Today she presents for follow up of her iron deficiency and thrombocytosis. Today she feels well overall. Notes chronic fatigue.    Social History     Socioeconomic History    Marital status: Single   Tobacco Use    Smoking status: Never    Smokeless tobacco: Never   Substance and Sexual Activity    Alcohol use: Never    Drug use: Never    Sexual activity: Never     Social Drivers of Health     Financial Resource Strain: Medium Risk (3/7/2024)    Overall Financial Resource Strain (CARDIA)     Difficulty of Paying Living Expenses: Somewhat hard   Food Insecurity: No Food Insecurity (3/7/2024)    Hunger Vital Sign     Worried About Running Out of Food in the Last Year: Never true     Ran Out of Food in the Last Year: Never true   Transportation Needs: No Transportation Needs (3/7/2024)    PRAPARE - Transportation     Lack of Transportation (Medical): No     Lack of Transportation (Non-Medical): No   Physical Activity: Insufficiently Active (3/7/2024)    Exercise Vital Sign     Days of Exercise per Week: 3 days     Minutes of Exercise per Session: 40 min   Stress: Stress Concern Present (3/7/2024)    [employer] - Occupational Stress Questionnaire     Feeling of Stress : To some extent   Housing Stability: Low Risk  (3/7/2024)    Housing  Stability Vital Sign     Unable to Pay for Housing in the Last Year: No     Number of Places Lived in the Last Year: 1     Unstable Housing in the Last Year: No       Past Medical History:   Diagnosis Date    Chronic constipation     Chronic ITP (idiopathic thrombocytopenia)     Crohn's disease (ileum)     GERD (gastroesophageal reflux disease)     Inflammatory bowel disease     Iron deficiency anemia due to chronic blood loss 08/20/2018    Last Assessment & Plan:   Secondary to prolonged menstrual bleeding.  Continue ferrous sulfate 650mg PO BID.   Discussed other OCP options with Gyn.      Long-term current use of intravenous immunoglobulin (IVIG)     PONV (postoperative nausea and vomiting)        Family History   Problem Relation Name Age of Onset    No Known Problems Mother      Asthma Father      Hypertension Father      Gout Father      No Known Problems Brother      Hyperlipidemia Maternal Grandmother      Diabetes Paternal Grandmother      Hypertension Paternal Grandfather      No Known Problems Brother         Past Surgical History:   Procedure Laterality Date    CHOLECYSTECTOMY      patient denies this procedure    COLONOSCOPY N/A 02/13/2023    Procedure: COLONOSCOPY;  Surgeon: Bertram Mcdaniel MD;  Location: [facility] (4TH FLR);  Service: Endoscopy;  Laterality: N/A;  inst via portal  precall confirmed 2/6 EB    COLONOSCOPY N/A 12/19/2023    Procedure: COLONOSCOPY;  Surgeon: Bertram Mcdaniel MD;  [facility]  Service: Endoscopy;  Laterality: N/A;  Ref By: Dr. JENNY Mcdaniel, Sergio, instr. ent via portal. AC  Prep Specifications:Standard prep - - miralax daily starting 1 week prior and low residue diet week before, Suprep  12/12-lvm for precall-MS  12/12-pt confirmed appt-KPvt    INTRALUMINAL GASTROINTESTINAL TRACT IMAGING VIA CAPSULE N/A 05/05/2021    Procedure: IMAGING PROCEDURE, GI TRACT, INTRALUMINAL, VIA CAPSULE;  Surgeon: Mitchel Humphries RN;  Location: [facility];  Service: Endoscopy;   Laterality: N/A;    ROBOT-ASSISTED SURGICAL REMOVAL OF SPLEEN USING DA MARYANA XI N/A 12/15/2022    Procedure: XI ROBOTIC SPLENECTOMY;  Surgeon: Mitchel Simmons Jr., MD;  Location: [address];  Service: General;  Laterality: N/A;  CONSENT IN AM    SPINE SURGERY      rods    SPLENECTOMY      12/16/2022    TONSILLECTOMY, ADENOIDECTOMY      TONSILLECTOMY, ADENOIDECTOMY Bilateral 12/26/2023    Procedure: TONSILLECTOMY AND ADENOIDECTOMY;  Surgeon: Keisha Ventura MD;  Location: [facility] OR;  Service: ENT;  Laterality: Bilateral;  send tonsils for path       Review of Systems   Constitutional:  Negative for appetite change, chills, fatigue, fever and unexpected weight change.   HENT:  Negative for congestion, mouth sores, nosebleeds, sore throat, trouble swallowing and voice change.    Respiratory:  Negative for cough, chest tightness, shortness of breath and wheezing.    Cardiovascular:  Negative for chest pain and leg swelling.   Gastrointestinal:  Negative for abdominal distention, abdominal pain, blood in stool, constipation, diarrhea, nausea and vomiting.   Genitourinary:  Negative for difficulty urinating, dysuria and hematuria.   Musculoskeletal:  Negative for arthralgias, back pain and myalgias.   Skin:  Negative for pallor, rash and wound.   Allergic/Immunologic: Positive for immunocompromised state.   Neurological:  Negative for dizziness, syncope, weakness and headaches.   Hematological:  Negative for adenopathy. Does not bruise/bleed easily.   Psychiatric/Behavioral:  The patient is nervous/anxious.          Medication List with Changes/Refills   Current Medications    ASPIRIN 81 MG CAP    Take 81 mg by mouth once daily.    AZELASTINE (ASTELIN) 137 MCG (0.1 %) NASAL SPRAY    1 spray (137 mcg total) by Nasal route 2 (two) times daily.    BUPROPION (WELLBUTRIN XL) 300 MG 24 HR TABLET    Take 1 tablet (300 mg total) by mouth once daily.    CLINDAMYCIN (CLEOCIN T) 1 % EXTERNAL SOLUTION    APPLY EVERY DAY use  as spot treatment    CLONAZEPAM (KLONOPIN) 0.5 MG TABLET    Take 1 tablet (0.5 mg total) by mouth daily as needed for Anxiety.    DICYCLOMINE (BENTYL) 10 MG CAPSULE    Take 1 capsule (10 mg total) by mouth 4 (four) times daily before meals and nightly.    FLUOXETINE (PROZAC) 40 MG CAPSULE    Take 1 capsule (40 mg total) by mouth once daily.    GALCANEZUMAB-GNLM 120 MG/ML PNIJ    Inject 1 mL (120 mg total) into the skin every 28 days.    LASMIDITAN (REYVOW) TABLET 100 MG    Take 1 tablet by mouth nightly as needed (Max 2-3 times a week).    LINACLOTIDE (LINZESS) 72 MCG CAP CAPSULE    Take 2 capsules (144 mcg total) by mouth daily as needed.    OMEPRAZOLE (PRILOSEC) 20 MG CAPSULE    Take 20 mg by mouth as needed.    ONDANSETRON (ZOFRAN-ODT) 4 MG TBDL    Take 1 tablet (4 mg total) by mouth every 6 (six) hours as needed (Nausea).    QUETIAPINE (SEROQUEL) 50 MG TABLET    Take 1 tablet (50 mg total) by mouth every evening.    SULFACETAMIDE SODIUM-SULFUR 10-5 % (W/W) CLSR    APPLY a small amount to skin once a day]    USTEKINUMAB (STELARA) 90 MG/ML SYRG SYRINGE    Inject 1 mL (90 mg total) into the skin every 8 weeks.    ZILXI 1.5 % FOAM    Apply topically every evening.     Objective:     There were no vitals filed for this visit.    Lab Results   Component Value Date    WBC 8.40 09/27/2024    HGB 13.4 09/27/2024    HCT 41.2 09/27/2024    MCV 86 09/27/2024     (H) 09/27/2024       BMP  Lab Results   Component Value Date     09/27/2024    K 4.1 09/27/2024     09/27/2024    CO2 22 (L) 09/27/2024    BUN 9 09/27/2024    CREATININE 0.8 09/27/2024    CALCIUM 9.7 09/27/2024    ANIONGAP 11 09/27/2024    EGFRNORACEVR >60.0 09/27/2024     Lab Results   Component Value Date    ALT 24 09/27/2024    AST 22 09/27/2024    ALKPHOS 104 09/27/2024    BILITOT 0.3 09/27/2024         Physical Exam  Pulmonary:      Effort: Pulmonary effort is normal. No respiratory distress.   Neurological:      Mental Status: She is alert  and oriented to person, place, and time.        Assessment:     Problem List Items Addressed This Visit          Renal/    Menorrhagia with irregular cycle - Primary     She notes some improvement in menorrhagia however still with irregular menstrual cycles. IUD placed about 3 months ago. Ongoing GYN follow up. Unable to tolerate PO iron for maintenance            Hematology    History of ITP     S/p splenectomy having had failed several prior treatments. Now with thrombocytosis     Most recent CBC with stable thrombocytosis. Improved s/p prior iron infusion for iron deficiency. Previously intolerant to oral iron supplementation    Jak2, MPN, BCR-ABL all negative.     Of note prior bmbx 5/2022 5/31/22 for evaluation of thrombocytopenia. It showed a normocellular marrow (80% total cellularity) with no increased blasts and trilineage hematopoiesis with left-shifted granulopoiesis, minimal erythroid hyperplasia, and marked megakaryocytic hyperplasia without significant dysplasia     Stable thrombocytosis secondary to splenectomy. Continue ASA 81 mg PO daily. For now will hold off on additional IV iron supplementation. Encourage iron rich foods. Email ist of iron foods from up to date. Labs only check 3 months cbc iron ferritin. F/u 6 months with cbc iron ferritin 1-2 days prior.             Oncology    Thrombocytosis     H/o ITP now with thrombocytosis s/p splenectomy. Myeloproliferative workup negative. Most likely reactive to splenectomy. Continue to monitor. Continue ASA 81 mg PO daily. Consider repeat bone marrow biopsy if significant progression from baseline 500 range              Plan:     Menorrhagia with irregular cycle    History of ITP    Thrombocytosis            Med Onc Chart Routing      Follow up with physician    Follow up with JOSEPH 6 months and other.   Infusion scheduling note    Injection scheduling note    Labs CBC, ferritin and iron and TIBC   Scheduling:  Preferred lab:  Lab interval:  3 months  labs only cbc iron ferritin. 6 months cbc iron ferritin 1-2 days prior to visit   Imaging None      Pharmacy appointment No pharmacy appointment needed      Other referrals       No additional referrals needed         DEAN Morales-YOBANY

## 2024-10-01 NOTE — ASSESSMENT & PLAN NOTE
She notes some improvement in menorrhagia however still with irregular menstrual cycles. IUD placed about 3 months ago. Ongoing GYN follow up. Unable to tolerate PO iron for maintenance

## 2024-10-01 NOTE — TELEPHONE ENCOUNTER
"Primary provider: Dr. Mcdaniel    IBD medications: Stelara    Refill request for: Stelara    Allergies reviewed Yes    Drug Monitoring labs/frequency for all IBD meds:  CBC/CMP q6m, TB/Hep B annually    Lab Results   Component Value Date    HEPBSAG Non-reactive 09/06/2024    HEPBCAB Non-reactive 09/06/2024     Lab Results   Component Value Date    TBGOLDPLUS Negative 09/06/2024     No results found for: "QUANTNILVALU", "QUANTIFERON", "QUANTTBGDPL"   No results found for: "TSPOTSCREN"  Lab Results   Component Value Date    BQPVXVJJ78SP 32 04/18/2022    GQAOOJBZ29 337 09/06/2023     Lab Results   Component Value Date    WBC 8.40 09/27/2024    HGB 13.4 09/27/2024    HCT 41.2 09/27/2024    MCV 86 09/27/2024     (H) 09/27/2024     Lab Results   Component Value Date    CREATININE 0.8 09/27/2024    ALBUMIN 4.3 09/27/2024    BILITOT 0.3 09/27/2024    ALKPHOS 104 09/27/2024    AST 22 09/27/2024    ALT 24 09/27/2024       Lab due date (mo/yr):  03/2025    Labs scheduled: no - labs at [facility]    Next appt: 3/14/25    RX refill sent to provider for amount until next labs: Yes      "

## 2024-10-01 NOTE — ASSESSMENT & PLAN NOTE
S/p splenectomy having had failed several prior treatments. Now with thrombocytosis     Most recent CBC with stable thrombocytosis. Improved s/p prior iron infusion for iron deficiency. Previously intolerant to oral iron supplementation    Jak2, MPN, BCR-ABL all negative.     Of note prior bmbx 5/2022 5/31/22 for evaluation of thrombocytopenia. It showed a normocellular marrow (80% total cellularity) with no increased blasts and trilineage hematopoiesis with left-shifted granulopoiesis, minimal erythroid hyperplasia, and marked megakaryocytic hyperplasia without significant dysplasia     Stable thrombocytosis secondary to splenectomy. Continue ASA 81 mg PO daily. For now will hold off on additional IV iron supplementation. Encourage iron rich foods. Email ist of iron foods from up to date. Labs only check 3 months cbc iron ferritin. F/u 6 months with cbc iron ferritin 1-2 days prior.

## 2024-10-04 ENCOUNTER — PATIENT MESSAGE (OUTPATIENT)
Dept: PSYCHIATRY | Facility: CLINIC | Age: 19
End: 2024-10-04
Payer: COMMERCIAL

## 2024-10-14 ENCOUNTER — PATIENT MESSAGE (OUTPATIENT)
Dept: PSYCHIATRY | Facility: CLINIC | Age: 19
End: 2024-10-14
Payer: COMMERCIAL

## 2024-10-14 RX ORDER — QUETIAPINE FUMARATE 50 MG/1
50 TABLET, FILM COATED ORAL NIGHTLY
Qty: 30 TABLET | Refills: 2 | Status: SHIPPED | OUTPATIENT
Start: 2024-10-14 | End: 2025-10-14

## 2024-10-14 RX ORDER — FLUOXETINE HYDROCHLORIDE 40 MG/1
40 CAPSULE ORAL DAILY
Qty: 30 CAPSULE | Refills: 2 | Status: SHIPPED | OUTPATIENT
Start: 2024-10-14 | End: 2025-10-14

## 2024-10-16 ENCOUNTER — OFFICE VISIT (OUTPATIENT)
Dept: PSYCHIATRY | Facility: CLINIC | Age: 19
End: 2024-10-16
Payer: COMMERCIAL

## 2024-10-16 DIAGNOSIS — F19.982 DRUG-INDUCED INSOMNIA: ICD-10-CM

## 2024-10-16 DIAGNOSIS — F41.1 GAD (GENERALIZED ANXIETY DISORDER): Primary | ICD-10-CM

## 2024-10-16 PROCEDURE — 1160F RVW MEDS BY RX/DR IN RCRD: CPT | Mod: CPTII,95,, | Performed by: NURSE PRACTITIONER

## 2024-10-16 PROCEDURE — 1159F MED LIST DOCD IN RCRD: CPT | Mod: CPTII,95,, | Performed by: NURSE PRACTITIONER

## 2024-10-16 PROCEDURE — 99214 OFFICE O/P EST MOD 30 MIN: CPT | Mod: 95,,, | Performed by: NURSE PRACTITIONER

## 2024-10-16 PROCEDURE — 90833 PSYTX W PT W E/M 30 MIN: CPT | Mod: 95,,, | Performed by: NURSE PRACTITIONER

## 2024-10-16 NOTE — PROGRESS NOTES
"Outpatient Psychiatry Follow-Up Visit    Clinical Status of Patient: Outpatient (Virtual))  10/16/2024    [address] [place] [address]  [phone] (M)  [phone] (H)  Visit type: Virtual visit with synchronous audio and video  Each patient to whom he or she provides medical services by telemedicine is:  (1) informed of the relationship between the physician and patient and the respective role of any other health care provider with respect to management of the patient; and (2) notified that he or she may decline to receive medical services by telemedicine and may withdraw from such care at any time.    Chief Complaint: Pt is a 19 yo F who presents today for a follow-up. Met with patient.       Interval History and Content of Current Session:  Interim Events/Subjective Report/Content of Current Session:  follow up appointment.    Pt is a 20 yo F with past psychiatric hx of "illness anxiety" who presents for follow up treatment. She is currently taking Seroquel 50mg QHS (insomnia), Klonopin 0.5mg, Prozac 40mg QD, Prazosin 2mg QHS (nightmares). Symptoms have been mostly treatment resistant since establishing care here. Symptoms like poor focus and chronic fatigue likely r/t chronic ITP/thrombocytosis/iron deficiency. Tried wellbutrin last visit but there were no appreciable benefits. She does meet criteria for ADD but again, symptoms could be r/t iron deficiencies. Her anxiety is fine. She is not sleeping well however. She is interested in an ADD eval in order to consider stimulants.     Past Psychiatric hx: Pt. is a 19 yo F with a past psychiatric hx of "illness anxiety disorder" and "mdd" presenting for initial eval and med mgmt. PT presented to me taking Zoloft 75mg QD (has been taking for around 1.5 years ago through pediatric hematologist) and denies any past med trials.  Denies hx of inpatient psych, denies past suicidal behaviors.     Pt reports suffering from chronic ITP since 7th grade and has " "undergone significant treatments for this. Pt notes that she recently had her spleen removed. Pt notes she was initially prescribed zoloft at age 17 secondary to anxiety/stress secondary to her medical diagnoses. This did have a positive impact on her mood and anxiety levels. She also notes being treated with high dose steroids over the course of a year which caused many setbacks in regards to her mental health. In addition, pt notes undergoing a spinal fusion at age 15 which was followed by a year long recovery. She d/c her Zoloft and noticed increasing depression and anxiety. Pt acknowledges that the majority of her symptoms are secondary to her extensive medical history.      Lately, she notes significant nighttime anxiety, restlessness, tension that interferes with her ability to fall and stay asleep. "I feel a huge sense of panic". Notes daily generalized, ruminative worry. Sleep is poor. "I just need to sleep. I am so exhausted. I am so tired all day."      Past Medical hx:   Past Medical History:   Diagnosis Date    Chronic constipation     Chronic ITP (idiopathic thrombocytopenia)     Crohn's disease (ileum)     GERD (gastroesophageal reflux disease)     Inflammatory bowel disease     Iron deficiency anemia due to chronic blood loss 08/20/2018    Last Assessment & Plan:   Secondary to prolonged menstrual bleeding.  Continue ferrous sulfate 650mg PO BID.   Discussed other OCP options with Gyn.      Long-term current use of intravenous immunoglobulin (IVIG)     PONV (postoperative nausea and vomiting)         Interim hx:    Denies suicidal/homicidal ideations.  Denies hopelessness/worthlessness.    Denies auditory/visual hallucinations      Susbtance use: denies      Review of Systems   PSYCHIATRIC: Pertinent items are noted in the narrative.        CONSTITUTIONAL: weight stable        M/S: no pain today         ENT: no allergies noted today        ABD: no n/v/d     Past Medical, Family and Social History: The " "patient's past medical, family and social history have been reviewed and updated as appropriate within the electronic medical record. See encounter notes.       Compliance: yes           Risk Parameters:  Patient reports no suicidal ideation  Patient reports no homicidal ideation  Patient reports no self-injurious behavior  Patient reports no violent behavior     Exam (detailed: at least 9 elements; comprehensive: all 15 elements)   Constitutional  Vitals:  Most recent vital signs, dated less than 90 days prior to this appointment, were reviewed.       General:  unremarkable, age appropriate, casual attire, good eye contact, good rapport       Musculoskeletal  Muscle Strength/Tone:  no flaccidity, no tremor    Gait & Station:  normal      Psychiatric                       Speech:  normal tone, normal rate, rhythm, and volume   Mood & Affect:   Euthymic, congruent, appropriate         Thought Process:   Goal directed; Linear    Associations:   intact   Thought Content:   No SI/HI, delusions, or paranoia, no AV/VH   Insight & Judgement:   Good, adequate to circumstances   Orientation:   grossly intact; alert and oriented x 4    Memory:  intact for content of interview    Language:  grossly intact, can repeat    Attention Span  : Grossly intact for content of interview   Fund of Knowledge:   intact and appropriate to age and level of education        Assessment and Diagnosis   Status/Progress: Based on the examination today, the patient's problem(s) is/are under fair control.  New problems have not been presented today. Comorbidities are not currently complicating management of the primary condition.      Impression:      Pt is a 18 yo F with past psychiatric hx of "illness anxiety" who presents for follow up treatment. She is currently taking Seroquel 50mg QHS (insomnia), Klonopin 0.5mg, Prozac 40mg QD, Prazosin 2mg QHS (nightmares). Symptoms have been mostly treatment resistant since establishing care here. Symptoms " like poor focus and chronic fatigue likely r/t chronic ITP/thrombocytosis/iron deficiency. Tried wellbutrin last visit but there were no appreciable benefits. She does meet criteria for ADD but again, symptoms could be r/t iron deficiencies. Her anxiety is fine. She is not sleeping well however. She is interested in an ADD eval in order to consider stimulants.     Diagnosis: VELASQUEZ    Intervention/Counseling/Treatment Plan   Medication Management:      Cont Prozac 40mg QD. Discussed potential for GI side effects, sexual dysfunction, mood destabilization, headaches    D/c wellbutrin xl    2. Cont Seroquel 50mg qhs. Typical MILAGROS's reviewed including weight gain, abnormal movements, EPS, TD, metabolic side effects.     3. Cont Klonopin 0.5mg QHS for sleep/anxiety. Discussed risk of decreased RT, sedation, addictive potential, and not to mix with alcohol.      4. Call to report any worsening of symptoms or problems with the medication. Pt instructed to go to ER with thoughts of harming self, others     5. Patient given contact # for psychotherapists at [facility] and also instructed she may check with insurance for list of providers.      6. Labs: no new orders      Return to clinic: 4-6 weeks - self schedule    Psychotherapy:   Target symptoms: inattention/distractibility, anxiety    Why chosen therapy is appropriate versus another modality: relevant to diagnosis, patient responds to this modality  Outcome monitoring methods: self-report, observation, feedback from family   Therapeutic intervention type: supportive psychotherapy  Topics discussed/themes: building skills sets for symptom management, symptom recognition, nutrition, exercise  The patient's response to the intervention is accepting. The patient's progress toward treatment goals is positive progress.  Duration of intervention: 20 minutes     -Spent 30min face to face with the pt; >50% time spent in counseling   -Supportive therapy and psychoeducation  provided  -R/B/SE's of medications discussed with the pt who expresses understanding and chooses to take medications as prescribed.   -Pt instructed to call clinic, 911 or go to nearest emergency room if sxs worsen or pt is in   crisis. The pt expresses understanding.    Carlos Durán, NP

## 2024-10-21 ENCOUNTER — PATIENT MESSAGE (OUTPATIENT)
Dept: PSYCHIATRY | Facility: CLINIC | Age: 19
End: 2024-10-21
Payer: COMMERCIAL

## 2024-10-21 DIAGNOSIS — F41.1 GAD (GENERALIZED ANXIETY DISORDER): Primary | ICD-10-CM

## 2024-10-23 ENCOUNTER — PATIENT MESSAGE (OUTPATIENT)
Dept: PSYCHIATRY | Facility: CLINIC | Age: 19
End: 2024-10-23
Payer: COMMERCIAL

## 2024-10-25 RX ORDER — BUPROPION HYDROCHLORIDE 300 MG/1
300 TABLET ORAL DAILY
Qty: 30 TABLET | Refills: 2 | Status: SHIPPED | OUTPATIENT
Start: 2024-10-25 | End: 2025-10-25

## 2024-10-25 RX ORDER — CLONAZEPAM 0.5 MG/1
0.5 TABLET ORAL DAILY PRN
Qty: 30 TABLET | Refills: 0 | Status: SHIPPED | OUTPATIENT
Start: 2024-10-25 | End: 2025-10-25

## 2024-10-25 NOTE — TELEPHONE ENCOUNTER
Wellbutrin -     Last ordered: 2 months ago (8/14/2024) by Carlos Durán NP       Klonopin - Last ordered: 4 weeks ago (9/27/2024) by Carlos Durán NP     Nov none   Lov 10/16/24

## 2024-10-31 ENCOUNTER — PATIENT MESSAGE (OUTPATIENT)
Dept: PSYCHIATRY | Facility: CLINIC | Age: 19
End: 2024-10-31
Payer: COMMERCIAL

## 2024-11-08 ENCOUNTER — OFFICE VISIT (OUTPATIENT)
Dept: PSYCHIATRY | Facility: CLINIC | Age: 19
End: 2024-11-08
Payer: COMMERCIAL

## 2024-11-08 ENCOUNTER — PATIENT MESSAGE (OUTPATIENT)
Dept: PSYCHIATRY | Facility: CLINIC | Age: 19
End: 2024-11-08
Payer: COMMERCIAL

## 2024-11-08 DIAGNOSIS — Z13.39 ADHD (ATTENTION DEFICIT HYPERACTIVITY DISORDER) EVALUATION: Primary | ICD-10-CM

## 2024-11-08 DIAGNOSIS — F41.1 GAD (GENERALIZED ANXIETY DISORDER): ICD-10-CM

## 2024-11-08 NOTE — PROGRESS NOTES
The patient location is: [place], [place]  The chief complaint leading to consultation is: ADHD evaluation  Visit type: Virtual visit with synchronous audio and video  Each patient to whom he or she provides medical services by telemedicine is:  (1) informed of the relationship between the physician and patient and the respective role of any other health care provider with respect to management of the patient; and (2) notified that he or she may decline to receive medical services by telemedicine and may withdraw from such care at any time.  Face-to-Face time: 31 minutes    Notes:      Outpatient Psychological Consultation    Name: Mini Sarmiento  MRN: [id number]  : 2005    Testing appointment: 10/31/2024    ID:  Patient presents for consultation for diagnostic clarity in regard to difficulties with attention/concentration    Reason for encounter Referral from Carlos Durán NP     Psychiatric records were reviewed prior to the diagnostic interview. Comprehensive psychological assessment will not be documented in this report rather will be focused on the referral question. See prior notes for comprehensive psychiatric work-up.    Chief Complaint: Pt is a 19 year old female presenting as a referral from Carlos Durán NP for diagnostic clarification due to report of difficulty concentration/poor attention    Psychological Assessments Administered  Wender [place] Rating Scale for the Attention Deficit Hyperactivity Disorder  Yasmin Adult ADHD Rating Scales-IV: Other-Report, current symptoms  Yasmin Adult ADHD Rating Scales-IV: Other-Report, childhood symptoms  Courtney Continuous Performance Test 3  The Dot Counting Test    Results    Wender [place] Rating Scale for the Attention Deficit Hyperactivity Disorder  The Wender [place] Rating Scale can be used to assess adults for Attention Deficit Hyperactivity Disorder with a subset of 25 questions associated with that diagnosis. It is a retrospective  diagnosis of childhood ADHD.      Wender [place] Rating Scale Subscore = 62 (sum of the 25 questions endorsed that are associated with ADHD)    Scores vary from 1-100, and the cutoff score is 46.    Given pt's report of their own symptoms of ADHD in childhood, their response style does support a diagnosis of ADHD.    Yasmin Adult ADHD Rating Scales-IV: Other-Report, current symptoms  The BAARS-IV: Other-Report, current symptoms is a collateral measure of the patient's current symptoms of ADHD, as noted by someone with knowledge of the patient's executive functioning.    Stuart Marcus is the evaluator whose relationship to pt is her sibling.     Inattention total score: 19      Hyperactivity total score: 10      Impulsivity total score: 11      Sluggish Cognitive Tempo total score: 28    ADHD total score (sum of Inattention/Hyperactivity/Impulsivity Subsections): 40       Inattention Symptom Count: 3     Hyperactivity/Impulsivity Symptom Count: 3   ADHD Symptom Count total (sum of Inattention/Hyperactivity/Impulsivity Subsections): 6     Based on the evaluator's report of pt's symptoms, pt only struggles with 3 symptoms of inattention but 3 symptoms of hyperactivity/impulsivity. This does not support a diagnosis of ADHD.    Yasmin Adult ADHD Rating Scales-IV: Other-Report, childhood symptoms  The BAARS-IV: Other-Report, childhood symptoms is a collateral measure of the patient's symptoms of ADHD between the ages of 5-12 years old, as reported by someone with knowledge of the patient's symptoms during childhood.    Reta Marcus is the evaluator whose relationship to pt is her mother.     Inattention total score: 13   Hyperactivity/Impulsivity total score: 10     Inattention Symptom Count: 1   Hyperactivity/Impulsivity Symptom Count: 0     Based on the evaluator's report of symptoms, pt did not exhibit sufficient symptoms of inattention, hyperactivity, and impulsivity that affected multiple settings at an early age  to meet the DSM-5 diagnosis criteria for an ADHD diagnosis.     Courtney Continuous Performance Test 3  The Courtney Continuous Performance Test 3rd Edition (Courtney CPT 3) is an objective, task-oriented computerized assessment of attention-related problems. This measure creates an index of the respondent's performance in areas of inattentiveness, impulsivity, sustained attention, and vigilance.    Pt's performance on this objective measure does not indicate difficulties with sustained attention, hyperactivity, impulsivity, and difficulty maintaining vigilance with varying levels of stimulus frequency.    The Dot Counting Test  The Dot Counting Test (DCT) is a brief task that assesses test-taking effort in individuals.     Based on patient performance and score, pt's effort is suspect    Interpretation    Pt is a 19 year old female presenting as a referral from Carlos Durán NP for diagnostic clarification due to report of difficulty concentration/poor attention. Pt reports attention/concentration concerns, however, based on the totality of the self-reported symptoms, collateral information, and objective testing, pt does not appear to meet criteria for an ADHD diagnosis.    Diagnosis     None    Plan and Recommendations    Return for further psychiatric medication management with Ortiz Durán NP    Attention Tips Remember that inattention and lack of focus are major culprits to forgetting information so be sure and practice paying attention for adequate learning of information. If you rely on passive attention to remembering something (e.g., yeah, titi-huh approach), you'll find you cannot recall it later. I recommend the following to improve attention, which may aid in later recall:   Reduce distractions as much as possible.  Look at the person as they are speaking to you.   Paraphrase as they are speaking  Write down important pieces of information   Ask people to repeat if you zone out.    Have visual cues   (posted to-do-list, daily schedule) to remind you if you need to do something later.   Processing Speed Tips Using multiple modalities (e.g., listening, writing notes, asking questions, recording) to learn new information is likely to allow additional time for processing, thus improving memory for the material.   Allowing sufficient time to complete tasks will reduce frustration and help to ensure completion.  Spend a lot of up-front time planning in advance how long a task may take and then chunk steps in the task so you don't wear yourself out.   Executive Functioning Tips: Don't attempt to multi-task.  Separate tasks so that each can be completed one at a time.  Consider using a calendar/day planner, as that may be effective to help you plan and stay on track.  Color-coding specific tasks by importance may add additional benefit to your planner.  Break down large projects into smaller tasks and write down the steps to completing the task.       BILLIN, 96139 X2 (90 minutes of testing and scoring with psychometrist), [id number], [id number] (2 hours of report writing, evaluation and feedback)

## 2024-11-11 ENCOUNTER — PATIENT MESSAGE (OUTPATIENT)
Dept: GASTROENTEROLOGY | Facility: CLINIC | Age: 19
End: 2024-11-11
Payer: COMMERCIAL

## 2024-11-11 DIAGNOSIS — K50.00 CROHN'S DISEASE OF SMALL INTESTINE WITHOUT COMPLICATION: Primary | ICD-10-CM

## 2024-11-11 RX ORDER — DICYCLOMINE HYDROCHLORIDE 10 MG/1
10 CAPSULE ORAL
Qty: 120 CAPSULE | Refills: 0 | Status: SHIPPED | OUTPATIENT
Start: 2024-11-11 | End: 2024-12-11

## 2024-11-11 NOTE — TELEPHONE ENCOUNTER
Spoke with Mini:  - reports stools are hard, she has to strain to get them out, and never feels as if she's emptied completely  - she will use Dulcolax if she gets to the 4 day clotilde with no BM, still doesn't feel emptied  - has used Miralax in the past  - enc her to resume Miralax daily with the goal of soft formed stool that moves out easily  - adv to adjust the dose to achieve the goal and to back off if she develops too soft of stool or diarrhea  - she would like a refill of Bentyl not only for her abdominal cramping but she feels this helps her menstrual cramps  - reviewed Bentyl is an anticholinergic and this can lead to constipation issues  - stressed the importance of getting control of the constipation as this will likely reduce her abdominal pain and cramping which would lead to less need for Bentyl  - she had the Kyleena IUD placed in July 2024 for heavy menstrual cycles that were unpredictable.  - she continues to have heavy bleeding regularly but feels this may be getting less  - she continues to have significant menstrual cramps to the point of calling in sick to work  - advised she contact her OB/GYN to discuss these issues   - she states understanding and agrees to this plan

## 2024-11-11 NOTE — TELEPHONE ENCOUNTER
IBD Provider: Dr. Mcdaniel     - Allergies reviewed  - Rx pended for approval    - Colonoscopy: 12/17/24  - OV: 3/14/24

## 2024-11-14 RX ORDER — QUETIAPINE FUMARATE 50 MG/1
50 TABLET, FILM COATED ORAL NIGHTLY
Qty: 30 TABLET | Refills: 2 | Status: SHIPPED | OUTPATIENT
Start: 2024-11-14 | End: 2025-11-14

## 2024-11-15 ENCOUNTER — TELEPHONE (OUTPATIENT)
Dept: PSYCHIATRY | Facility: CLINIC | Age: 19
End: 2024-11-15
Payer: COMMERCIAL

## 2024-11-15 ENCOUNTER — PATIENT MESSAGE (OUTPATIENT)
Dept: PSYCHIATRY | Facility: CLINIC | Age: 19
End: 2024-11-15
Payer: COMMERCIAL

## 2024-11-15 ENCOUNTER — OFFICE VISIT (OUTPATIENT)
Dept: PSYCHIATRY | Facility: CLINIC | Age: 19
End: 2024-11-15
Payer: COMMERCIAL

## 2024-11-15 DIAGNOSIS — F41.1 GAD (GENERALIZED ANXIETY DISORDER): Primary | ICD-10-CM

## 2024-11-15 DIAGNOSIS — Z13.39 ADHD (ATTENTION DEFICIT HYPERACTIVITY DISORDER) EVALUATION: ICD-10-CM

## 2024-11-15 RX ORDER — ATOMOXETINE 40 MG/1
40 CAPSULE ORAL DAILY
Qty: 30 CAPSULE | Refills: 1 | Status: SHIPPED | OUTPATIENT
Start: 2024-11-15 | End: 2024-12-15

## 2024-11-15 NOTE — PROGRESS NOTES
"Outpatient Psychiatry Follow-Up Visit    Clinical Status of Patient: Outpatient (Virtual))  11/15/2024    [id number] [place] [address]  [phone] (M)  [phone] (H)  Visit type: Virtual visit with synchronous audio and video  Each patient to whom he or she provides medical services by telemedicine is:  (1) informed of the relationship between the physician and patient and the respective role of any other health care provider with respect to management of the patient; and (2) notified that he or she may decline to receive medical services by telemedicine and may withdraw from such care at any time.    Chief Complaint: Pt is a 19 yo F who presents today for a follow-up. Met with patient.       Interval History and Content of Current Session:  Interim Events/Subjective Report/Content of Current Session:  follow up appointment.    Pt is a 18 yo F with past psychiatric hx of "illness anxiety" who presents for follow up treatment. She is currently taking Seroquel 50mg QHS (insomnia), Klonopin 0.5mg, Prozac 40mg QD, Prazosin 2mg QHS (nightmares). Symptoms have been mostly treatment resistant since establishing care here. Symptoms like poor focus and chronic fatigue likely r/t chronic ITP/thrombocytosis/iron deficiency. Tried wellbutrin last visit but there were no appreciable benefits. She did not meet criteria for ADD after psychological evaluation last week, but is frustrated at this. Between sessions, pt reports a progressively worsening level of focus. They note increases in distractibility and have a tendency to procrastinate. Overall level of functioning has suffered as a result. There is a reduced ability to accomplish tasks and meet deadlines.    She is willing to pay out of pocket for stimulants and I do feel she would benefit. Trial of Strattera has been ordered.        Past Psychiatric hx: Pt. is a 19 yo F with a past psychiatric hx of "illness anxiety disorder" and "mdd" presenting for initial " "eval and med mgmt. PT presented to me taking Zoloft 75mg QD (has been taking for around 1.5 years ago through pediatric hematologist) and denies any past med trials.  Denies hx of inpatient psych, denies past suicidal behaviors.     Pt reports suffering from chronic ITP since 7th grade and has undergone significant treatments for this. Pt notes that she recently had her spleen removed. Pt notes she was initially prescribed zoloft at age 17 secondary to anxiety/stress secondary to her medical diagnoses. This did have a positive impact on her mood and anxiety levels. She also notes being treated with high dose steroids over the course of a year which caused many setbacks in regards to her mental health. In addition, pt notes undergoing a spinal fusion at age 15 which was followed by a year long recovery. She d/c her Zoloft and noticed increasing depression and anxiety. Pt acknowledges that the majority of her symptoms are secondary to her extensive medical history.      Lately, she notes significant nighttime anxiety, restlessness, tension that interferes with her ability to fall and stay asleep. "I feel a huge sense of panic". Notes daily generalized, ruminative worry. Sleep is poor. "I just need to sleep. I am so exhausted. I am so tired all day."      Past Medical hx:   Past Medical History:   Diagnosis Date    Chronic constipation     Chronic ITP (idiopathic thrombocytopenia)     Crohn's disease (ileum)     GERD (gastroesophageal reflux disease)     Inflammatory bowel disease     Iron deficiency anemia due to chronic blood loss 08/20/2018    Last Assessment & Plan:   Secondary to prolonged menstrual bleeding.  Continue ferrous sulfate 650mg PO BID.   Discussed other OCP options with Gyn.      Long-term current use of intravenous immunoglobulin (IVIG)     PONV (postoperative nausea and vomiting)         Interim hx:    Denies suicidal/homicidal ideations.  Denies hopelessness/worthlessness.    Denies auditory/visual " hallucinations      Susbtance use: denies      Review of Systems   PSYCHIATRIC: Pertinent items are noted in the narrative.        CONSTITUTIONAL: weight stable        M/S: no pain today         ENT: no allergies noted today        ABD: no n/v/d     Past Medical, Family and Social History: The patient's past medical, family and social history have been reviewed and updated as appropriate within the electronic medical record. See encounter notes.       Compliance: yes           Risk Parameters:  Patient reports no suicidal ideation  Patient reports no homicidal ideation  Patient reports no self-injurious behavior  Patient reports no violent behavior     Exam (detailed: at least 9 elements; comprehensive: all 15 elements)   Constitutional  Vitals:  Most recent vital signs, dated less than 90 days prior to this appointment, were reviewed.       General:  unremarkable, age appropriate, casual attire, good eye contact, good rapport       Musculoskeletal  Muscle Strength/Tone:  no flaccidity, no tremor    Gait & Station:  normal      Psychiatric                       Speech:  normal tone, normal rate, rhythm, and volume   Mood & Affect:   Euthymic, congruent, appropriate         Thought Process:   Goal directed; Linear    Associations:   intact   Thought Content:   No SI/HI, delusions, or paranoia, no AV/VH   Insight & Judgement:   Good, adequate to circumstances   Orientation:   grossly intact; alert and oriented x 4    Memory:  intact for content of interview    Language:  grossly intact, can repeat    Attention Span  : Grossly intact for content of interview   Fund of Knowledge:   intact and appropriate to age and level of education        Assessment and Diagnosis   Status/Progress: Based on the examination today, the patient's problem(s) is/are under fair control.  New problems have not been presented today. Comorbidities are not currently complicating management of the primary condition.      Impression:    Pt is a  "18 yo F with past psychiatric hx of "illness anxiety" who presents for follow up treatment. She is currently taking Seroquel 50mg QHS (insomnia), Klonopin 0.5mg, Prozac 40mg QD, Prazosin 2mg QHS (nightmares). Symptoms have been mostly treatment resistant since establishing care here. Symptoms like poor focus and chronic fatigue likely r/t chronic ITP/thrombocytosis/iron deficiency. Tried wellbutrin last visit but there were no appreciable benefits. She did not meet criteria for ADD after psychological evaluation last week, but is frustrated at this. Between sessions, pt reports a progressively worsening level of focus. They note increases in distractibility and have a tendency to procrastinate. Overall level of functioning has suffered as a result. There is a reduced ability to accomplish tasks and meet deadlines.     She is willing to pay out of pocket for stimulants and I do feel she would benefit. Trial of Strattera has been ordered.       Diagnosis: VELASQUEZ    Intervention/Counseling/Treatment Plan   Medication Management:      Cont Prozac 40mg QD. Discussed potential for GI side effects, sexual dysfunction, mood destabilization, headaches    2. Start Strattera 40mg QD for focus    3. Cont Seroquel 50mg qhs. Typical MILAGROS's reviewed including weight gain, abnormal movements, EPS, TD, metabolic side effects.     3. Cont Klonopin 0.5mg QHS for sleep/anxiety. Discussed risk of decreased RT, sedation, addictive potential, and not to mix with alcohol.      4. Call to report any worsening of symptoms or problems with the medication. Pt instructed to go to ER with thoughts of harming self, others     5. Patient given contact # for psychotherapists at [facility] and also instructed she may check with insurance for list of providers.      6. Labs: no new orders      Return to clinic: 4-6 weeks - self schedule    Psychotherapy:   Target symptoms: inattention/distractibility, anxiety    Why chosen therapy is appropriate " versus another modality: relevant to diagnosis, patient responds to this modality  Outcome monitoring methods: self-report, observation, feedback from family   Therapeutic intervention type: supportive psychotherapy  Topics discussed/themes: building skills sets for symptom management, symptom recognition, nutrition, exercise  The patient's response to the intervention is accepting. The patient's progress toward treatment goals is positive progress.  Duration of intervention: 20 minutes     -Spent 30min face to face with the pt; >50% time spent in counseling   -Supportive therapy and psychoeducation provided  -R/B/SE's of medications discussed with the pt who expresses understanding and chooses to take medications as prescribed.   -Pt instructed to call clinic, 911 or go to nearest emergency room if sxs worsen or pt is in   crisis. The pt expresses understanding.    Carlos Durán, NP

## 2024-11-20 DIAGNOSIS — F41.1 GAD (GENERALIZED ANXIETY DISORDER): Primary | ICD-10-CM

## 2024-11-21 RX ORDER — CLONAZEPAM 0.5 MG/1
0.5 TABLET ORAL DAILY PRN
Qty: 30 TABLET | Refills: 0 | Status: SHIPPED | OUTPATIENT
Start: 2024-11-21 | End: 2025-11-21

## 2024-11-22 ENCOUNTER — TELEPHONE (OUTPATIENT)
Dept: PSYCHIATRY | Facility: CLINIC | Age: 19
End: 2024-11-22
Payer: COMMERCIAL

## 2024-11-22 NOTE — TELEPHONE ENCOUNTER
The Atomoxetine that you prescribed the patient, I put in a PA for and it was denied because the plan will only cover the drug when an assessment shows that the patient has ADHD or ADD. I sent in the last clinic note you had on her, is there anything you want me to send in additionally so that I can appeal the denial or do you possibly want to try a new medication?

## 2024-11-22 NOTE — TELEPHONE ENCOUNTER
Please cancel Strattera from [facility] and resend to [facility]. Since PA was denied I helped patient generate a savings card via [employer], cost out of pocket 26.64$    Patient would like to know if she should start medication as soon as it is filled.  Please advice.  Diana

## 2024-11-25 ENCOUNTER — PATIENT MESSAGE (OUTPATIENT)
Dept: ADMINISTRATIVE | Facility: OTHER | Age: 19
End: 2024-11-25
Payer: COMMERCIAL

## 2024-11-25 RX ORDER — ATOMOXETINE 40 MG/1
40 CAPSULE ORAL DAILY
Qty: 30 CAPSULE | Refills: 1 | Status: SHIPPED | OUTPATIENT
Start: 2024-11-25 | End: 2024-12-25

## 2024-12-10 ENCOUNTER — PATIENT MESSAGE (OUTPATIENT)
Dept: ENDOSCOPY | Facility: HOSPITAL | Age: 19
End: 2024-12-10
Payer: COMMERCIAL

## 2024-12-10 ENCOUNTER — PATIENT MESSAGE (OUTPATIENT)
Dept: HEMATOLOGY/ONCOLOGY | Facility: CLINIC | Age: 19
End: 2024-12-10
Payer: COMMERCIAL

## 2024-12-10 DIAGNOSIS — Z12.11 COLON CANCER SCREENING: Primary | ICD-10-CM

## 2024-12-10 RX ORDER — SODIUM, POTASSIUM,MAG SULFATES 17.5-3.13G
1 SOLUTION, RECONSTITUTED, ORAL ORAL DAILY
Qty: 1 KIT | Refills: 0 | Status: SHIPPED | OUTPATIENT
Start: 2024-12-10 | End: 2024-12-12

## 2024-12-17 ENCOUNTER — DOCUMENTATION ONLY (OUTPATIENT)
Dept: GASTROENTEROLOGY | Facility: CLINIC | Age: 19
End: 2024-12-17
Payer: COMMERCIAL

## 2024-12-17 ENCOUNTER — ANESTHESIA (OUTPATIENT)
Dept: ENDOSCOPY | Facility: HOSPITAL | Age: 19
End: 2024-12-17
Payer: COMMERCIAL

## 2024-12-17 ENCOUNTER — ANESTHESIA EVENT (OUTPATIENT)
Dept: ENDOSCOPY | Facility: HOSPITAL | Age: 19
End: 2024-12-17
Payer: COMMERCIAL

## 2024-12-17 ENCOUNTER — HOSPITAL ENCOUNTER (OUTPATIENT)
Facility: HOSPITAL | Age: 19
Discharge: HOME | End: 2024-12-17
Attending: INTERNAL MEDICINE | Admitting: INTERNAL MEDICINE
Payer: COMMERCIAL

## 2024-12-17 VITALS
OXYGEN SATURATION: 100 % | TEMPERATURE: 98 F | BODY MASS INDEX: 24.91 KG/M2 | SYSTOLIC BLOOD PRESSURE: 105 MMHG | HEIGHT: 66 IN | WEIGHT: 155 LBS | DIASTOLIC BLOOD PRESSURE: 56 MMHG | RESPIRATION RATE: 18 BRPM | HEART RATE: 79 BPM

## 2024-12-17 DIAGNOSIS — K50.00 CROHN'S DISEASE INVOLVING TERMINAL ILEUM: ICD-10-CM

## 2024-12-17 DIAGNOSIS — K50.00 CROHN'S DISEASE OF SMALL INTESTINE WITHOUT COMPLICATION: Primary | ICD-10-CM

## 2024-12-17 LAB
B-HCG UR QL: NEGATIVE
CTP QC/QA: YES

## 2024-12-17 PROCEDURE — 88305 TISSUE EXAM BY PATHOLOGIST: CPT | Performed by: PATHOLOGY

## 2024-12-17 PROCEDURE — 63600175 PHARM REV CODE 636 W HCPCS: Performed by: STUDENT IN AN ORGANIZED HEALTH CARE EDUCATION/TRAINING PROGRAM

## 2024-12-17 PROCEDURE — 37000008 HC ANESTHESIA 1ST 15 MINUTES: Performed by: INTERNAL MEDICINE

## 2024-12-17 PROCEDURE — 63600175 PHARM REV CODE 636 W HCPCS: Performed by: NURSE ANESTHETIST, CERTIFIED REGISTERED

## 2024-12-17 PROCEDURE — 37000009 HC ANESTHESIA EA ADD 15 MINS: Performed by: INTERNAL MEDICINE

## 2024-12-17 PROCEDURE — 81025 URINE PREGNANCY TEST: CPT | Performed by: INTERNAL MEDICINE

## 2024-12-17 PROCEDURE — 27201012 HC FORCEPS, HOT/COLD, DISP: Performed by: INTERNAL MEDICINE

## 2024-12-17 PROCEDURE — 45380 COLONOSCOPY AND BIOPSY: CPT | Mod: ,,, | Performed by: INTERNAL MEDICINE

## 2024-12-17 PROCEDURE — 45380 COLONOSCOPY AND BIOPSY: CPT | Performed by: INTERNAL MEDICINE

## 2024-12-17 RX ORDER — SODIUM CHLORIDE 9 MG/ML
INJECTION, SOLUTION INTRAVENOUS CONTINUOUS
Status: CANCELLED | OUTPATIENT
Start: 2024-12-17

## 2024-12-17 RX ORDER — SODIUM CHLORIDE 9 MG/ML
INJECTION, SOLUTION INTRAVENOUS CONTINUOUS
Status: DISCONTINUED | OUTPATIENT
Start: 2024-12-17 | End: 2024-12-17 | Stop reason: HOSPADM

## 2024-12-17 RX ORDER — PROPOFOL 10 MG/ML
VIAL (ML) INTRAVENOUS
Status: DISCONTINUED | OUTPATIENT
Start: 2024-12-17 | End: 2024-12-17

## 2024-12-17 RX ORDER — PROPOFOL 10 MG/ML
VIAL (ML) INTRAVENOUS CONTINUOUS PRN
Status: DISCONTINUED | OUTPATIENT
Start: 2024-12-17 | End: 2024-12-17

## 2024-12-17 RX ORDER — FENTANYL CITRATE 50 UG/ML
25 INJECTION, SOLUTION INTRAMUSCULAR; INTRAVENOUS EVERY 5 MIN PRN
Status: DISCONTINUED | OUTPATIENT
Start: 2024-12-17 | End: 2024-12-17 | Stop reason: HOSPADM

## 2024-12-17 RX ORDER — LIDOCAINE HYDROCHLORIDE 20 MG/ML
INJECTION INTRAVENOUS
Status: DISCONTINUED | OUTPATIENT
Start: 2024-12-17 | End: 2024-12-17

## 2024-12-17 RX ADMIN — FENTANYL CITRATE 25 MCG: 50 INJECTION INTRAMUSCULAR; INTRAVENOUS at 03:12

## 2024-12-17 RX ADMIN — LIDOCAINE HYDROCHLORIDE 30 MG: 20 INJECTION INTRAVENOUS at 01:12

## 2024-12-17 RX ADMIN — PROPOFOL 225 MCG/KG/MIN: 10 INJECTION, EMULSION INTRAVENOUS at 01:12

## 2024-12-17 RX ADMIN — PROPOFOL 30 MG: 10 INJECTION, EMULSION INTRAVENOUS at 01:12

## 2024-12-17 RX ADMIN — PROPOFOL 70 MG: 10 INJECTION, EMULSION INTRAVENOUS at 01:12

## 2024-12-17 NOTE — PLAN OF CARE
Patient feels well enough for discharge with abdominal pain being very mild. Ok for d/c per Dr. Mcdaniel and Dr. Schaefer. Reviewed discharge instructions and Provation report with patient and father. Instructed that if pain persists and/or worsens in nature to report to local ED. Both verbalized understanding.

## 2024-12-17 NOTE — TRANSFER OF CARE
"Anesthesia Transfer of Care Note    Patient: Mini Marcus    Procedure(s) Performed: Procedure(s) (LRB):  COLONOSCOPY (N/A)    Patient location: PACU    Anesthesia Type: general    Transport from OR: Transported from OR on room air with adequate spontaneous ventilation    Post pain: adequate analgesia    Post assessment: no apparent anesthetic complications and tolerated procedure well    Post vital signs: stable    Level of consciousness: awake, alert and oriented    Nausea/Vomiting: no nausea/vomiting    Complications: none    Transfer of care protocol was followed      Last vitals: Visit Vitals  /63 (BP Location: Left arm, Patient Position: Lying)   Pulse 96   Temp 36.8 °C (98.2 °F) (Oral)   Resp 16   Ht 5' 6" (1.676 m)   Wt 70.3 kg (155 lb)   SpO2 99%   Breastfeeding No   BMI 25.02 kg/m²     "

## 2024-12-17 NOTE — H&P
Short Stay Endoscopy History and Physical    PCP - Annie Santiago MD  Referring Physician - Bertram Mcdaniel MD  [address] [place] [address]    Procedure - Colonoscopy  ASA - per anesthesia  Mallampati - per anesthesia  History of Anesthesia problems - no  Family history Anesthesia problems -  no   Plan of anesthesia - General    HPI  19 y.o. female  Reason for procedure: Crohn's f/u      ROS:  Constitutional: No fevers, chills, No weight loss  CV: No chest pain  Pulm: No cough, No shortness of breath  GI: see HPI    Medical History:  has a past medical history of Chronic constipation, Chronic ITP (idiopathic thrombocytopenia), Crohn's disease (ileum), GERD (gastroesophageal reflux disease), Inflammatory bowel disease, Iron deficiency anemia due to chronic blood loss (08/20/2018), Long-term current use of intravenous immunoglobulin (IVIG), and PONV (postoperative nausea and vomiting).    Surgical History:  has a past surgical history that includes Intraluminal gastrointestinal tract imaging via capsule (N/A, 05/05/2021); TONSILLECTOMY, ADENOIDECTOMY; Cholecystectomy; Spine surgery; Robot-assisted surgical removal of spleen using da Ceferino Xi (N/A, 12/15/2022); Colonoscopy (N/A, 02/13/2023); Colonoscopy (N/A, 12/19/2023); Splenectomy; and TONSILLECTOMY, ADENOIDECTOMY (Bilateral, 12/26/2023).    Family History: family history includes Asthma in her father; Diabetes in her paternal grandmother; Gout in her father; Hyperlipidemia in her maternal grandmother; Hypertension in her father and paternal grandfather; No Known Problems in her brother, brother, and mother..    Social History:  reports that she has never smoked. She has never used smokeless tobacco. She reports that she does not drink alcohol and does not use drugs.    Review of patient's allergies indicates:   Allergen Reactions    Rituximab Swelling, Other (See Comments) and Rash     Headache too  Headache too      Nsaids (non-steroidal  anti-inflammatory drug) Other (See Comments)     Crohn's disease       Medications:   Medications Prior to Admission   Medication Sig Dispense Refill Last Dose/Taking    aspirin 81 mg Cap Take 81 mg by mouth once daily.   12/16/2024    atomoxetine (STRATTERA) 40 MG capsule Take 1 capsule (40 mg total) by mouth once daily. 30 capsule 1 12/16/2024    clonazePAM (KLONOPIN) 0.5 MG tablet Take 1 tablet (0.5 mg total) by mouth daily as needed for Anxiety. 30 tablet 0 12/16/2024    FLUoxetine (PROZAC) 40 MG capsule Take 1 capsule (40 mg total) by mouth once daily. 30 capsule 2 12/16/2024    omeprazole (PRILOSEC) 20 MG capsule Take 20 mg by mouth as needed.   Past Week    ondansetron (ZOFRAN-ODT) 4 MG TbDL Take 1 tablet (4 mg total) by mouth every 6 (six) hours as needed (Nausea). 20 tablet 1 Past Week    QUEtiapine (SEROQUEL) 50 MG tablet Take 1 tablet (50 mg total) by mouth every evening. 30 tablet 2 12/16/2024    azelastine (ASTELIN) 137 mcg (0.1 %) nasal spray 1 spray (137 mcg total) by Nasal route 2 (two) times daily. 30 mL 11     buPROPion (WELLBUTRIN XL) 300 MG 24 hr tablet Take 1 tablet (300 mg total) by mouth once daily. 30 tablet 2     clindamycin (CLEOCIN T) 1 % external solution APPLY EVERY DAY use as spot treatment       galcanezumab-gnlm 120 mg/mL PnIj Inject 1 mL (120 mg total) into the skin every 28 days. 3 mL 11     lasmiditan (REYVOW) tablet 100 mg Take 1 tablet by mouth nightly as needed (Max 2-3 times a week). 9 tablet 3 More than a month    linaCLOtide (LINZESS) 72 mcg Cap capsule Take 2 capsules (144 mcg total) by mouth daily as needed. 25 capsule 5     sulfacetamide sodium-sulfur 10-5 % (w/w) Clsr APPLY a small amount to skin once a day]       ustekinumab (STELARA) 90 mg/mL Syrg syringe Inject 1 mL (90 mg total) into the skin every 8 weeks. 1 mL 1     ZILXI 1.5 % Foam Apply topically every evening.          Physical Exam:    Vital Signs:   Vitals:    12/17/24 1233   BP: 115/63   Pulse: 96   Resp: 16    Temp: 98.2 °F (36.8 °C)       General Appearance: Well appearing in no acute distress  Abdomen: Soft, non tender, non distended with normal bowel sounds, no masses    Labs:  Lab Results   Component Value Date    WBC 8.40 09/27/2024    HGB 13.4 09/27/2024    HCT 41.2 09/27/2024     (H) 09/27/2024    ALT 24 09/27/2024    AST 22 09/27/2024     09/27/2024    K 4.1 09/27/2024     09/27/2024    CREATININE 0.8 09/27/2024    BUN 9 09/27/2024    CO2 22 (L) 09/27/2024    TSH 2.044 03/26/2024    INR 1.2 01/05/2021       I have explained the risks and benefits of this endoscopic procedure to the patient including but not limited to bleeding, inflammation, infection, perforation, and death.      Bertram Mcdaniel MD

## 2024-12-17 NOTE — NURSING TRANSFER
Pain constant sharp 6/10 to LLQ, tears rolling down face. Notified Dr. Mcdaneil. Stat abdominal xray ordered

## 2024-12-17 NOTE — PROGRESS NOTES
After colonoscopy pt has significant abdominal pain LLQ with guarding, no rebound and soft abdomen. Abd xray unrevealing. Pain improved prior to fentanyl given. Pt felt well on discharge and no further workup needed.

## 2024-12-17 NOTE — PROGRESS NOTES
Pt up to bathroom to void. Pt voided. Pt still feels sharp pain to LLQ. Dr. Mcdaniel at bedside reassessing. Fentanyl given per order at this time.

## 2024-12-17 NOTE — ANESTHESIA POSTPROCEDURE EVALUATION
Anesthesia Post Evaluation    Patient: Mini Marcus    Procedure(s) Performed: Procedure(s) (LRB):  COLONOSCOPY (N/A)    Final Anesthesia Type: general      Patient location during evaluation: GI PACU  Patient participation: Yes- Able to Participate  Level of consciousness: awake and alert  Post-procedure vital signs: reviewed and stable  Pain management: adequate  Airway patency: patent  MARIA ESTHER mitigation strategies: Multimodal analgesia  PONV status at discharge: No PONV  Anesthetic complications: no      Cardiovascular status: blood pressure returned to baseline, hemodynamically stable and stable  Respiratory status: unassisted, room air and spontaneous ventilation  Hydration status: euvolemic  Follow-up not needed.  Comments: Reports 6/10 sharp stabbing pain to LLQ. Guarded on my abdominal exam, but Dr. Oliver reports soft at LLQ. Ordered KUB to r/o perforation, hung IVF's, and gave Fentanyl PRN.              Vitals Value Taken Time   /59 12/17/24 1502   Temp 36.6 °C (97.9 °F) 12/17/24 1410   Pulse 86 12/17/24 1502   Resp 16 12/17/24 1450   SpO2 99 % 12/17/24 1502         No case tracking events are documented in the log.      Pain/Maninder Score: Maninder Score: 10 (12/17/2024  2:50 PM)

## 2024-12-17 NOTE — PROVATION PATIENT INSTRUCTIONS
Discharge Summary/Instructions after an Endoscopic Procedure  Patient Name: Mini Marcus  Patient MRN: [id number]  Patient YOB: 2005  Tuesday, December 17, 2024  Bertram Mcdaniel MD  Dear patient,  As a result of recent federal legislation (The Federal Cures Act), you may   receive lab or pathology results from your procedure in your [facility]   account before your physician is able to contact you. Your physician or   their representative will relay the results to you with their   recommendations at their soonest availability.  Thank you,  RESTRICTIONS:  During your procedure today, you received medications for sedation.  These   medications may affect your judgment, balance and coordination.  Therefore,   for 24 hours, you have the following restrictions:   - DO NOT drive a car, operate machinery, make legal/financial decisions,   sign important papers or drink alcohol.    ACTIVITY:  Today: no heavy lifting, straining or running due to procedural   sedation/anesthesia.  The following day: return to full activity including work.  DIET:  Eat and drink normally unless instructed otherwise.     TREATMENT FOR COMMON SIDE EFFECTS:  - Mild abdominal pain, nausea, belching, bloating or excessive gas:  rest,   eat lightly and use a heating pad.  - Sore Throat: treat with throat lozenges and/or gargle with warm salt   water.  - Because air was used during the procedure, expelling large amounts of air   from your rectum or belching is normal.  - If a bowel prep was taken, you may not have a bowel movement for 1-3 days.    This is normal.  SYMPTOMS TO WATCH FOR AND REPORT TO YOUR PHYSICIAN:  1. Abdominal pain or bloating, other than gas cramps.  2. Chest pain.  3. Back pain.  4. Signs of infection such as: chills or fever occurring within 24 hours   after the procedure.  5. Rectal bleeding, which would show as bright red, maroon, or black stools.   (A tablespoon of blood from the rectum is not serious, especially  if   hemorrhoids are present.)  6. Vomiting.  7. Weakness or dizziness.  GO DIRECTLY TO THE NEAREST EMERGENCY ROOM IF YOU HAVE ANY OF THE FOLLOWING:      Difficulty breathing              Chills and/or fever over 101 F   Persistent vomiting and/or vomiting blood   Severe abdominal pain   Severe chest pain   Black, tarry stools   Bleeding- more than one tablespoon   Any other symptom or condition that you feel may need urgent attention  Your doctor recommends these additional instructions:  If any biopsies were taken, your doctors clinic will contact you in 1 to 2   weeks with any results.  - Discharge patient to home.   - Patient has a contact number available for emergencies.  The signs and   symptoms of potential delayed complications were discussed with the   patient.  Return to normal activities tomorrow.  Written discharge   instructions were provided to the patient.   - Resume previous diet.   - Continue present medications.   - Await pathology results.   - Repeat colonoscopy in 1 year to assess disease activity.   - Return to GI clinic as previously scheduled.  For questions, problems or results please call your physician - Bertram Mcdaniel MD at Work:  [phone].  [facility], EMERGENCY ROOM PHONE NUMBER: [phone]  IF A COMPLICATION OR EMERGENCY SITUATION ARISES AND YOU ARE UNABLE TO REACH   YOUR PHYSICIAN - GO DIRECTLY TO THE EMERGENCY ROOM.  Bertram Mcdaniel MD  12/17/2024 2:14:42 PM  This report has been verified and signed electronically.  Dear patient,  As a result of recent federal legislation (The Federal Cures Act), you may   receive lab or pathology results from your procedure in your [facility]   account before your physician is able to contact you. Your physician or   their representative will relay the results to you with their   recommendations at their soonest availability.  Thank you,  PROVATION

## 2024-12-17 NOTE — PROGRESS NOTES
Pt c/o sharp LLQ pain. Dr. Mcdaniel assessed, abdomen soft to palpation. BP 91/53. HR 90. MD to assess pt again prior to discharge

## 2024-12-17 NOTE — ANESTHESIA PREPROCEDURE EVALUATION
12/17/2024  Mini Marcus is a 19 y.o., female.      Patient Name: Mini Marcus  YOB: 2005  MRN: [id number]  [facility]: [id number]      Code Status: Prior   Date of Procedure: 12/17/2024  Anesthesia: Choice Procedure: Procedure(s) (LRB):  COLONOSCOPY (N/A)  Pre-Operative Diagnosis: Crohn's disease with complication, unspecified gastrointestinal tract location [K50.919]  Proceduralist: Surgeons and Role:     * Bertram Mcdaniel MD - Primary Registered Nurse: Louise Freed RN      SUBJECTIVE:   Mini Marcus is a 19 y.o. female who  has a past medical history of Chronic constipation, Chronic ITP (idiopathic thrombocytopenia), Crohn's disease (ileum), GERD (gastroesophageal reflux disease), Inflammatory bowel disease, Iron deficiency anemia due to chronic blood loss (08/20/2018), Long-term current use of intravenous immunoglobulin (IVIG), and PONV (postoperative nausea and vomiting). No notes on file    ALLERGIES:     Review of patient's allergies indicates:   Allergen Reactions    Rituximab Swelling, Other (See Comments) and Rash     Headache too  Headache too      Nsaids (non-steroidal anti-inflammatory drug) Other (See Comments)     Crohn's disease     MEDICATIONS:     Current Facility-Administered Medications   Medication Dose Route Frequency Provider Last Rate Last Admin    0.9% NaCl infusion   Intravenous Continuous Bertram Mcdaniel MD              History:     Patient Active Problem List   Diagnosis    Adolescent idiopathic scoliosis of thoracolumbar region    Menorrhagia with irregular cycle    Classical migraine with intractable migraine with aura    Seasonal allergic rhinitis due to pollen    Crohn's disease (ileum)    ADHD (attention deficit hyperactivity disorder) evaluation    History of ITP    Drug-induced insomnia    Adrenal cortical steroids causing adverse effect in  therapeutic use    VELASQUEZ (generalized anxiety disorder)    History of transfusion reaction    H/O splenectomy    Numbness and tingling in left hand    Thrombocytosis    Sinusitis     Past Medical History:   Diagnosis Date    Chronic constipation     Chronic ITP (idiopathic thrombocytopenia)     Crohn's disease (ileum)     GERD (gastroesophageal reflux disease)     Inflammatory bowel disease     Iron deficiency anemia due to chronic blood loss 08/20/2018    Last Assessment & Plan:   Secondary to prolonged menstrual bleeding.  Continue ferrous sulfate 650mg PO BID.   Discussed other OCP options with Gyn.      Long-term current use of intravenous immunoglobulin (IVIG)     PONV (postoperative nausea and vomiting)      Surgical History:    has a past surgical history that includes Intraluminal gastrointestinal tract imaging via capsule (N/A, 05/05/2021); TONSILLECTOMY, ADENOIDECTOMY; Cholecystectomy; Spine surgery; Robot-assisted surgical removal of spleen using da Ceferino Xi (N/A, 12/15/2022); Colonoscopy (N/A, 02/13/2023); Colonoscopy (N/A, 12/19/2023); Splenectomy; and TONSILLECTOMY, ADENOIDECTOMY (Bilateral, 12/26/2023).   Social History:    reports never being sexually active.  reports that she has never smoked. She has never used smokeless tobacco. She reports that she does not drink alcohol and does not use drugs.       Pre-op Assessment    I have reviewed the Patient Summary Reports.     I have reviewed the Nursing Notes. I have reviewed the NPO Status.   I have reviewed the Medications.     Review of Systems  Anesthesia Hx:  No problems with previous Anesthesia             Denies Family Hx of Anesthesia complications.    Denies Personal Hx of Anesthesia complications.                    Hematology/Oncology:  Hematology Normal                                     Cardiovascular:  Cardiovascular Normal                                              Hepatic/GI:     GERD                Musculoskeletal:  Musculoskeletal  Normal                Neurological:  Neurology Normal                                      Dermatological:  Skin Normal        Physical Exam  General: Cooperative, Alert and Oriented    Airway:  Mallampati: II   Mouth Opening: Normal  TM Distance: Normal  Tongue: Normal  Neck ROM: Normal ROM    Dental:  Intact        Anesthesia Plan  Type of Anesthesia, risks & benefits discussed:    Anesthesia Type: Gen Natural Airway  Intra-op Monitoring Plan: Standard ASA Monitors  Induction:  IV  Informed Consent: Informed consent signed with the Patient and all parties understand the risks and agree with anesthesia plan.  All questions answered.   ASA Score: 2  Day of Surgery Review of History & Physical: H&P Update referred to the surgeon/provider.I have interviewed and examined the patient. I have reviewed the patient's H&P dated: There are no significant changes.     Ready For Surgery From Anesthesia Perspective.     .

## 2024-12-19 LAB
FINAL PATHOLOGIC DIAGNOSIS: NORMAL
GROSS: NORMAL
Lab: NORMAL

## 2024-12-25 ENCOUNTER — PATIENT MESSAGE (OUTPATIENT)
Dept: GASTROENTEROLOGY | Facility: CLINIC | Age: 19
End: 2024-12-25
Payer: COMMERCIAL

## 2024-12-25 DIAGNOSIS — F41.1 GAD (GENERALIZED ANXIETY DISORDER): ICD-10-CM

## 2024-12-26 RX ORDER — QUETIAPINE FUMARATE 50 MG/1
50 TABLET, FILM COATED ORAL NIGHTLY
Qty: 30 TABLET | Refills: 2 | Status: SHIPPED | OUTPATIENT
Start: 2024-12-26 | End: 2025-12-26

## 2024-12-26 RX ORDER — CLONAZEPAM 0.5 MG/1
0.5 TABLET ORAL DAILY PRN
Qty: 30 TABLET | Refills: 0 | Status: SHIPPED | OUTPATIENT
Start: 2024-12-26 | End: 2025-12-26

## 2024-12-26 RX ORDER — ATOMOXETINE 40 MG/1
40 CAPSULE ORAL DAILY
Qty: 30 CAPSULE | Refills: 1 | Status: SHIPPED | OUTPATIENT
Start: 2024-12-26 | End: 2025-02-24

## 2024-12-26 NOTE — TELEPHONE ENCOUNTER
- CC: ongoing post-procedure pain since 12/17/24  - CD ileum w/ chronic constipation   - Current IBD meds: Stelara q 8 weeks (started 5/25/23; LD: 12/10, ND: 2/4/25), dulcolax prn constipation (Linzess not covered by insurance)  - 12/17/24 colonoscopy: endoscopic mild ileitis, biopsies normal  - 12/17/24 significant LLQ pain w/ guarding post colonoscopy; abdominal x-ray unrevealing     - Intermittent LLQ/ pelvic menstrual cycle like-cramping 6/10 w/o alleviating/ aggravating factors; on-going from 12/17/24  - Menstrual cycle started prior to 12/17 colonoscopy & on-going  - IUD placed July 2024  - States pain is different from prior CD pain  - 0-1 BM q few days & plans to take Dulcolax soon  - On chart review pt experienced similar symptoms 11/2024 including unpredictable heavy menstrual cycles & significant menstrual cramping & was instructed to contact OB/GYN   - Reinforced need to contact OB/GYN for on-going cycle related symptoms  - Pt expressed understanding

## 2025-01-02 ENCOUNTER — LAB VISIT (OUTPATIENT)
Dept: LAB | Facility: HOSPITAL | Age: 20
End: 2025-01-02
Attending: NURSE PRACTITIONER
Payer: COMMERCIAL

## 2025-01-02 ENCOUNTER — PATIENT MESSAGE (OUTPATIENT)
Dept: HEMATOLOGY/ONCOLOGY | Facility: CLINIC | Age: 20
End: 2025-01-02
Payer: COMMERCIAL

## 2025-01-02 DIAGNOSIS — D50.0 IRON DEFICIENCY ANEMIA DUE TO CHRONIC BLOOD LOSS: ICD-10-CM

## 2025-01-02 DIAGNOSIS — Z86.2 HISTORY OF ITP: ICD-10-CM

## 2025-01-02 DIAGNOSIS — N92.1 MENORRHAGIA WITH IRREGULAR CYCLE: ICD-10-CM

## 2025-01-02 DIAGNOSIS — D75.839 THROMBOCYTOSIS: ICD-10-CM

## 2025-01-02 LAB
BASOPHILS # BLD AUTO: 0.13 K/UL (ref 0–0.2)
BASOPHILS NFR BLD: 1.5 % (ref 0–1.9)
DIFFERENTIAL METHOD BLD: ABNORMAL
EOSINOPHIL # BLD AUTO: 0.5 K/UL (ref 0–0.5)
EOSINOPHIL NFR BLD: 6 % (ref 0–8)
ERYTHROCYTE [DISTWIDTH] IN BLOOD BY AUTOMATED COUNT: 13.7 % (ref 11.5–14.5)
FERRITIN SERPL-MCNC: 105 NG/ML (ref 20–300)
HCT VFR BLD AUTO: 42 % (ref 37–48.5)
HGB BLD-MCNC: 13.7 G/DL (ref 12–16)
IMM GRANULOCYTES # BLD AUTO: 0.04 K/UL (ref 0–0.04)
IMM GRANULOCYTES NFR BLD AUTO: 0.5 % (ref 0–0.5)
IRON SERPL-MCNC: 119 UG/DL (ref 30–160)
LYMPHOCYTES # BLD AUTO: 3.2 K/UL (ref 1–4.8)
LYMPHOCYTES NFR BLD: 37.1 % (ref 18–48)
MCH RBC QN AUTO: 27.7 PG (ref 27–31)
MCHC RBC AUTO-ENTMCNC: 32.6 G/DL (ref 32–36)
MCV RBC AUTO: 85 FL (ref 82–98)
MONOCYTES # BLD AUTO: 1 K/UL (ref 0.3–1)
MONOCYTES NFR BLD: 11.7 % (ref 4–15)
NEUTROPHILS # BLD AUTO: 3.8 K/UL (ref 1.8–7.7)
NEUTROPHILS NFR BLD: 43.2 % (ref 38–73)
NRBC BLD-RTO: 0 /100 WBC
PLATELET # BLD AUTO: 613 K/UL (ref 150–450)
PMV BLD AUTO: 8.6 FL (ref 9.2–12.9)
RBC # BLD AUTO: 4.94 M/UL (ref 4–5.4)
SATURATED IRON: 33 % (ref 20–50)
TOTAL IRON BINDING CAPACITY: 360 UG/DL (ref 250–450)
TRANSFERRIN SERPL-MCNC: 243 MG/DL (ref 200–375)
WBC # BLD AUTO: 8.71 K/UL (ref 3.9–12.7)

## 2025-01-02 PROCEDURE — 36415 COLL VENOUS BLD VENIPUNCTURE: CPT | Mod: PO | Performed by: NURSE PRACTITIONER

## 2025-01-02 PROCEDURE — 84466 ASSAY OF TRANSFERRIN: CPT | Performed by: NURSE PRACTITIONER

## 2025-01-02 PROCEDURE — 82728 ASSAY OF FERRITIN: CPT | Performed by: NURSE PRACTITIONER

## 2025-01-02 PROCEDURE — 85025 COMPLETE CBC W/AUTO DIFF WBC: CPT | Mod: PO | Performed by: NURSE PRACTITIONER

## 2025-01-03 ENCOUNTER — TELEPHONE (OUTPATIENT)
Dept: GASTROENTEROLOGY | Facility: CLINIC | Age: 20
End: 2025-01-03
Payer: COMMERCIAL

## 2025-01-03 NOTE — TELEPHONE ENCOUNTER
"Spoke with patient to inform plan of care with UA w/ reflex, UA cultures, and stool vini. She advised she "doesn't even know if she will be able to urinate enough, and the pain continues to get worse." Recommended she goes to the nearest ER. Patient agreed and stated she will go to [facility] ER. Dr. Mcdaniel will be updated.  "

## 2025-01-03 NOTE — TELEPHONE ENCOUNTER
CC: increased abdominal pain, increased blood with BM. Dysuria.    Crohn's disease (ileum)   IBD meds:  - stelara 90 mg SC q 8 weeks     Symtoms  - 1 BM, hard  - Red blood, TP saturated, small amount in toilet water, small amount on stool  - 6/10 abdominal pain, nothing makes better or worse.  - Denies fever, CP, SOB, dizziness  - dysuria, pink tinge on TP, feels discomfort as though she needs to urinate more but cannot.  - Last menstrual cycle 12/17  - CT A/P scheduled for 1/15/25    Dr. Mcdaniel to be updated.

## 2025-01-04 PROBLEM — N30.01 ACUTE CYSTITIS WITH HEMATURIA: Status: ACTIVE | Noted: 2025-01-04

## 2025-01-07 ENCOUNTER — TELEPHONE (OUTPATIENT)
Dept: PSYCHIATRY | Facility: CLINIC | Age: 20
End: 2025-01-07
Payer: COMMERCIAL

## 2025-01-07 ENCOUNTER — PATIENT MESSAGE (OUTPATIENT)
Dept: PSYCHIATRY | Facility: CLINIC | Age: 20
End: 2025-01-07
Payer: COMMERCIAL

## 2025-01-07 RX ORDER — ATOMOXETINE 40 MG/1
40 CAPSULE ORAL DAILY
Qty: 30 CAPSULE | Refills: 3 | Status: SHIPPED | OUTPATIENT
Start: 2025-01-07 | End: 2025-05-07

## 2025-01-07 NOTE — TELEPHONE ENCOUNTER
Patient called asking if you can send Strattera RX to [facility] on [address] in [place]. [facility] is making her pay out of pocket.

## 2025-01-12 ENCOUNTER — PATIENT MESSAGE (OUTPATIENT)
Dept: GASTROENTEROLOGY | Facility: CLINIC | Age: 20
End: 2025-01-12
Payer: COMMERCIAL

## 2025-01-13 DIAGNOSIS — K50.00 CROHN'S DISEASE OF SMALL INTESTINE WITHOUT COMPLICATION: Primary | ICD-10-CM

## 2025-01-13 RX ORDER — DICYCLOMINE HYDROCHLORIDE 10 MG/1
10 CAPSULE ORAL
Qty: 120 CAPSULE | Refills: 0 | OUTPATIENT
Start: 2025-01-13 | End: 2025-02-12

## 2025-01-13 RX ORDER — DICYCLOMINE HYDROCHLORIDE 10 MG/1
10 CAPSULE ORAL
Qty: 120 CAPSULE | Refills: 0 | Status: SHIPPED | OUTPATIENT
Start: 2025-01-13 | End: 2025-02-12

## 2025-01-15 RX ORDER — FLUOXETINE HYDROCHLORIDE 40 MG/1
40 CAPSULE ORAL DAILY
Qty: 30 CAPSULE | Refills: 2 | Status: SHIPPED | OUTPATIENT
Start: 2025-01-15 | End: 2026-01-15

## 2025-01-20 DIAGNOSIS — K50.00 CROHN'S DISEASE OF SMALL INTESTINE WITHOUT COMPLICATION: ICD-10-CM

## 2025-01-21 RX ORDER — USTEKINUMAB 90 MG/ML
90 INJECTION, SOLUTION SUBCUTANEOUS
Qty: 1 ML | Refills: 0 | Status: ACTIVE | OUTPATIENT
Start: 2025-01-21

## 2025-01-21 NOTE — TELEPHONE ENCOUNTER
"Primary provider: Dr. Etta Mcdaniel    IBD medications: Stelara 90 mg SQ Q 8 weeks    Refill request for:      Pended Medication(s)   Requested Prescriptions     Pending Prescriptions Disp Refills    ustekinumab (STELARA) 90 mg/mL Syrg syringe 1 mL 1     Sig: Inject 1 mL (90 mg total) into the skin every 8 weeks.           Allergies reviewed: Yes    Drug Monitoring labs/frequency for all IBD meds:    CBC and CMP every 6 months  TB and Hep B every 12 months    Lab Results   Component Value Date    HEPBSAG Non-reactive 09/06/2024    HEPBCAB Non-reactive 09/06/2024     Lab Results   Component Value Date    TBGOLDPLUS Negative 09/06/2024     No results found for: "QUANTNILVALU", "QUANTIFERON", "QUANTTBGDPL"   No results found for: "TSPOTSCREN"  Lab Results   Component Value Date    CJPARGFR03ZV 32 04/18/2022    TICAYJJX84 337 09/06/2023     Lab Results   Component Value Date    WBC 12.10 01/05/2025    HGB 12.9 01/05/2025    HCT 38.6 01/05/2025    MCV 86 01/05/2025     (H) 01/05/2025     Lab Results   Component Value Date    CREATININE 0.74 01/05/2025    ALBUMIN 4.3 09/27/2024    BILITOT 0.3 09/27/2024    ALKPHOS 104 09/27/2024    AST 22 09/27/2024    ALT 24 09/27/2024       Lab due date (mo/yr):  3/25    Labs scheduled: No - but patient has an OV before or when labs are due     Next appt: 3/14/25    RX refill sent to provider for amount until next labs: Yes       "

## 2025-01-28 DIAGNOSIS — F41.1 GAD (GENERALIZED ANXIETY DISORDER): ICD-10-CM

## 2025-01-28 RX ORDER — CLONAZEPAM 0.5 MG/1
0.5 TABLET ORAL DAILY PRN
Qty: 30 TABLET | Refills: 0 | Status: SHIPPED | OUTPATIENT
Start: 2025-01-28 | End: 2026-01-28

## 2025-01-28 RX ORDER — QUETIAPINE FUMARATE 50 MG/1
50 TABLET, FILM COATED ORAL NIGHTLY
Qty: 30 TABLET | Refills: 2 | Status: SHIPPED | OUTPATIENT
Start: 2025-01-28 | End: 2026-01-28

## 2025-02-06 ENCOUNTER — OFFICE VISIT (OUTPATIENT)
Dept: PSYCHIATRY | Facility: CLINIC | Age: 20
End: 2025-02-06
Payer: COMMERCIAL

## 2025-02-06 VITALS
HEART RATE: 101 BPM | DIASTOLIC BLOOD PRESSURE: 75 MMHG | WEIGHT: 157.63 LBS | BODY MASS INDEX: 25.33 KG/M2 | SYSTOLIC BLOOD PRESSURE: 108 MMHG | HEIGHT: 66 IN

## 2025-02-06 DIAGNOSIS — F19.982 DRUG-INDUCED INSOMNIA: ICD-10-CM

## 2025-02-06 DIAGNOSIS — Z13.39 ADHD (ATTENTION DEFICIT HYPERACTIVITY DISORDER) EVALUATION: ICD-10-CM

## 2025-02-06 DIAGNOSIS — F41.1 GAD (GENERALIZED ANXIETY DISORDER): Primary | ICD-10-CM

## 2025-02-06 PROCEDURE — 3074F SYST BP LT 130 MM HG: CPT | Mod: CPTII,S$GLB,, | Performed by: NURSE PRACTITIONER

## 2025-02-06 PROCEDURE — 3078F DIAST BP <80 MM HG: CPT | Mod: CPTII,S$GLB,, | Performed by: NURSE PRACTITIONER

## 2025-02-06 PROCEDURE — 3008F BODY MASS INDEX DOCD: CPT | Mod: CPTII,S$GLB,, | Performed by: NURSE PRACTITIONER

## 2025-02-06 PROCEDURE — 99999 PR PBB SHADOW E&M-EST. PATIENT-LVL IV: CPT | Mod: PBBFAC,,, | Performed by: NURSE PRACTITIONER

## 2025-02-06 PROCEDURE — 1159F MED LIST DOCD IN RCRD: CPT | Mod: CPTII,S$GLB,, | Performed by: NURSE PRACTITIONER

## 2025-02-06 PROCEDURE — 90833 PSYTX W PT W E/M 30 MIN: CPT | Mod: S$GLB,,, | Performed by: NURSE PRACTITIONER

## 2025-02-06 PROCEDURE — 1160F RVW MEDS BY RX/DR IN RCRD: CPT | Mod: CPTII,S$GLB,, | Performed by: NURSE PRACTITIONER

## 2025-02-06 PROCEDURE — 99214 OFFICE O/P EST MOD 30 MIN: CPT | Mod: S$GLB,,, | Performed by: NURSE PRACTITIONER

## 2025-02-06 RX ORDER — CITALOPRAM 10 MG/1
10 TABLET ORAL DAILY
Qty: 30 TABLET | Refills: 2 | Status: SHIPPED | OUTPATIENT
Start: 2025-02-06 | End: 2026-02-06

## 2025-02-06 RX ORDER — QUETIAPINE FUMARATE 50 MG/1
50 TABLET, FILM COATED ORAL NIGHTLY
Qty: 30 TABLET | Refills: 5 | Status: SHIPPED | OUTPATIENT
Start: 2025-02-06 | End: 2026-02-06

## 2025-02-06 RX ORDER — LEVONORGESTREL 19.5 MG/1
INTRAUTERINE DEVICE INTRAUTERINE
COMMUNITY
Start: 2024-04-03

## 2025-02-06 RX ORDER — FLUOXETINE 10 MG/1
CAPSULE ORAL
Qty: 10 CAPSULE | Refills: 0 | Status: SHIPPED | OUTPATIENT
Start: 2025-02-06

## 2025-02-06 NOTE — PROGRESS NOTES
"Outpatient Psychiatry Follow-Up Visit    Clinical Status of Patient: Outpatient (Ambulatory)  02/06/2025      Chief Complaint: Pt is a 18 yo F who presents today for a follow-up. Met with patient.       Interval History and Content of Current Session:  Interim Events/Subjective Report/Content of Current Session:  follow up appointment.    Pt is a 18 yo F with past psychiatric hx of VELASQUEZ, concentration deficit, who presents for follow up treatment. She is currently taking Seroquel 50mg QHS (insomnia), Klonopin 0.5mg, Prozac 40mg QD. Meds therapeutic and well-tolerated.     Pt notes improvement in fatigue and focus with Strattera. She is paying out of pocket for this as she does meet criteria for ADD/ADHD. She still struggles with poor quality of sleep but this can be largely attributed to pain and health conditions. She does note periods of extreme restlessness and agitation so that has been a challenge with keeping her on Strattera. Anxiety/worrying also problematic - I am starting to question whether or not Prozac has become too stimulating for her. She has engaged in skin picking and nail biting as a result. We discuss alternative SSRIs today. OK to start trial of Celexa as she did not do well on Lexapro or Zoloft.       AIMS: no abnormal movements noted or reported    Past Psychiatric hx: Pt. is a 17 yo F with a past psychiatric hx of "illness anxiety disorder" and "mdd" presenting for initial eval and med mgmt. PT presented to me taking Zoloft 75mg QD (has been taking for around 1.5 years ago through pediatric hematologist) and denies any past med trials.  Denies hx of inpatient psych, denies past suicidal behaviors.     Pt reports suffering from chronic ITP since 7th grade and has undergone significant treatments for this. Pt notes that she recently had her spleen removed. Pt notes she was initially prescribed zoloft at age 17 secondary to anxiety/stress secondary to her medical diagnoses. This did have a " "positive impact on her mood and anxiety levels. She also notes being treated with high dose steroids over the course of a year which caused many setbacks in regards to her mental health. In addition, pt notes undergoing a spinal fusion at age 15 which was followed by a year long recovery. She d/c her Zoloft and noticed increasing depression and anxiety. Pt acknowledges that the majority of her symptoms are secondary to her extensive medical history.      Lately, she notes significant nighttime anxiety, restlessness, tension that interferes with her ability to fall and stay asleep. "I feel a huge sense of panic". Notes daily generalized, ruminative worry. Sleep is poor. "I just need to sleep. I am so exhausted. I am so tired all day."      Past Medical hx:   Past Medical History:   Diagnosis Date    Chronic constipation     Chronic ITP (idiopathic thrombocytopenia)     Crohn's disease (ileum)     GERD (gastroesophageal reflux disease)     Inflammatory bowel disease     Iron deficiency anemia due to chronic blood loss 08/20/2018    Last Assessment & Plan:   Secondary to prolonged menstrual bleeding.  Continue ferrous sulfate 650mg PO BID.   Discussed other OCP options with Gyn.      Long-term current use of intravenous immunoglobulin (IVIG)     PONV (postoperative nausea and vomiting)         Interim hx:    Denies suicidal/homicidal ideations.  Denies hopelessness/worthlessness.    Denies auditory/visual hallucinations      Susbtance use: denies      Review of Systems   PSYCHIATRIC: Pertinent items are noted in the narrative.        CONSTITUTIONAL: weight stable        M/S: no pain today         ENT: no allergies noted today        ABD: no n/v/d     Past Medical, Family and Social History: The patient's past medical, family and social history have been reviewed and updated as appropriate within the electronic medical record. See encounter notes.       Compliance: yes           Risk Parameters:  Patient reports no " "suicidal ideation  Patient reports no homicidal ideation  Patient reports no self-injurious behavior  Patient reports no violent behavior     Exam (detailed: at least 9 elements; comprehensive: all 15 elements)   Constitutional  Vitals:  Most recent vital signs, dated less than 90 days prior to this appointment, were reviewed.       General:  unremarkable, age appropriate, casual attire, good eye contact, good rapport       Musculoskeletal  Muscle Strength/Tone:  no flaccidity, no tremor    Gait & Station:  normal      Psychiatric                       Speech:  normal tone, normal rate, rhythm, and volume   Mood & Affect:   Euthymic, congruent, appropriate         Thought Process:   Goal directed; Linear    Associations:   intact   Thought Content:   No SI/HI, delusions, or paranoia, no AV/VH   Insight & Judgement:   Good, adequate to circumstances   Orientation:   grossly intact; alert and oriented x 4    Memory:  intact for content of interview    Language:  grossly intact, can repeat    Attention Span  : Grossly intact for content of interview   Fund of Knowledge:   intact and appropriate to age and level of education        Assessment and Diagnosis   Status/Progress: Based on the examination today, the patient's problem(s) is/are under fair control.  New problems have not been presented today. Comorbidities are not currently complicating management of the primary condition.      Impression:    Pt is a 20 yo F with past psychiatric hx of "illness anxiety" who presents for follow up treatment. She is currently taking Seroquel 50mg QHS (insomnia), Klonopin 0.5mg, Prozac 40mg QD, Prazosin 2mg QHS (nightmares). Symptoms have been mostly treatment resistant since establishing care here. Symptoms like poor focus and chronic fatigue likely r/t chronic ITP/thrombocytosis/iron deficiency. Tried wellbutrin last visit but there were no appreciable benefits. She did not meet criteria for ADD after psychological evaluation " last week, but is frustrated at this. Between sessions, pt reports a progressively worsening level of focus. They note increases in distractibility and have a tendency to procrastinate. Overall level of functioning has suffered as a result. There is a reduced ability to accomplish tasks and meet deadlines.     She is willing to pay out of pocket for stimulants and I do feel she would benefit. Trial of Strattera has been ordered.       Diagnosis: VELASQUEZ    Intervention/Counseling/Treatment Plan   Medication Management:      1) Dec Prozac to 20mg QD x 7 days, then STOP. Start Celexa 10mg QD one day after stopping,  Discussed potential for GI side effects, sexual dysfunction, mood destabilization, headaches    2. Start Strattera 40mg QD for focus    3. Cont Seroquel 50mg qhs. Typical MILAGROS's reviewed including weight gain, abnormal movements, EPS, TD, metabolic side effects.     3. Cont Klonopin 0.5mg QHS for sleep/anxiety. Discussed risk of decreased RT, sedation, addictive potential, and not to mix with alcohol.      4. Call to report any worsening of symptoms or problems with the medication. Pt instructed to go to ER with thoughts of harming self, others     5. Patient given contact # for psychotherapists at [facility], [place] and also instructed she may check with insurance for list of providers.      6. Labs: no new orders      Return to clinic: 4-6 weeks - self schedule    Psychotherapy:   Target symptoms: inattention/distractibility, anxiety    Why chosen therapy is appropriate versus another modality: relevant to diagnosis, patient responds to this modality  Outcome monitoring methods: self-report, observation, feedback from family   Therapeutic intervention type: supportive psychotherapy  Topics discussed/themes: building skills sets for symptom management, symptom recognition, nutrition, exercise  The patient's response to the intervention is accepting. The patient's progress toward treatment goals is positive  progress.  Duration of intervention: 20 minutes     -Spent 30min face to face with the pt; >50% time spent in counseling   -Supportive therapy and psychoeducation provided  -R/B/SE's of medications discussed with the pt who expresses understanding and chooses to take medications as prescribed.   -Pt instructed to call clinic, 911 or go to nearest emergency room if sxs worsen or pt is in   crisis. The pt expresses understanding.    Carlos Durán, NP

## 2025-02-17 ENCOUNTER — PATIENT MESSAGE (OUTPATIENT)
Dept: GASTROENTEROLOGY | Facility: CLINIC | Age: 20
End: 2025-02-17
Payer: COMMERCIAL

## 2025-02-17 RX ORDER — AMOXICILLIN 875 MG/1
875 TABLET, FILM COATED ORAL EVERY 12 HOURS
COMMUNITY
Start: 2025-02-13

## 2025-02-17 RX ORDER — FLUTICASONE PROPIONATE 50 MCG
2 SPRAY, SUSPENSION (ML) NASAL
COMMUNITY
Start: 2025-02-13

## 2025-02-17 RX ORDER — PROMETHAZINE HYDROCHLORIDE AND DEXTROMETHORPHAN HYDROBROMIDE 6.25; 15 MG/5ML; MG/5ML
5 SYRUP ORAL EVERY 4 HOURS PRN
COMMUNITY
Start: 2025-02-13

## 2025-02-17 NOTE — TELEPHONE ENCOUNTER
- Reason for Call: Change in condition URI last week  - Contact: Called & spoke to patient  - Date of Evaluation: 2/13/25  - Symptom details:   Productive cough (unsure of color), cough keeps her up at noc, chest hurts with coughing, pain under chin, fatigue  Denies fever now and prior to antibiotic therapy  - Disease phenotype: CD, Ileum  - Current IBD meds: Stelara q8w (started 5/25/23; LD: 1/29, ND:3/26)  - RX Adherence: Adherent   - Additional pertinent information (recent lab, scope, imaging, etc.):   States she finished the Amoxicillin but on med reconciliation it was a 7 day course prescribed on 2/13/25, says she took the last dose today so she must have been taking it more often than q12h  Finished DosePak  Advised if she has finished the antibiotic therapy and is still having significant sxs she should be  re-evaluated  Discussed the importance of communicating to this office when she has an infection d/t immunosuppression  She states understanding and agrees with this plan  - Next OV: 3/14/25  - Provider: Dr. Mcdaniel updated in Dr. Tavares's absence.

## 2025-02-24 DIAGNOSIS — F41.1 GAD (GENERALIZED ANXIETY DISORDER): ICD-10-CM

## 2025-02-25 RX ORDER — CLONAZEPAM 0.5 MG/1
0.5 TABLET ORAL DAILY PRN
Qty: 30 TABLET | Refills: 0 | Status: SHIPPED | OUTPATIENT
Start: 2025-02-25 | End: 2026-02-25

## 2025-03-11 ENCOUNTER — PATIENT MESSAGE (OUTPATIENT)
Dept: PSYCHIATRY | Facility: CLINIC | Age: 20
End: 2025-03-11
Payer: COMMERCIAL

## 2025-03-14 ENCOUNTER — LAB VISIT (OUTPATIENT)
Dept: LAB | Facility: HOSPITAL | Age: 20
End: 2025-03-14
Attending: INTERNAL MEDICINE
Payer: COMMERCIAL

## 2025-03-14 ENCOUNTER — RESULTS FOLLOW-UP (OUTPATIENT)
Dept: GASTROENTEROLOGY | Facility: CLINIC | Age: 20
End: 2025-03-14

## 2025-03-14 ENCOUNTER — OFFICE VISIT (OUTPATIENT)
Dept: GASTROENTEROLOGY | Facility: CLINIC | Age: 20
End: 2025-03-14
Payer: COMMERCIAL

## 2025-03-14 ENCOUNTER — TELEPHONE (OUTPATIENT)
Dept: GASTROENTEROLOGY | Facility: CLINIC | Age: 20
End: 2025-03-14
Payer: COMMERCIAL

## 2025-03-14 VITALS
SYSTOLIC BLOOD PRESSURE: 108 MMHG | OXYGEN SATURATION: 98 % | HEART RATE: 90 BPM | DIASTOLIC BLOOD PRESSURE: 78 MMHG | WEIGHT: 160.06 LBS | HEIGHT: 66 IN | BODY MASS INDEX: 25.72 KG/M2 | TEMPERATURE: 99 F

## 2025-03-14 DIAGNOSIS — T45.1X5A IMMUNODEFICIENCY DUE TO LONG TERM IMMUNOSUPPRESSIVE DRUG THERAPY: ICD-10-CM

## 2025-03-14 DIAGNOSIS — K50.00 CROHN'S DISEASE OF SMALL INTESTINE WITHOUT COMPLICATION: ICD-10-CM

## 2025-03-14 DIAGNOSIS — D84.821 IMMUNODEFICIENCY DUE TO LONG TERM IMMUNOSUPPRESSIVE DRUG THERAPY: ICD-10-CM

## 2025-03-14 DIAGNOSIS — Z79.899 IMMUNODEFICIENCY DUE TO LONG TERM IMMUNOSUPPRESSIVE DRUG THERAPY: ICD-10-CM

## 2025-03-14 DIAGNOSIS — R10.9 ABDOMINAL PAIN, UNSPECIFIED ABDOMINAL LOCATION: ICD-10-CM

## 2025-03-14 DIAGNOSIS — N30.01 ACUTE CYSTITIS WITH HEMATURIA: ICD-10-CM

## 2025-03-14 DIAGNOSIS — R10.31 RIGHT LOWER QUADRANT ABDOMINAL PAIN: ICD-10-CM

## 2025-03-14 DIAGNOSIS — R10.12 LEFT UPPER QUADRANT ABDOMINAL PAIN: Primary | Chronic | ICD-10-CM

## 2025-03-14 LAB
ALBUMIN SERPL BCP-MCNC: 4.1 G/DL (ref 3.5–5.2)
ALP SERPL-CCNC: 90 U/L (ref 40–150)
ALT SERPL W/O P-5'-P-CCNC: 32 U/L (ref 10–44)
ANION GAP SERPL CALC-SCNC: 9 MMOL/L (ref 8–16)
AST SERPL-CCNC: 18 U/L (ref 10–40)
BACTERIA #/AREA URNS AUTO: NORMAL /HPF
BASOPHILS # BLD AUTO: 0.09 K/UL (ref 0–0.2)
BASOPHILS NFR BLD: 0.9 % (ref 0–1.9)
BILIRUB SERPL-MCNC: 0.2 MG/DL (ref 0.1–1)
BILIRUB UR QL STRIP: NEGATIVE
BUN SERPL-MCNC: 12 MG/DL (ref 6–20)
CALCIUM SERPL-MCNC: 9.1 MG/DL (ref 8.7–10.5)
CHLORIDE SERPL-SCNC: 105 MMOL/L (ref 95–110)
CLARITY UR REFRACT.AUTO: ABNORMAL
CO2 SERPL-SCNC: 24 MMOL/L (ref 23–29)
COLOR UR AUTO: YELLOW
CREAT SERPL-MCNC: 0.9 MG/DL (ref 0.5–1.4)
CRP SERPL-MCNC: 0.4 MG/L (ref 0–8.2)
DIFFERENTIAL METHOD BLD: ABNORMAL
EOSINOPHIL # BLD AUTO: 0.5 K/UL (ref 0–0.5)
EOSINOPHIL NFR BLD: 4.5 % (ref 0–8)
ERYTHROCYTE [DISTWIDTH] IN BLOOD BY AUTOMATED COUNT: 13.2 % (ref 11.5–14.5)
EST. GFR  (NO RACE VARIABLE): >60 ML/MIN/1.73 M^2
GLUCOSE SERPL-MCNC: 78 MG/DL (ref 70–110)
GLUCOSE UR QL STRIP: NEGATIVE
HCG INTACT+B SERPL-ACNC: <2.4 MIU/ML
HCT VFR BLD AUTO: 40.5 % (ref 37–48.5)
HGB BLD-MCNC: 13.4 G/DL (ref 12–16)
HGB UR QL STRIP: ABNORMAL
IMM GRANULOCYTES # BLD AUTO: 0.03 K/UL (ref 0–0.04)
IMM GRANULOCYTES NFR BLD AUTO: 0.3 % (ref 0–0.5)
KETONES UR QL STRIP: NEGATIVE
LEUKOCYTE ESTERASE UR QL STRIP: NEGATIVE
LYMPHOCYTES # BLD AUTO: 3.7 K/UL (ref 1–4.8)
LYMPHOCYTES NFR BLD: 36.2 % (ref 18–48)
MCH RBC QN AUTO: 28.5 PG (ref 27–31)
MCHC RBC AUTO-ENTMCNC: 33.1 G/DL (ref 32–36)
MCV RBC AUTO: 86 FL (ref 82–98)
MICROSCOPIC COMMENT: NORMAL
MONOCYTES # BLD AUTO: 1.3 K/UL (ref 0.3–1)
MONOCYTES NFR BLD: 13.1 % (ref 4–15)
NEUTROPHILS # BLD AUTO: 4.6 K/UL (ref 1.8–7.7)
NEUTROPHILS NFR BLD: 45 % (ref 38–73)
NITRITE UR QL STRIP: NEGATIVE
NRBC BLD-RTO: 0 /100 WBC
PH UR STRIP: 6 [PH] (ref 5–8)
PLATELET # BLD AUTO: 524 K/UL (ref 150–450)
PMV BLD AUTO: 9.1 FL (ref 9.2–12.9)
POTASSIUM SERPL-SCNC: 3.8 MMOL/L (ref 3.5–5.1)
PROT SERPL-MCNC: 7.3 G/DL (ref 6–8.4)
PROT UR QL STRIP: ABNORMAL
RBC # BLD AUTO: 4.71 M/UL (ref 4–5.4)
RBC #/AREA URNS AUTO: 3 /HPF (ref 0–4)
SODIUM SERPL-SCNC: 138 MMOL/L (ref 136–145)
SP GR UR STRIP: 1.02 (ref 1–1.03)
SQUAMOUS #/AREA URNS AUTO: 11 /HPF
URN SPEC COLLECT METH UR: ABNORMAL
WBC # BLD AUTO: 10.16 K/UL (ref 3.9–12.7)
WBC #/AREA URNS AUTO: 2 /HPF (ref 0–5)

## 2025-03-14 PROCEDURE — 84702 CHORIONIC GONADOTROPIN TEST: CPT | Performed by: INTERNAL MEDICINE

## 2025-03-14 PROCEDURE — 86140 C-REACTIVE PROTEIN: CPT | Performed by: INTERNAL MEDICINE

## 2025-03-14 PROCEDURE — 80053 COMPREHEN METABOLIC PANEL: CPT | Performed by: INTERNAL MEDICINE

## 2025-03-14 PROCEDURE — 1159F MED LIST DOCD IN RCRD: CPT | Mod: CPTII,S$GLB,, | Performed by: INTERNAL MEDICINE

## 2025-03-14 PROCEDURE — 85025 COMPLETE CBC W/AUTO DIFF WBC: CPT | Performed by: INTERNAL MEDICINE

## 2025-03-14 PROCEDURE — G2211 COMPLEX E/M VISIT ADD ON: HCPCS | Mod: S$GLB,,, | Performed by: INTERNAL MEDICINE

## 2025-03-14 PROCEDURE — 3078F DIAST BP <80 MM HG: CPT | Mod: CPTII,S$GLB,, | Performed by: INTERNAL MEDICINE

## 2025-03-14 PROCEDURE — 3074F SYST BP LT 130 MM HG: CPT | Mod: CPTII,S$GLB,, | Performed by: INTERNAL MEDICINE

## 2025-03-14 PROCEDURE — 36415 COLL VENOUS BLD VENIPUNCTURE: CPT | Performed by: INTERNAL MEDICINE

## 2025-03-14 PROCEDURE — 81001 URINALYSIS AUTO W/SCOPE: CPT | Performed by: INTERNAL MEDICINE

## 2025-03-14 PROCEDURE — 99215 OFFICE O/P EST HI 40 MIN: CPT | Mod: S$GLB,,, | Performed by: INTERNAL MEDICINE

## 2025-03-14 PROCEDURE — 1160F RVW MEDS BY RX/DR IN RCRD: CPT | Mod: CPTII,S$GLB,, | Performed by: INTERNAL MEDICINE

## 2025-03-14 PROCEDURE — 3008F BODY MASS INDEX DOCD: CPT | Mod: CPTII,S$GLB,, | Performed by: INTERNAL MEDICINE

## 2025-03-14 RX ORDER — TRAMADOL HYDROCHLORIDE 50 MG/1
50 TABLET ORAL EVERY 6 HOURS
Qty: 21 TABLET | Refills: 0 | Status: CANCELLED | OUTPATIENT
Start: 2025-03-14

## 2025-03-14 RX ORDER — AZELAIC ACID 0.15 G/G
GEL TOPICAL
COMMUNITY
End: 2025-03-14

## 2025-03-14 RX ORDER — KETOROLAC TROMETHAMINE 10 MG/1
1 TABLET, FILM COATED ORAL EVERY 6 HOURS PRN
COMMUNITY

## 2025-03-14 RX ORDER — DICYCLOMINE HYDROCHLORIDE 10 MG/1
1 CAPSULE ORAL
COMMUNITY

## 2025-03-14 NOTE — TELEPHONE ENCOUNTER
IBD Provider: Dr. ROHAN Mcdaniel     - Allergies reviewed  - Rx pended for approval    - OV:  3/14

## 2025-03-14 NOTE — PROGRESS NOTES
"     [facility]             Inflammatory Bowel Disease   Follow-up  Note              TODAY'S VISIT DATE:  3/14/2025    Chief Complaint:   Chief Complaint   Patient presents with    Crohn's Disease     PCP: Martina Waters    Previous History:  Mini Marcus is a 20 y.o. with Crohn's disease (ileum), chronic ITP, chronic constipation (on linzess) who was doing well until hospitalization 4/30/2017 at which time she was diagnosed with acute ITP which responded to IVIG and later started on promacta 50 mg/d (family concerned that this worsened abd pain and discontinued 1/2021). In 1/2020 platelets were normal.  For generalized abd pain in 1/2021 pt had KUB and US that were normal and EGD significant for erythema in the duodenal bulb, moderate areas of erythema and nodularity in the stomach, normal esophagus (bx of esophagus normal, stomach HP neg chronic gastritis, duodenum normal) and colonoscopy significant for TI with few small ulcers and erythema (biopsies of colon normal and TI c/w focal erosive ileitis). At that time she was told she had "mild" case of Crohn's disease that did not require treatment and of not was not taking NSAIDs. Had spinal fusion 5/31/2019 and at that time had normal plts. She stayed on promacta varying doses of 25-75 mg/d from [id number]. Labs 1/2021 significant for anemia (hgb 8.5) and thrombocytopenia (plts 36-60k).  She was on nexium and carafate for abd pain with some improvement of symptoms though mom felt that promacta was cause and once discontinued this helped abdominal pain.  On 3/11/21 MRE c/w normal small bowel. In 3/2021 she reported mid abd pain worse with eating and ran out of nexium and taking carafate prn.  She had some mild weight loss with constipation (taking laxatives and recommended to add miralax and eventually started linzess).  Abdominal US 4/7/21 c/w hepatosplenomegaly and transabdominal pelvic US was normal.  On 5/5/21 VCE showed normal SB.  IBD " "panel 5/20/21 c/w myeloperoxidase abs neg, proteinase 3 Ab neg, CRP normal. On 8/27/21 pt had repeat pelvic US normal and doppler abd US c/w hepatosplenomegaly with mildly elevated velocities in the celiac artery that were felt to be incidental and not due to celiac artery stenosis.  On 9/2021 CTA c/w again hepatosplenomegaly and HIDA c/w normal GB EF.  In 10/2021 EGD was normal (on nexium) and colonoscopy "terminal ileum normal and scope carefully withdrawn throughout the colon. There was inflammation in the terminal ileum".  Biopsies of terminal ileum c/w patchy active chronic inflammation, normal colon and normal lower and upper esophagus, stomach with HP neg patchy mild superficial chronic gastritis, normal duodenum. Patient started azathioprine 100 mg/d (increased to 150 mg/d 1 month ago) for terminal ileitis and seen for f/u 12/27/21 with continued left sided abd pain and workup 12/29/21 with US normal and CT A/P hepatosplenomegaly.  Pt was referred for cholecystectomy but surgeon decided not clinically indicated and not recommended. In 9/2021 had IUD placed and removed and while she had it continued vaginal bleeding and KASHIF. She continued left side abdominal pain with no obvious cause. She continued on azathioprine 100 mg/day and 1 month ago it was increased to 150 mg/d. She also continues on linzess 72 mcg every other day for IBS-constipation.  On 1/3/22 labs Hgb 7.6, plts 251, normal CMP. On left side of abdomen, constant soreness with some worsening throughout the day triggered by foods (fried, heavy foods, pizza, salsa).  If she has no BM then may have bloating but no left sided abdominal pain. Patient is having 0-1 soft BMs/d, no blood. Low appetite and would avoid eating due to pain but since starting high dose prednisone appetite improved. Has nausea on "empty stomach."  Pt is currently on prednisone 40 mg po BID for ITP and started 14 day course on 4/14/22. Patient was hospitalized due to vaginal " bleeding and clot pushed IUD and so received IVIG and 1 unit PRBC 4/3 and had 2nd dose of IVIG 4/4/22 and also received TXA IV (clotting medicine) and this was in a setting of platelets 8 k. Pt had headache and imaging of head normal.  She had IVFs, reglan and pain meds.  I met patient for the first time in the IBD Clinic on 4/18/2022 at which time she continued on azathioprine 150 mg/day and linzess 72 mcg qod.  In 12/2022 patient underwent splenectomy for ITP though since then thrombocytosis and requiring aspirin to prevent clots.  In 4/2022 fecal calprotectin 49.5. IN 2/2023 colonoscopy showed liquid stool in entire colon with fair visualization and erosions with apthous ulcers in the terminal ileum and bx c/w moderate chronic active inflammation.  Colonsocopy 12/2023 significant for normal colon with 2 small TI apthous ulcers.  Due to effectiveness of stelara we discontinued azathioprine (likely not helping) in 3/2024. Her symptoms remained stable after stopping AZA. She continued to have chronic constipation. Was taking Linzess but stopped in mid 2024 due to insurance issues. Instead started taking miralax and dulcolax as needed. She has ongoing thrombocytosis but was not taking aspirin regularly due to concerns of NSAIDs and CD and I encouraged her to restart this advised to continue workup for this with hematology.    Interval History:  - current IBD meds: stelara 90 mg SC q 8 weeks (started 5/25/23-had headaches, LD 1/29, ND 3/26)  - other meds: dulcolax PRN for constipation, miralax half capful every day, bentyl PRN (started 9/2024)  - usually 0-1 hard BM a day; difficulty to pass; occasional blood on TP after straining; chronic abdominal pain   - yesterday AM woke up with severe abd pain (8 out of 10 on pain scale); had 2 episodes of diarrhea and 2 episodes of emesis triggered by the pain. Took bentyl and Toradol (prescribed by OB) without much relief; tried to eat later in the day, got nauseous and had  another episode of emesis. Since then has not had a BM or appetite and only able to tolerate broth and crackers; took some zofran which helped with nausea;   - denies any sick contacts or any possible food poisoning, has been eating home cooked meals all week  - abdominal pain started on LUQ - similar to pain she gets when constipated; but today has moved to RLQ, in the pelvic area. Reports never experiencing this kind of sharp pain before. Pain varies from 5 to a 8 out of 10.   - denies any fevers  - her menstrual cycle is very irregular since getting her IUD in 7/2024, but it is likely that she might be starting her cycle.  - 12/17/24 - colonoscopy: The colon (entire examined portion) appeared normal. One small linear ulcer and one small erosion in the terminal ileum with the remainder of the ileum examined normal. The inflammation was very mild in severity. Biopsies normal   - After colonoscopy pt has significant abdominal pain LLQ with guarding, no rebound and soft abdomen. Abd xray unrevealing. Pain improved prior to fentanyl given. Pt felt well on discharge and no further workup needed.   - 1/3/25 - CT A/P: Extensive postoperative changes in the spine. No acute abnormality is seen in the abdomen and pelvis  - 1/3/25-1/5/25 - admitted for bladder infection : got Rocephin in ER, then vancomycin since patient is asplenic and IVF. Blood and urine cultures with no growth upon discharge.   - 2/17/25 - URI - given steroids and amoxicillin - 2/19 chest xray was clear she prescribed another round of antibiotics and I was given a steroid shot - symptoms lasted three weeks and fully resolved last week   - no longer on linzess - did not feel that it was very helpful when using it  - anxiety - stable, on prozac and klonopin as needed,    - NSAID use: Yes - ketorolac PRN given by OBGYN for abd pain  - Narcotic use: No  - Alternative/complementary meds for IBD: No    Prior Pertinent Surgeries:   12/15/22 splenectomy for  ITP    Last pertinent Endoscopy/Imaging:  3/11/21 MRE c/w normal small bowel  5/5/21 VCE showed normal SB  8/2021 doppler abd US:  hepatosplenomegaly with mildly elevated velocities in the celiac artery that were felt to be incidental and not due to celiac artery stenosis.  On 9/2021 CTA c/w again hepatosplenomegaly and HIDA c/w normal GB EF  10/2021 EGD was normal. normal lower and upper esophagus, stomach with HP neg patchy mild superficial chronic gastritis, normal duodenum  12/29/21 US abdomen: hepatosplenomegaly   12/29/21 CT A/P:  borderline enlarged spleen.  12/2023 colonoscopy: normal colon and 2 small apthous ulcers in the TI. Biopsies of ileum c/w active enteritis which is mild with focal ulceration      Therapeutic Drug Monitoring Labs:  None    Prior IBD Therapies:  Azathioprine 150 mg/d    Vaccinations:  Lab Results   Component Value Date    HEPBSURFABQU POSITIVE 04/18/2022    HEPBSURFABQU 921 04/18/2022     Lab Results   Component Value Date    HEPAIGG Positive 04/18/2022     Lab Results   Component Value Date    VARICELLAZOS 3.37 (H) 04/18/2022    VARICELLAINT Positive (A) 04/18/2022     Lab Results   Component Value Date    MUMPSIGGSCRE 2.47 (H) 04/18/2022    MUMPSIGGINTE Positive (A) 04/18/2022      Lab Results   Component Value Date    RUBEOLAIGGAN 2.55 (H) 04/18/2022    RUBEOLAINTER Positive (A) 04/18/2022     Immunization History   Administered Date(s) Administered    DTaP 09/14/2005, 03/03/2008    DTaP / Hep B / IPV 2004, 2004, 2004    HIB 2004, 2004, 2004    HPV 9-Valent 11/07/2019, 02/26/2020, 07/09/2020    HiB PRP-T 06/07/2005, 05/17/2022    IPV 03/03/2008    Influenza - Quadrivalent - PF *Preferred* (6 months and older) 12/05/2014, 10/22/2016, 11/03/2017, 11/19/2018, 10/01/2021, 10/26/2023    Influenza - Trivalent - Afluria, Fluzone MDV 2004, 11/19/2018, 11/01/2019, 11/24/2020    Influenza - Trivalent - Fluarix, Flulaval, Fluzone, Afluria - PF  11/08/2005, 10/27/2006, 11/11/2008, 10/26/2009, 11/06/2010, 10/08/2011, 10/06/2012, 10/05/2013    MMR 06/07/2005    MMRV 03/03/2008    Meningococcal B, OMV 05/17/2022, 07/18/2022    Meningococcal Conjugate (MCV4P) 04/03/2015, 03/04/2020    Pneumococcal Conjugate - 13 Valent 05/17/2022    Pneumococcal Conjugate - 7 Valent 2004, 2004, 2004, 03/15/2005    Pneumococcal Polysaccharide - 23 Valent 07/18/2022    Tdap 04/03/2015    Varicella 03/15/2005    Zoster Recombinant 04/05/2023, 09/06/2023   Flu shot: recommended yearly   COVID vaccine/booster:  per CDC recommendations  RSV:  after age 51 yo  Tetanus (Tdap):  4/2025  PCV 20: 7/2027    Review of Systems   Constitutional:  Negative for fever.   Eyes:  Negative for pain and redness.   Cardiovascular:  Negative for chest pain.   Gastrointestinal:  Positive for abdominal pain. Negative for blood in stool, constipation, diarrhea, heartburn, nausea and vomiting.   Genitourinary:  Negative for hematuria.   Musculoskeletal:  Negative for back pain and joint pain.   Skin:  Negative for rash.   Psychiatric/Behavioral:  Negative for depression. The patient is not nervous/anxious.      All Medical History/Surgical History/Family History/Social History/Allergies have been reviewed and updated in EMR    Outpatient Medications Marked as Taking for the 3/14/25 encounter (Office Visit) with Bertram Mcdaniel MD   Medication Sig Dispense Refill    atomoxetine (STRATTERA) 40 MG capsule Take 1 capsule (40 mg total) by mouth once daily. 30 capsule 3    citalopram (CELEXA) 10 MG tablet Take 1 tablet (10 mg total) by mouth once daily. 30 tablet 2    clonazePAM (KLONOPIN) 0.5 MG tablet Take 1 tablet (0.5 mg total) by mouth daily as needed for Anxiety. 30 tablet 0    dicyclomine (BENTYL) 10 MG capsule Take 1 capsule by mouth 4 (four) times daily before meals and nightly.      fluticasone propionate (FLONASE) 50 mcg/actuation nasal spray 2 sprays by Each Nostril route.       "ketorolac (TORADOL) 10 mg tablet Take 1 tablet by mouth every 6 (six) hours as needed.      levonorgestreL (KYLEENA) 19.5 mg IUD       omeprazole (PRILOSEC) 20 MG capsule Take 20 mg by mouth as needed.      ondansetron (ZOFRAN-ODT) 4 MG TbDL Take 1 tablet (4 mg total) by mouth every 6 (six) hours as needed (Nausea). 20 tablet 1    QUEtiapine (SEROQUEL) 50 MG tablet Take 1 tablet (50 mg total) by mouth every evening. 30 tablet 5    sulfacetamide sodium-sulfur 10-5 % (w/w) Clsr APPLY a small amount to skin once a day]      ustekinumab (STELARA) 90 mg/mL Syrg syringe Inject 1 mL (90 mg total) into the skin every 8 weeks. 1 mL 0    ZILXI 1.5 % Foam Apply topically every evening.       Vital Signs:  /78 (BP Location: Left arm, Patient Position: Sitting)   Pulse 90   Temp 98.8 °F (37.1 °C) (Temporal)   Ht 5' 6" (1.676 m)   Wt 72.6 kg (160 lb 0.9 oz)   SpO2 98%   BMI 25.83 kg/m²    Physical Exam  Vitals and nursing note reviewed.   Constitutional:       Appearance: She is well-developed.   HENT:      Mouth/Throat:      Mouth: No oral lesions.   Eyes:      Conjunctiva/sclera: Conjunctivae normal.   Cardiovascular:      Rate and Rhythm: Normal rate and regular rhythm.   Pulmonary:      Effort: Pulmonary effort is normal.      Breath sounds: Normal breath sounds.   Abdominal:      General: Bowel sounds are normal. There is no distension.      Palpations: Abdomen is soft.      Tenderness: There is abdominal tenderness (RLQ).   Musculoskeletal:      Right lower leg: No swelling. No edema.      Left lower leg: No swelling. No edema.   Skin:     Findings: No rash.       Labs:   Lab Results   Component Value Date    CRP 0.4 03/14/2025    CALPROTECTIN <27.1 06/21/2023     Lab Results   Component Value Date    HEPBSAG Non-reactive 09/06/2024    HEPBCAB Non-reactive 09/06/2024     Lab Results   Component Value Date    TBGOLDPLUS Negative 09/06/2024     Lab Results   Component Value Date    XFABZEGZ45IY 32 04/18/2022    " FHLJOGMC88 337 09/06/2023     Lab Results   Component Value Date    WBC 10.16 03/14/2025    HGB 13.4 03/14/2025    HCT 40.5 03/14/2025    MCV 86 03/14/2025     (H) 03/14/2025     Lab Results   Component Value Date    CREATININE 0.9 03/14/2025    ALBUMIN 4.1 03/14/2025    BILITOT 0.2 03/14/2025    ALKPHOS 90 03/14/2025    AST 18 03/14/2025    ALT 32 03/14/2025     Assessment/Plan:  Mini Marcus is a 20 y.o. female with Crohn's disease (ileum), chronic constipation (on linzess), ITP S/P splenectomy with thromobocytosis, KASHIF, migraine HA.     Recent scope from 12/2024 was overall normal with very mild inflammation in the TI while on stelara. From a Crohn's perspective patient is stable, therefore, recommended she continues stelara 90mg every 8 weeks. Abdominal/pelvic pain reported today is unlikely appendicitis or GI related based on her presentation, but will check labs to rule infection. Encouraged pt to follow up with pelvic specialist and OBGYN to assess if source of pain related to reproductive system (ovarian cyst vs. displacement of IUD). In the meantime, she can take tramadol to help with the pain. Discussed the interaction between tramadol and klonopin, which she has not taken in a week. Advised not to take klonopin while taking tramadol due to increased risk of CNS depression. Patient verbalized understanding risks and will not plan to take klonopin at this time. Usually only uses as needed. If labs today are abnormal will proceed with CT scan of abdomen/pelvis.     # RLQ abdominal pain   - based on presentation and physical exam, unlikely the pain is GI related or appendicitis   - differentials: ovarian cyst vs. IUD displacement vs. cystitis  - UA today to rule out UTI  - HCG pregnancy test   - will check basic labs CBC CMP CRP   - stat CT Abd/pelvis - will cancel if labs are normal   - f/u with pelvic  on 3/18 and OBGYN for further evaluation  - can take tramadol as needed for pain.  Avoid klonopin while taking tramadol      # LUQ Abdominal pain- constipation  - chronic and related to certain food and constipation  - continue miralax daily and dulcolax as needed  - continue bentyl as needed - effective   - consider referral to lower motility specialists if ongoing but will defer for now    # Crohn's disease (ileum):  - note: thrombocytosis, risk of clots- ok to restart aspirin daily, followed by hematology closely   - continue stelara 90 mg SC q 8 weeks - if labs normal today proceed with ND as scheduled   - stool calprotectin- normal 6/2023  - colonoscopy- 12/2025- suprep, miralax daily 1 week prior, low residue diet, was not able to tolerate golytely in past  - drug monitoring labs: CBC/CMP q 6 mos (today), TPMT (normal 11/2021), Hep B testing (4/2022 HBcAb +/HBsAg/HBV DNA neg, 4/2023 repeat HBsAg/HBcAb neg, due 9/2025), TB quantiferon (9/2025)    # Heme issues- ITP S/P splenectomy and KASHIF  - followed by hematology closely and workup in progress for thrombocytosis- encouraged to restart aspirin to prevent clots    # Immunodeficiency due to long term immunosuppressive drug therapy and IBD specific health maintenance:  CRC risk- ileal disease, average risk  Skin exam yearly - normal 8/2024  Risk for osteopenia/osteoporosis- none  Pap smear yearly- will get one 3/2025  Vitamin D- normal, not on supplementation   Vaccines- S/P splenectomy, flu/covid vaccine recommended     I personally examined the patient and discussed above plan in collaboration with Zohreh Godwin, PharmD.       Visit today is associated with current or anticipated ongoing medical care related to this patient's single serious condition/complex condition crohn's disease in setting of splenectomy of ITP, abd pain.     Follow up in about 6 months (around 9/14/2025).    Bertram Mcdaniel MD  Department of Gastroenterology  Medical Director, Inflammatory Bowel Disease

## 2025-03-14 NOTE — Clinical Note
Note ready. I have pended the tramadol script that I am not able to send because it is a controlled substance. You might have another one pended from [facility]. Just SHRUTHI

## 2025-03-14 NOTE — TELEPHONE ENCOUNTER
Copied from CRM #[id number]. Topic: Appointments - Appointment Rescheduling  >> Mar 14, 2025 11:44 AM Leti wrote:  Pt is calling to speak to someone in the office to r/s her appt that she is currently scheduled for; on 3/14 @3pm no available appts in Epic. Please call to advise. [phone] (M)Thanks.     Patient's DX:     Additional Info:

## 2025-03-14 NOTE — PATIENT INSTRUCTIONS
- labs today (urinalysis, pregnancy test, CBC, CRP, CMP)  - schedule for CT scan later today or tomorrow AM   - if labs normal will cancel the scan   - continue stelara 90mg every 8 weeks - if labs are normal proceed with next dose

## 2025-03-15 RX ORDER — TRAMADOL HYDROCHLORIDE 50 MG/1
50 TABLET ORAL EVERY 8 HOURS PRN
Start: 2025-03-15 | End: 2025-03-19 | Stop reason: SDUPTHER

## 2025-03-16 PROBLEM — Z79.60 IMMUNODEFICIENCY DUE TO LONG TERM IMMUNOSUPPRESSIVE DRUG THERAPY: Status: ACTIVE | Noted: 2025-03-16

## 2025-03-16 PROBLEM — T45.1X5A IMMUNODEFICIENCY DUE TO LONG TERM IMMUNOSUPPRESSIVE DRUG THERAPY: Status: ACTIVE | Noted: 2025-03-16

## 2025-03-16 PROBLEM — Z87.440 HISTORY OF CYSTITIS: Status: ACTIVE | Noted: 2025-01-04

## 2025-03-16 PROBLEM — Z79.899 IMMUNODEFICIENCY DUE TO LONG TERM IMMUNOSUPPRESSIVE DRUG THERAPY: Status: ACTIVE | Noted: 2025-03-16

## 2025-03-16 PROBLEM — D84.821 IMMUNODEFICIENCY DUE TO LONG TERM IMMUNOSUPPRESSIVE DRUG THERAPY: Status: ACTIVE | Noted: 2025-03-16

## 2025-03-17 ENCOUNTER — TELEPHONE (OUTPATIENT)
Dept: GASTROENTEROLOGY | Facility: CLINIC | Age: 20
End: 2025-03-17
Payer: COMMERCIAL

## 2025-03-17 DIAGNOSIS — K50.00 CROHN'S DISEASE OF SMALL INTESTINE WITHOUT COMPLICATION: ICD-10-CM

## 2025-03-17 DIAGNOSIS — N30.01 ACUTE CYSTITIS WITH HEMATURIA: ICD-10-CM

## 2025-03-17 RX ORDER — USTEKINUMAB 90 MG/ML
90 INJECTION, SOLUTION SUBCUTANEOUS
Qty: 1 ML | Refills: 2 | Status: ACTIVE | OUTPATIENT
Start: 2025-03-17

## 2025-03-17 RX ORDER — TRAMADOL HYDROCHLORIDE 50 MG/1
50 TABLET ORAL EVERY 8 HOURS PRN
Qty: 21 TABLET
Start: 2025-03-17 | End: 2025-03-24

## 2025-03-17 NOTE — TELEPHONE ENCOUNTER
"Primary provider: Dr. Bertram Mcdaniel    IBD medications: stelara 90 mg SC q 8 weeks     Refill request for:      Pended Medication(s)   Requested Prescriptions     Pending Prescriptions Disp Refills    ustekinumab (STELARA) 90 mg/mL Syrg syringe 1 mL 0     Sig: Inject 1 mL (90 mg total) into the skin every 8 weeks.           Allergies reviewed: Yes    Drug Monitoring labs/frequency for all IBD meds:    CBC and CMP every 6 months  TB and Hep B every 12 months    Lab Results   Component Value Date    HEPBSAG Non-reactive 09/06/2024    HEPBCAB Non-reactive 09/06/2024     Lab Results   Component Value Date    TBGOLDPLUS Negative 09/06/2024     No results found for: "QUANTNILVALU", "QUANTIFERON", "QUANTTBGDPL"   No results found for: "TSPOTSCREN"  Lab Results   Component Value Date    HRZVCTNN87AP 32 04/18/2022    GLFBGZID08 337 09/06/2023     Lab Results   Component Value Date    WBC 10.16 03/14/2025    HGB 13.4 03/14/2025    HCT 40.5 03/14/2025    MCV 86 03/14/2025     (H) 03/14/2025     Lab Results   Component Value Date    CREATININE 0.9 03/14/2025    ALBUMIN 4.1 03/14/2025    BILITOT 0.2 03/14/2025    ALKPHOS 90 03/14/2025    AST 18 03/14/2025    ALT 32 03/14/2025       Lab due date (mo/yr):  9/2025    Labs scheduled: No - but patient has an OV before or when labs are due     Next appt: 9/19/2025    RX refill sent to provider for amount until next labs: Yes       "

## 2025-03-17 NOTE — TELEPHONE ENCOUNTER
----- Message from Dawit sent at 3/17/2025  1:45 PM CDT -----  Name of Caller: Nature of Call:Requesting a prescription Best Call Back Number: [phone] Additional Information:DOTTIE ANDRES calling requesting a prescription for medication  traMADoL (ULTRAM) 50 mg tablet .Pt stated she was told by the pharm that no prescription was sent over. The pt said she's in pain needs that medicine as soon as possible  Please call the pt back to inform if this is possible  
Rx pended for signature.   
I personally performed the service described in the documentation recorded by the scribe in my presence, and it accurately and completely records my words and actions.

## 2025-03-18 ENCOUNTER — PATIENT MESSAGE (OUTPATIENT)
Dept: GASTROENTEROLOGY | Facility: CLINIC | Age: 20
End: 2025-03-18
Payer: COMMERCIAL

## 2025-03-19 ENCOUNTER — PATIENT MESSAGE (OUTPATIENT)
Dept: GASTROENTEROLOGY | Facility: CLINIC | Age: 20
End: 2025-03-19
Payer: COMMERCIAL

## 2025-03-19 DIAGNOSIS — N30.01 ACUTE CYSTITIS WITH HEMATURIA: ICD-10-CM

## 2025-03-19 RX ORDER — TRAMADOL HYDROCHLORIDE 50 MG/1
50 TABLET ORAL EVERY 8 HOURS PRN
Start: 2025-03-19 | End: 2025-03-19 | Stop reason: SDUPTHER

## 2025-03-19 RX ORDER — TRAMADOL HYDROCHLORIDE 50 MG/1
50 TABLET ORAL EVERY 8 HOURS PRN
Qty: 21 TABLET | Refills: 0 | Status: SHIPPED | OUTPATIENT
Start: 2025-03-19 | End: 2025-03-26

## 2025-03-19 NOTE — TELEPHONE ENCOUNTER
IBD Provider: Dr. ROHAN Mcdaniel     - Allergies reviewed  - Rx pended for approval    - OV: 9/19/25

## 2025-03-25 DIAGNOSIS — F41.1 GAD (GENERALIZED ANXIETY DISORDER): ICD-10-CM

## 2025-03-25 RX ORDER — CLONAZEPAM 0.5 MG/1
0.5 TABLET ORAL DAILY PRN
Qty: 30 TABLET | Refills: 0 | Status: SHIPPED | OUTPATIENT
Start: 2025-03-25 | End: 2026-03-25

## 2025-04-08 ENCOUNTER — TELEPHONE (OUTPATIENT)
Dept: GASTROENTEROLOGY | Facility: CLINIC | Age: 20
End: 2025-04-08
Payer: COMMERCIAL

## 2025-04-11 ENCOUNTER — LAB VISIT (OUTPATIENT)
Dept: LAB | Facility: HOSPITAL | Age: 20
End: 2025-04-11
Payer: COMMERCIAL

## 2025-04-11 ENCOUNTER — PATIENT MESSAGE (OUTPATIENT)
Dept: INFUSION THERAPY | Facility: HOSPITAL | Age: 20
End: 2025-04-11
Payer: COMMERCIAL

## 2025-04-11 DIAGNOSIS — D50.0 IRON DEFICIENCY ANEMIA DUE TO CHRONIC BLOOD LOSS: ICD-10-CM

## 2025-04-11 DIAGNOSIS — D75.839 THROMBOCYTOSIS: ICD-10-CM

## 2025-04-11 DIAGNOSIS — Z86.2 HISTORY OF ITP: ICD-10-CM

## 2025-04-11 DIAGNOSIS — N92.1 MENORRHAGIA WITH IRREGULAR CYCLE: ICD-10-CM

## 2025-04-11 LAB
ABSOLUTE EOSINOPHIL (OHS): 0.32 K/UL
ABSOLUTE MONOCYTE (OHS): 1.22 K/UL (ref 0.3–1)
ABSOLUTE NEUTROPHIL COUNT (OHS): 7.28 K/UL (ref 1.8–7.7)
BASOPHILS # BLD AUTO: 0.11 K/UL
BASOPHILS NFR BLD AUTO: 0.9 %
ERYTHROCYTE [DISTWIDTH] IN BLOOD BY AUTOMATED COUNT: 13.5 % (ref 11.5–14.5)
FERRITIN SERPL-MCNC: 71 NG/ML (ref 20–300)
HCT VFR BLD AUTO: 43.2 % (ref 37–48.5)
HGB BLD-MCNC: 14.1 GM/DL (ref 12–16)
IMM GRANULOCYTES # BLD AUTO: 0.14 K/UL (ref 0–0.04)
IMM GRANULOCYTES NFR BLD AUTO: 1.1 % (ref 0–0.5)
IRON SATN MFR SERPL: 26 % (ref 20–50)
IRON SERPL-MCNC: 101 UG/DL (ref 30–160)
LYMPHOCYTES # BLD AUTO: 3.45 K/UL (ref 1–4.8)
MCH RBC QN AUTO: 27.9 PG (ref 27–31)
MCHC RBC AUTO-ENTMCNC: 32.6 G/DL (ref 32–36)
MCV RBC AUTO: 85 FL (ref 82–98)
NUCLEATED RBC (/100WBC) (OHS): 0 /100 WBC
PLATELET # BLD AUTO: 634 K/UL (ref 150–450)
PMV BLD AUTO: 8.3 FL (ref 9.2–12.9)
RBC # BLD AUTO: 5.06 M/UL (ref 4–5.4)
RELATIVE EOSINOPHIL (OHS): 2.6 %
RELATIVE LYMPHOCYTE (OHS): 27.6 % (ref 18–48)
RELATIVE MONOCYTE (OHS): 9.7 % (ref 4–15)
RELATIVE NEUTROPHIL (OHS): 58.1 % (ref 38–73)
TIBC SERPL-MCNC: 388 UG/DL (ref 250–450)
TRANSFERRIN SERPL-MCNC: 262 MG/DL (ref 200–375)
WBC # BLD AUTO: 12.52 K/UL (ref 3.9–12.7)

## 2025-04-11 PROCEDURE — 82728 ASSAY OF FERRITIN: CPT

## 2025-04-11 PROCEDURE — 84466 ASSAY OF TRANSFERRIN: CPT

## 2025-04-11 PROCEDURE — 85025 COMPLETE CBC W/AUTO DIFF WBC: CPT

## 2025-04-11 PROCEDURE — 36415 COLL VENOUS BLD VENIPUNCTURE: CPT | Mod: PO

## 2025-04-17 ENCOUNTER — OFFICE VISIT (OUTPATIENT)
Dept: HEMATOLOGY/ONCOLOGY | Facility: CLINIC | Age: 20
End: 2025-04-17
Payer: COMMERCIAL

## 2025-04-17 DIAGNOSIS — Z86.2 HISTORY OF ITP: Primary | ICD-10-CM

## 2025-04-17 DIAGNOSIS — D75.839 THROMBOCYTOSIS: ICD-10-CM

## 2025-04-17 NOTE — PROGRESS NOTES
Subjective:       Patient ID: Mini Marcus is a 20 y.o. female.    Chief Complaint: review labs.     HPI:a 20 y.o. female with Chron's disease (routine GI follow up. Colonoscopy UTD), presenting for follow-up with history of iron deficiency anemia and chronic ITP now s/p splenectomy and thrombocytosis. H/o menorrhagia.     Not on oral iron due to intolerance. H/o IV iron with Benadryl premedication due to preference despite discussion of risks associated.     In regards to her ITP, Several prior treatments for ITP including IVIG, steroids, Nplate and in December 2022 underwent splenectomy; she is had post splenectomy complications including recurrent infection/strep throat. She has had follow-up with Infectious Disease for post splenectomy vaccinations. She is already seen allergy and immunology no clear immunoglobulin deficiency or history of IVIG.  She also reports follow up with ENT with plans for possible tonsillectomy given recurrent strep throat. Now s/p tonsillectomy 12/2023     Of note patient had bmbx 5/2022 for evaluation of persistent thrombocytopenia. It showed a normocellular marrow (80% total cellularity) with no increased blasts and trilineage hematopoiesis with left-shifted granulopoiesis, minimal erythroid hyperplasia, and marked megakaryocytic hyperplasia without significant dysplasia .      Patient has had recent persistent thrombocytosis thought likely to be multifactorial status post splenectomy, inflammation with Crohn's disease and or iron deficiency.     Today she presents for follow up of her iron deficiency and thrombocytosis. Today she notes interval dx PCOS. Having issues with IUD attempted removal today unsuccessful. She notes currently undergoing evaluation for elevated prolactin level.  Social History     Socioeconomic History    Marital status: Single   Tobacco Use    Smoking status: Never    Smokeless tobacco: Never   Substance and Sexual Activity    Alcohol use: Never    Drug  use: Never    Sexual activity: Never     Social Drivers of Health     Financial Resource Strain: Medium Risk (1/4/2025)    Overall Financial Resource Strain (CARDIA)     Difficulty of Paying Living Expenses: Somewhat hard   Food Insecurity: Unknown (1/4/2025)    Hunger Vital Sign     Worried About Running Out of Food in the Last Year: Patient declined     Ran Out of Food in the Last Year: Never true   Transportation Needs: Patient Declined (1/4/2025)    TRANSPORTATION NEEDS     Transportation : Patient declined   Physical Activity: Insufficiently Active (1/4/2025)    Exercise Vital Sign     Days of Exercise per Week: 2 days     Minutes of Exercise per Session: 40 min   Stress: Stress Concern Present (1/4/2025)    [employer] - Occupational Stress Questionnaire     Feeling of Stress : Very much   Housing Stability: Unknown (1/4/2025)    Housing Stability Vital Sign     Unable to Pay for Housing in the Last Year: No     Homeless in the Last Year: Patient declined       Past Medical History:   Diagnosis Date    ADHD (attention deficit hyperactivity disorder) evaluation 04/03/2022    Chronic constipation     Crohn's disease (ileum)     VELASQUEZ (generalized anxiety disorder) 01/03/2022    Formatting of this note might be different from the original.  Last Assessment & Plan:   Formatting of this note might be different from the original.  Has anxiety surrounding food and subsequent abdominal pain  Will start zoloft 25mg po daily -- will wait until CBC in 2 weeks to make sure plt tolerate the wean in Nplate before starting Zoloft.   Also recommend out patient counseling -- planning t    GERD (gastroesophageal reflux disease)     History of cystitis 01/04/2025    History of ITP S/P splenectomy 05/31/2022    History of transfusion reaction 03/03/2022    Formatting of this note might be different from the original.  Pre-med with tylenol and benadryl     Last Assessment & Plan:    Formatting of this note might be different from the original.  Will premed with tylenol and benadryl before next weeks blood transfusion      Immunodeficiency due to long term immunosuppressive drug therapy 03/16/2025    Long-term current use of intravenous immunoglobulin (IVIG)     PONV (postoperative nausea and vomiting)     Seasonal allergic rhinitis due to pollen 08/03/2017    Last Assessment & Plan:   -not taking flonase at this time  -ok to use if patient would like to continue therapy      Sinusitis 02/26/2024    Thrombocytosis 01/18/2024       Family History   Problem Relation Name Age of Onset    No Known Problems Mother      Asthma Father      Hypertension Father      Gout Father      No Known Problems Brother      Hyperlipidemia Maternal Grandmother      Diabetes Paternal Grandmother      Hypertension Paternal Grandfather      No Known Problems Brother         Past Surgical History:   Procedure Laterality Date    CHOLECYSTECTOMY      patient denies this procedure    COLONOSCOPY N/A 02/13/2023    Procedure: COLONOSCOPY;  Surgeon: Bertram Mcdaniel MD;  Location: [facility] ENDO (4TH FLR);  Service: Endoscopy;  Laterality: N/A;  inst via portal  precall confirmed 2/6 EB    COLONOSCOPY N/A 12/19/2023    Procedure: COLONOSCOPY;  Surgeon: Bertram Mcdaniel MD;  Location: [facility] ENDO (4TH FLR);  Service: Endoscopy;  Laterality: N/A;  Ref By: Dr. JENNY Mcdaniel, Sergio, instr. ent via portal. AC  Prep Specifications:Standard prep - - miralax daily starting 1 week prior and low residue diet week before, Suprep  12/12-lvm for precall-MS  12/12-pt confirmed appt-KPvt    COLONOSCOPY N/A 12/17/2024    Procedure: COLONOSCOPY;  Surgeon: Bertram Mcdaniel MD;  Location: [facility] ENDO (4TH FLR);  Service: Endoscopy;  Laterality: N/A;  Ref By:,joseph,[email or web address].AC  12/10 suprep sent / inst with updated NPO times to portal / precall complete - LW    INTRALUMINAL GASTROINTESTINAL TRACT IMAGING VIA CAPSULE N/A 05/05/2021     Procedure: IMAGING PROCEDURE, GI TRACT, INTRALUMINAL, VIA CAPSULE;  Surgeon: Mitchel Humphries RN;  Location: [facility];  Service: Endoscopy;  Laterality: N/A;    ROBOT-ASSISTED SURGICAL REMOVAL OF SPLEEN USING DA MARYANA XI N/A 12/15/2022    Procedure: XI ROBOTIC SPLENECTOMY;  Surgeon: Mitchel Simmons Jr., MD;  Location: [facility] OR [address] [place] [address];  Service: General;  Laterality: N/A;  CONSENT IN AM    SPINE SURGERY      rods    SPLENECTOMY      12/16/2022    TONSILLECTOMY, ADENOIDECTOMY      TONSILLECTOMY, ADENOIDECTOMY Bilateral 12/26/2023    Procedure: TONSILLECTOMY AND ADENOIDECTOMY;  Surgeon: Keisha Ventura MD;  Location: [facility] OR;  Service: ENT;  Laterality: Bilateral;  send tonsils for path       Review of Systems   Constitutional:  Negative for appetite change, chills, fatigue, fever and unexpected weight change.   HENT:  Negative for congestion, mouth sores, nosebleeds, sore throat, trouble swallowing and voice change.    Respiratory:  Negative for cough, chest tightness, shortness of breath and wheezing.    Cardiovascular:  Negative for chest pain and leg swelling.   Gastrointestinal:  Negative for abdominal distention, abdominal pain, blood in stool, constipation, diarrhea, nausea and vomiting.   Genitourinary:  Negative for difficulty urinating, dysuria and hematuria.   Musculoskeletal:  Negative for arthralgias, back pain and myalgias.   Skin:  Negative for pallor, rash and wound.   Allergic/Immunologic: Positive for immunocompromised state.   Neurological:  Negative for dizziness, syncope, weakness and headaches.   Hematological:  Negative for adenopathy. Does not bruise/bleed easily.   Psychiatric/Behavioral:  The patient is nervous/anxious.          Medication List with Changes/Refills   Current Medications    ASPIRIN 81 MG CAP    Take 81 mg by mouth once daily.    ATOMOXETINE (STRATTERA) 40 MG CAPSULE    Take 1 capsule (40 mg total) by mouth once daily.    CITALOPRAM (CELEXA) 10 MG TABLET    Take 1  tablet (10 mg total) by mouth once daily.    CLONAZEPAM (KLONOPIN) 0.5 MG TABLET    Take 1 tablet (0.5 mg total) by mouth daily as needed for Anxiety.    DICYCLOMINE (BENTYL) 10 MG CAPSULE    Take 1 capsule by mouth 4 (four) times daily before meals and nightly.    KETOROLAC (TORADOL) 10 MG TABLET    Take 1 tablet by mouth every 6 (six) hours as needed.    LEVONORGESTREL (KYLEENA) 19.5 MG IUD        OMEPRAZOLE (PRILOSEC) 20 MG CAPSULE    Take 20 mg by mouth as needed.    ONDANSETRON (ZOFRAN-ODT) 4 MG TBDL    Take 1 tablet (4 mg total) by mouth every 6 (six) hours as needed (Nausea).    QUETIAPINE (SEROQUEL) 50 MG TABLET    Take 1 tablet (50 mg total) by mouth every evening.    SULFACETAMIDE SODIUM-SULFUR 10-5 % (W/W) CLSR    APPLY a small amount to skin once a day]    USTEKINUMAB (STELARA) 90 MG/ML SYRG SYRINGE    Inject 1 mL (90 mg total) into the skin every 8 weeks.    ZILXI 1.5 % FOAM    Apply topically every evening.     Objective:     There were no vitals filed for this visit.    Lab Results   Component Value Date    WBC 12.52 04/11/2025    HGB 14.1 04/11/2025    HCT 43.2 04/11/2025    MCV 85 04/11/2025     (H) 04/11/2025       BMP  Lab Results   Component Value Date     03/14/2025    K 3.8 03/14/2025     03/14/2025    CO2 24 03/14/2025    BUN 12 03/14/2025    CREATININE 0.9 03/14/2025    CALCIUM 9.1 03/14/2025    ANIONGAP 9 03/14/2025    EGFRNORACEVR >60.0 03/14/2025     Lab Results   Component Value Date    ALT 32 03/14/2025    AST 18 03/14/2025    ALKPHOS 90 03/14/2025    BILITOT 0.2 03/14/2025         Physical Exam  Pulmonary:      Effort: Pulmonary effort is normal. No respiratory distress.   Neurological:      Mental Status: She is alert and oriented to person, place, and time.        Assessment:     Problem List Items Addressed This Visit          Hematology    History of ITP S/P splenectomy - Primary    S/p splenectomy having had failed several prior treatments. Now with  thrombocytosis     Most recent CBC with stable thrombocytosis.     Jak2, MPN, BCR-ABL all negative.     Of note prior bmbx 5/2022 5/31/22 for evaluation of thrombocytopenia. It showed a normocellular marrow (80% total cellularity) with no increased blasts and trilineage hematopoiesis with left-shifted granulopoiesis, minimal erythroid hyperplasia, and marked megakaryocytic hyperplasia without significant dysplasia     Stable thrombocytosis secondary to splenectomy. Continue ASA 81 mg PO daily. F/u 6 months with cbc iron ferritin 1-2 days prior.             Oncology    Thrombocytosis    H/o ITP now with thrombocytosis s/p splenectomy. Myeloproliferative workup negative. Most likely reactive to splenectomy. Continue to monitor. Continue ASA 81 mg PO daily. Consider repeat bone marrow biopsy if significant progression from baseline 500 range              Plan:     History of ITP S/P splenectomy    Thrombocytosis            Med Onc Chart Routing      Follow up with physician    Follow up with JOSEPH 6 months.   Infusion scheduling note    Injection scheduling note    Labs CBC, CMP, ferritin and iron and TIBC   Scheduling:  Preferred lab:  Lab interval:  1-2 days prior   Imaging None      Pharmacy appointment No pharmacy appointment needed      Other referrals       No additional referrals needed       CAM Morales      Audio Only Telehealth Visit     The patient location is: [place]  The chief complaint leading to consultation is: thrombocytosis   Visit type: Virtual visit with audio only (telephone)  Total time spent in medical discussion with patient: 11 minutes  Total time spent on date of the encounter:25 minutes       The reason for the audio only service rather than synchronous audio and video virtual visit was related to technical difficulties or patient preference/necessity.       Each patient to whom I provide medical services by telemedicine is:  (1) informed of the relationship between the physician and  patient and the respective role of any other health care provider with respect to management of the patient; and (2) notified that they may decline to receive medical services by telemedicine and may withdraw from such care at any time. Patient verbally consented to receive this service via voice-only telephone call.                             This service was not originating from a related E/M service provided within the previous 7 days nor will  to an E/M service or procedure within the next 24 hours or my soonest available appointment.  Prevailing standard of care was able to be met in this audio-only visit.

## 2025-04-17 NOTE — ASSESSMENT & PLAN NOTE
Continued Stay Note  [facility]     Patient Name: Geeta Butt  MRN: [id number]  Today's Date: 2021    Admit Date: 2021    Discharge Plan     Row Name [id number]       Plan    Plan Comments  [facility] pending pre-cert.  She will initiate pre-cert after OR and she plans to reach out to the daughter.  Carina Alvarez RN    Row Name [id number]       Plan    Plan  Return home    Patient/Family in Agreement with Plan  yes    Plan Comments  Spoke with patient and daughter Daisy Avila [phone] at bedside.  Patient lives alone (spouse  ~3 weeks ago), is IADL, she does have a walker and grab bars in the BR.  She has used [facility] in the past and has never been to SNF.  PCP is Dr. Grzegorz Nguyen and pharmacy is [facility] on [address].  Patient is aware she will likely need SNF.  She is interested in [facility] - message to [facility], [facility] [place] - message to [facility] and [facility] - message to Dan[facility].  Plan for OR tomorrow.  CCP will F/U after OR.  BHumeniuk RN        Discharge Codes    No documentation.             Carina Alvarez, TRUDY     S/p splenectomy having had failed several prior treatments. Now with thrombocytosis     Most recent CBC with stable thrombocytosis.     Jak2, MPN, BCR-ABL all negative.     Of note prior bmbx 5/2022 5/31/22 for evaluation of thrombocytopenia. It showed a normocellular marrow (80% total cellularity) with no increased blasts and trilineage hematopoiesis with left-shifted granulopoiesis, minimal erythroid hyperplasia, and marked megakaryocytic hyperplasia without significant dysplasia     Stable thrombocytosis secondary to splenectomy. Continue ASA 81 mg PO daily. F/u 6 months with cbc iron ferritin 1-2 days prior.

## 2025-04-25 ENCOUNTER — TELEPHONE (OUTPATIENT)
Dept: GASTROENTEROLOGY | Facility: CLINIC | Age: 20
End: 2025-04-25
Payer: COMMERCIAL

## 2025-05-01 ENCOUNTER — APPOINTMENT (OUTPATIENT)
Dept: LAB | Facility: HOSPITAL | Age: 20
End: 2025-05-01
Payer: COMMERCIAL

## 2025-05-01 DIAGNOSIS — F41.1 GENERALIZED ANXIETY DISORDER: ICD-10-CM

## 2025-05-01 DIAGNOSIS — F90.0 ADHD, PREDOMINANTLY INATTENTIVE TYPE: ICD-10-CM

## 2025-05-01 DIAGNOSIS — Z79.899 DRUG THERAPY: Primary | ICD-10-CM

## 2025-05-01 LAB
AMPHET UR QL SCN: NEGATIVE
BARBITURATE SCN PRESENT UR: NEGATIVE
BENZODIAZ UR QL SCN: NEGATIVE
CANNABINOIDS UR QL SCN: NEGATIVE
COCAINE UR QL SCN: NEGATIVE
CREAT UR-MCNC: 140 MG/DL (ref 15–325)
ETHANOL UR-MCNC: <10 MG/DL
METHADONE UR QL SCN: NEGATIVE
OPIATES UR QL SCN: NEGATIVE
PCP UR QL: NEGATIVE

## 2025-05-01 PROCEDURE — 80307 DRUG TEST PRSMV CHEM ANLYZR: CPT
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
## 2025-07-02 ENCOUNTER — OFFICE VISIT (OUTPATIENT)
Dept: PSYCHIATRY | Facility: CLINIC | Age: 20
End: 2025-07-02
Payer: COMMERCIAL

## 2025-07-02 DIAGNOSIS — Z13.39 ADHD (ATTENTION DEFICIT HYPERACTIVITY DISORDER) EVALUATION: ICD-10-CM

## 2025-07-02 DIAGNOSIS — F06.31 DEPRESSION DUE TO PHYSICAL ILLNESS: Primary | ICD-10-CM

## 2025-07-02 DIAGNOSIS — F41.1 GAD (GENERALIZED ANXIETY DISORDER): ICD-10-CM

## 2025-07-02 NOTE — PROGRESS NOTES
PSYCHO-ONCOLOGY INTAKE    Diagnostic Interview - CPT 57572    Date: 7/2/2025  Site: [facility]    Evaluation Length (direct face-to-face time):  1 hour    This includes face to face time and non-face to face time preparing to see the patient, obtaining and/or reviewing separately obtained history, documenting clinical information in the electronic or other health record, independently interpreting results and communicating results to the patient/family/caregiver, or care coordinator.     Referral Source: Self, Aaareferral   Oncologist:   PCP: Martina Waters NP    Clinical status of patient: Outpatient    Mini Marcus, a 20 y.o. female, seen for initial evaluation visit.    Mini Marcus reviewed and agreed to informed consent and the limits of confidentiality.    Chief complaint/reason for encounter: adjustment to illness, anxiety    History of Present Illness:     Medical/Surgical History:    Patient Active Problem List   Diagnosis    Adolescent idiopathic scoliosis of thoracolumbar region    Menorrhagia with irregular cycle    Classical migraine with intractable migraine with aura    Seasonal allergic rhinitis due to pollen    Crohn's disease (ileum)    ADHD (attention deficit hyperactivity disorder) evaluation    History of ITP S/P splenectomy    Drug-induced insomnia    Adrenal cortical steroids causing adverse effect in therapeutic use    VELASQUEZ (generalized anxiety disorder)    History of transfusion reaction    H/O splenectomy    Numbness and tingling in left hand    Thrombocytosis    Sinusitis    History of cystitis    Immunodeficiency due to long term immunosuppressive drug therapy       Health Behaviors:       ETOH Use: Endorsed: social use; typically has 2-3 drinks        Tobacco Use: Endorsed: vaping    Illicit Drug Use:  Denied      Prescription Misuse: Denied    Caffeine: minimal   Exercise:The patient is actively working to return to baseline exercise tolerance.   Firearms:  Endorsed: father's and  stored in a gun safe    Advanced directives: Denied     Family History:   Psychiatric illness: Dad: PTSD; Mom: anxiety     Alcohol/Drug Abuse: Undiagnosed but suspected alcohol abuse throughout family    Suicide: Denied       Past Psychiatric History:   Inpatient treatment: Denied     Outpatient treatment: Endorsed: Dr. Blanc and Ortiz Durán    Prior substance abuse treatment: Denied     Suicide Attempts: Denied      Psychotropic Medications:        Past: Wellbutrin and Zoloft and Adderall and Prozac     Current medications as per below, allergies reviewed in chart.    Current Outpatient Medications   Medication    aspirin 81 mg Cap    atomoxetine (STRATTERA) 40 MG capsule    citalopram (CELEXA) 10 MG tablet    clonazePAM (KLONOPIN) 0.5 MG tablet    dicyclomine (BENTYL) 10 MG capsule    ketorolac (TORADOL) 10 mg tablet    levonorgestreL (KYLEENA) 19.5 mg IUD    omeprazole (PRILOSEC) 20 MG capsule    ondansetron (ZOFRAN-ODT) 4 MG TbDL    QUEtiapine (SEROQUEL) 50 MG tablet    sulfacetamide sodium-sulfur 10-5 % (w/w) Clsr    ustekinumab (STELARA) 90 mg/mL Syrg syringe    ZILXI 1.5 % Foam     No current facility-administered medications for this visit.        Social situation/Stressors: Mini Marcus lives with her parents and twin brothers in [place], [place].  She is a full-time full-time student studying social work at [employer]. Additionally, she works in the evenings at a local restaurant.   Mini Marcus is in a serious long-term relationship. The patient reports good social support; however feels misunderstood at times by her struggles.  Mini Marcus is an active member of the [employer].      Strengths:Housing stability, Able to vocalize needs, Motivation, readiness for change, Setting and pursuing goals, hopes, dreams, aspirations, and Resources - social, interpersonal, monetary  Liabilities: Complicated medical illness    Current Evaluation:     Mental Status Exam: Mini Marcus arrived  promptly for the assessment session.  The patient was fully cooperative throughout the interview and was an adequate historian   Appearance: age appropriate, appropriately  dressed, adequately  groomed  Behavior/Cooperation: friendly and cooperative  Speech: normal in rate, volume, and tone and appropriate quality, quantity and organization of sentences  Mood: steady but tearful when discussing distressing topics   Affect: mood congruent  Thought Process: goal-directed, logical  Thought Content: normal, no suicidality, no homicidality, delusions, or paranoia;did not appear to be responding to internal stimuli during the interview.   Orientation: grossly intact  Memory: grossly intact  Attention Span/Concentration: Attends to interview without distraction; reports subjective difficulty  Fund of Knowledge: average  Estimate of Intelligence: average from verbal skills and history  Cognition: grossly intact  Insight: patient has awareness of illness; good insight into own behavior and behavior of others  Judgment: the patient's behavior is adequate to circumstances      Adjustment Assessment to Current Illness:      Mini Marcus has adjusted to illness with moderate difficulty primarily through active coping strategies, focus on alternative activities, focus on family, and prayer. Pt has a history of ITP diagnosed in middle school and has managed symptoms throughout her life. She was recently diagnosed with PCOS and described her physical therapy sessions as emotionally taxing and physically exhausting. Pt reported ongoing challenges with body image, which have contributed to impairments in social engagement and interpersonal functioning. She has engaged in appropriate information gathering.  The patient has good family/friend support.  Her support system is coping well with the diagnosis/treatment/prognosis. Illness-related psychosocial stressors include absence from work, difficulty meeting family responsibilities,  changes in fertility options , changes in ability to engage in leisure activities, limitations on sexual interactions, absence from home, and absence from school.  The patient has a good partnership with her [facility] treatment team. The patient reports the following barriers to cancer care:none.     NCCN Distress thermometer:       10/1/2024     9:56 AM 6/25/2024     2:00 PM 3/21/2024     2:19 PM 1/18/2024     2:39 PM 10/12/2023     2:26 PM 7/17/2023    11:27 AM 5/16/2023     2:20 PM   DISTRESS SCREENING   Distress Score 0 - No Distress 4 5 0 - No Distress 4 0 - No Distress 1   Practical Concerns School;Finances  Work;School  School;Finances  None of these   None of these  None of these    Social Concerns None of these None of these None of these None of these  None of these  None of these    Emotional Concerns Worry or anxiety Worry or anxiety Worry or anxiety None of these Fears;Nervousness;Worry  None of these  None of these    Retire Spiritual or Holiness Concerns      No  No    Spiritual or Holiness Concerns None of these None of these None of these None of these      Physical Concerns Pain;Sleep;Fatigue;Memory or concentration Sleep;Fatigue;Memory or concentration Fatigue None of these  Fatigue;Constipation;Sleep  Fatigue;Constipation        Data saved with a previous flowsheet row definition        Symptoms:   Mood: depressed mood, diminished interest, fatigue, worthlessness/guilt, poor concentration, tearfulness, and social isolation;  prior depression:2022 due to chronic illness; no SI/HI  Anxiety: Feeling nervous, anxious, or on edge, Uncontrollable worry (about health), Excessive worry (interfering with social and interpersonal), Difficulty relaxing, Restlessness, Irritability, and Fear of unknown; prior anxiety:2019  Substance abuse: denied  Cognitive functioning: subjective challenges with concentration. Recently diagnosed with ADHD being managed with psychiatry medication management.    Health behaviors: Engages in regular vaping.         7/3/2025   PHQ-9 Depression Patient Health Questionnaire   Over the last two weeks how often have you been bothered by little interest or pleasure in doing things 2   Over the last two weeks how often have you been bothered by feeling down, depressed or hopeless 2   Over the last two weeks how often have you been bothered by trouble falling or staying asleep, or sleeping too much 2   Over the last two weeks how often have you been bothered by feeling tired or having little energy 2   Over the last two weeks how often have you been bothered by a poor appetite or overeating 1   Over the last two weeks how often have you been bothered by feeling bad about yourself - or that you are a failure or have let yourself or your family down 3   Over the last two weeks how often have you been bothered by trouble concentrating on things, such as reading the newspaper or watching television 1   Over the last two weeks how often have you been bothered by moving or speaking so slowly that other people could have noticed. 0   Over the last two weeks how often have you been bothered by thoughts that you would be better off dead, or of hurting yourself 0   If you checked off any problems, how difficult have these problems made it for you to do your work, take care of things at home or get along with other people? Somewhat difficult   PHQ-9 Score 13          7/3/2025    10:20 AM   VELASQUEZ-7   Was test performed? Yes   1. Feeling nervous, anxious, or on edge? More than half the days   2. Not being able to stop or control worrying? More than half the days   3. Worrying too much about different things? More than half the days   4. Trouble relaxing? More than half the days   5. Being so restless that it is hard to sit still? Several days   6. Becoming easily annoyed or irritable? Several days   7. Feeling afraid as if something awful might happen? Several days   8. If you checked off any  "problems, how difficult have these problems made it for you to do your work, take care of things at home, or get along with other people? Somewhat difficult   VELASQUEZ-7 Score 11   Number answered (out of first 7) 7   Interpretation Moderate Anxiety         Assessment - Diagnosis - Goals:       ICD-10-CM ICD-9-CM   1. Depression due to physical illness  F06.31 293.83   2. VELASQUEZ (generalized anxiety disorder)  F41.1 300.02   3. ADHD (attention deficit hyperactivity disorder) evaluation  Z13.39 V79.8         Plan:individual psychotherapy and medication management by physician    Summary and Recommendations  Mini Marcus is a 20 y.o. female referred by Self, Aaareferral for psychological evaluation and treatment.  Ms. Marcus appears to be having moderate difficulty coping with her diagnosis and proposed treatment course.  Pt reported an increase in depressive symptoms related to ongoing challenges in managing PCOS. Since initiating physical therapy, she described experiencing a sense of emotional numbness and frustration with persistent physical symptoms. Although she continues to engage in adaptive coping strategies, she expressed that they no longer feel effective. ACT and narrative therapy approaches were recommended to address ongoing mood-related concerns. Pt was informed of this plan and intends to follow up for continued support.    Return to clinic: 3 weeks    GOALS:   "A place to emotional process and express my journey."   Reduce negative self-talk       Mary Bliss Psy.D.   Clinical Health Psychology Fellow            "

## 2025-07-09 ENCOUNTER — PATIENT MESSAGE (OUTPATIENT)
Dept: ADMINISTRATIVE | Facility: OTHER | Age: 20
End: 2025-07-09
Payer: COMMERCIAL

## 2025-07-10 ENCOUNTER — TELEPHONE (OUTPATIENT)
Dept: GASTROENTEROLOGY | Facility: CLINIC | Age: 20
End: 2025-07-10
Payer: COMMERCIAL

## 2025-07-10 PROBLEM — E28.2 PCOS (POLYCYSTIC OVARIAN SYNDROME): Status: ACTIVE | Noted: 2025-07-10

## 2025-07-10 NOTE — TELEPHONE ENCOUNTER
Copied from CRM #[id number]. Topic: General Inquiry - Patient Advice  >> Jul 9, 2025 12:34 PM Carri wrote:  Consult/Advisory    Name Of Caller:Mini Marcus      Contact Preference:[phone] (home)     Nature of call:  Pt is having issues about recent diagnoses and would like to be discussed via phone please call.

## 2025-07-10 NOTE — TELEPHONE ENCOUNTER
Called and spoke with the patient  She is updating Dr. Mcdaniel on current medical interventions   Diagnosed with PCOS end of March. 32 cysts found on ovaries  IUD surgically removed. Post op infection - abx didn't notify us (she was in between Stelara injections, she will notify us from now on about future abx)  Prescribed birth control for PCOS  F/U with provider next week  Scheduled 8/25/25 for exploratory sx for endometriosis, biopsies to be taken, possible appendectomy d/t constricting endometriosis   Preop appointment 8/19/25  Surgeon: Dr. Joleen Flores (pelvic pain specialist)  Current IBD meds: Stelara 90 mg SC q 8 weeks (started 5/25/23, LD 5/22, ND 7/11, following dose 9/11)   Dr. Flores is okay with her Stelara doses being taken as they are scheduled in regards to her surgery.   Dr. Mcdaniel to be updated.

## 2025-07-18 ENCOUNTER — OFFICE VISIT (OUTPATIENT)
Dept: PSYCHIATRY | Facility: CLINIC | Age: 20
End: 2025-07-18
Payer: COMMERCIAL

## 2025-07-18 DIAGNOSIS — Z13.39 ADHD (ATTENTION DEFICIT HYPERACTIVITY DISORDER) EVALUATION: ICD-10-CM

## 2025-07-18 DIAGNOSIS — F06.31 DEPRESSION DUE TO PHYSICAL ILLNESS: Primary | ICD-10-CM

## 2025-07-18 DIAGNOSIS — F41.1 GAD (GENERALIZED ANXIETY DISORDER): ICD-10-CM

## 2025-07-18 DIAGNOSIS — E28.2 PCOS (POLYCYSTIC OVARIAN SYNDROME): ICD-10-CM

## 2025-07-18 NOTE — PROGRESS NOTES
PSYCHO-ONCOLOGY NOTE/ Individual Psychotherapy     Date: 7/18/2025   Site:  [facility]      Therapeutic Intervention: Met with patient.  Outpatient - Insight oriented psychotherapy 60 min - CPT code 37140 and Outpatient - Supportive psychotherapy 60 min - CPT Code 84964    This includes face to face time and non-face to face time preparing to see the patient, obtaining and/or reviewing separately obtained history, documenting clinical information in the electronic or other health record, independently interpreting results and communicating results to the patient/family/caregiver, or care coordinator.      Patient was last seen by me on 7/2/2025    Problem list  Patient Active Problem List   Diagnosis    Adolescent idiopathic scoliosis of thoracolumbar region    Menorrhagia with irregular cycle    Classical migraine with intractable migraine with aura    Seasonal allergic rhinitis due to pollen    Crohn's disease (ileum)    ADHD (attention deficit hyperactivity disorder) evaluation    History of ITP S/P splenectomy    Drug-induced insomnia    Adrenal cortical steroids causing adverse effect in therapeutic use    VELASQUEZ (generalized anxiety disorder)    History of transfusion reaction    H/O splenectomy    Numbness and tingling in left hand    Thrombocytosis    Sinusitis    History of cystitis    Immunodeficiency due to long term immunosuppressive drug therapy    PCOS (polycystic ovarian syndrome)       Chief complaint/reason for encounter: depression and interpersonal   Met with patient to evaluate psychosocial adaptation to diagnosis/treatment/survivorship of PCOS and ITP.     Current Medications  Current Outpatient Medications   Medication    aspirin 81 mg Cap    citalopram (CELEXA) 10 MG tablet    clonazePAM (KLONOPIN) 0.5 MG tablet    dicyclomine (BENTYL) 10 MG capsule    ketorolac (TORADOL) 10 mg tablet    levonorgestreL (KYLEENA) 19.5 mg IUD    omeprazole (PRILOSEC) 20 MG capsule    ondansetron (ZOFRAN-ODT) 4  MG TbDL    QUEtiapine (SEROQUEL) 50 MG tablet    serdexmethylphen-dexmethylphen (AZSTARYS) 39.2 mg- 7.8 mg Cap    sulfacetamide sodium-sulfur 10-5 % (w/w) Clsr    ustekinumab (STELARA) 90 mg/mL Syrg syringe    ZILXI 1.5 % Foam     No current facility-administered medications for this visit.       Objective:  Mini Marcus arrived promptly for the session.    Ms. Marcus was independently ambulatory at the time of session. The patient was fully cooperative throughout the session.  Appearance: age appropriate, appropriately  dressed, adequately  groomed  Behavior/Cooperation: friendly and cooperative  Speech: normal in rate, volume, and tone and appropriate quality, quantity and organization of sentences  Mood: sad  Affect: mood congruent  Thought Process: goal-directed, logical  Thought Content: normal,  No delusions or paranoia; did not appear to be responding to internal stimuli during the session  Orientation: grossly intact  Memory: grossly intact  Attention Span/Concentration: Attends to session without distraction; reports no difficulty  Fund of Knowledge: average  Estimate of Intelligence: average from verbal skills and history  Cognition: grossly intact  Insight: patient has awareness of illness; good insight into own behavior and behavior of others  Judgment: the patient's behavior is adequate to circumstances    NCCN Distress thermometer:       10/1/2024     9:56 AM 6/25/2024     2:00 PM 3/21/2024     2:19 PM 1/18/2024     2:39 PM 10/12/2023     2:26 PM 7/17/2023    11:27 AM 5/16/2023     2:20 PM   DISTRESS SCREENING   Distress Score 0 - No Distress 4 5 0 - No Distress 4 0 - No Distress 1   Practical Concerns School;Finances  Work;School  School;Finances  None of these   None of these  None of these    Social Concerns None of these None of these None of these None of these  None of these  None of these    Emotional Concerns Worry or anxiety Worry or anxiety Worry or anxiety None of these  Fears;Nervousness;Worry  None of these  None of these    Retire Spiritual or Buddhist Concerns      No  No    Spiritual or Buddhist Concerns None of these None of these None of these None of these      Physical Concerns Pain;Sleep;Fatigue;Memory or concentration Sleep;Fatigue;Memory or concentration Fatigue None of these  Fatigue;Constipation;Sleep  Fatigue;Constipation        Data saved with a previous flowsheet row definition        Interval history and content of current session: Patient provided an update regarding her overall health and mood. She described ongoing frustration with her support system, noting a pattern of emotional neglect within her relationships. The patient reported difficulty expressing emotions, often feeling dismissed by others, which leads to frustration when they fail to check in on her. Supportive therapeutic techniques were used to validate her emotional experience and to encourage deeper reflection on these challenges. Socratic and open-ended questions were used to help the patient explore how repeated experiences of being dismissed contribute to her emotional withdrawal and strain in relationships. The patient expressed hesitancy in sharing emotions with others, noting that frustration tends to build over time, eventually leading to heightened emotional expression. She was encouraged to reflect on what fosters emotional safety in conversations and to examine the barriers she faces in setting time and intellectual boundaries.     Risk parameters:   Patient reports no suicidal ideation  Patient reports no homicidal ideation  Patient reports no self-injurious behavior  Patient reports no violent behavior     Safety needs:  None at this time      Verbal deficits: None     Patient's response to intervention:The patient's response to intervention is accepting.     Progress toward goals and other mental status changes:  The patient's progress toward goals is good.      Progress to  date:Progress as Expected      Patient Strengths: verbal, intelligent, successful, good insight, commitment to wellness    Treatment Plan:individual psychotherapy and medication management by physician  Target symptoms: depression  Why chosen therapy is appropriate versus another modality: relevant to diagnosis  Outcome monitoring methods: self-report, observation, checklist/rating scale  Therapeutic intervention type: insight oriented psychotherapy, behavior modifying psychotherapy, supportive psychotherapy  Prognosis: Good       Return to clinic: 2 weeks     Length of Service (minutes direct face-to-face contact): 60    Diagnosis:     ICD-10-CM ICD-9-CM   1. Depression due to physical illness  F06.31 293.83   2. VELASQUEZ (generalized anxiety disorder)  F41.1 300.02   3. ADHD (attention deficit hyperactivity disorder) evaluation  Z13.39 V79.8   4. PCOS (polycystic ovarian syndrome)  E28.2 256.4     Mary Bliss PsyD   Clinical Health Psychology Fellow

## 2025-07-28 ENCOUNTER — PATIENT MESSAGE (OUTPATIENT)
Dept: PSYCHIATRY | Facility: CLINIC | Age: 20
End: 2025-07-28
Payer: COMMERCIAL

## 2025-08-01 ENCOUNTER — OFFICE VISIT (OUTPATIENT)
Dept: PSYCHIATRY | Facility: CLINIC | Age: 20
End: 2025-08-01
Payer: COMMERCIAL

## 2025-08-01 DIAGNOSIS — E28.2 PCOS (POLYCYSTIC OVARIAN SYNDROME): ICD-10-CM

## 2025-08-01 DIAGNOSIS — F06.31 DEPRESSION DUE TO PHYSICAL ILLNESS: Primary | ICD-10-CM

## 2025-08-01 DIAGNOSIS — F41.1 GAD (GENERALIZED ANXIETY DISORDER): ICD-10-CM

## 2025-08-01 PROCEDURE — 90837 PSYTX W PT 60 MINUTES: CPT | Mod: S$GLB,,, | Performed by: COUNSELOR

## 2025-08-01 NOTE — PROGRESS NOTES
PSYCHO-ONCOLOGY NOTE/ Individual Psychotherapy     Date: 8/1/2025   Site:  [facility]      Therapeutic Intervention: Met with patient.  Outpatient - Insight oriented psychotherapy 60 min - CPT code 29298, Outpatient - Behavior modifying psychotherapy 60 min - CPT code 79599, and Outpatient - Supportive psychotherapy 60 min - CPT Code 03186    This includes face to face time and non-face to face time preparing to see the patient, obtaining and/or reviewing separately obtained history, documenting clinical information in the electronic or other health record, independently interpreting results and communicating results to the patient/family/caregiver, or care coordinator.      Patient was last seen by me on 7/18/2025    Problem list  Patient Active Problem List   Diagnosis    Adolescent idiopathic scoliosis of thoracolumbar region    Menorrhagia with irregular cycle    Classical migraine with intractable migraine with aura    Seasonal allergic rhinitis due to pollen    Crohn's disease (ileum)    ADHD (attention deficit hyperactivity disorder) evaluation    History of ITP S/P splenectomy    Drug-induced insomnia    Adrenal cortical steroids causing adverse effect in therapeutic use    VELASQUEZ (generalized anxiety disorder)    History of transfusion reaction    H/O splenectomy    Numbness and tingling in left hand    Thrombocytosis    Sinusitis    History of cystitis    Immunodeficiency due to long term immunosuppressive drug therapy    PCOS (polycystic ovarian syndrome)       Chief complaint/reason for encounter: depression and interpersonal   Met with patient to evaluate psychosocial adaptation to diagnosis/treatment/survivorship of PCOS and ITP.     Current Medications  Current Outpatient Medications   Medication    aspirin 81 mg Cap    citalopram (CELEXA) 10 MG tablet    clonazePAM (KLONOPIN) 0.5 MG tablet    dicyclomine (BENTYL) 10 MG capsule    ketorolac (TORADOL) 10 mg tablet    levonorgestreL (KYLEENA) 19.5 mg  IUD    omeprazole (PRILOSEC) 20 MG capsule    ondansetron (ZOFRAN-ODT) 4 MG TbDL    QUEtiapine (SEROQUEL) 50 MG tablet    serdexmethylphen-dexmethylphen (AZSTARYS) 39.2 mg- 7.8 mg Cap    sulfacetamide sodium-sulfur 10-5 % (w/w) Clsr    ustekinumab (STELARA) 90 mg/mL Syrg syringe    ZILXI 1.5 % Foam     No current facility-administered medications for this visit.     Objective:  Mini Marcus arrived promptly for the session. Ms. Marcus was independently ambulatory at the time of session. The patient was fully cooperative throughout the session.  Appearance: age appropriate, appropriately  dressed, adequately  groomed  Behavior/Cooperation: friendly and cooperative  Speech: normal in rate, volume, and tone and appropriate quality, quantity and organization of sentences  Mood: sad  Affect: mood congruent  Thought Process: goal-directed, logical  Thought Content: normal,  No delusions or paranoia; did not appear to be responding to internal stimuli during the session  Orientation: grossly intact  Memory: grossly intact  Attention Span/Concentration: Attends to session without distraction; reports no difficulty  Fund of Knowledge: average  Estimate of Intelligence: average from verbal skills and history  Cognition: grossly intact  Insight: patient has awareness of illness; good insight into own behavior and behavior of others  Judgment: the patient's behavior is adequate to circumstances    NCCN Distress thermometer:       10/1/2024     9:56 AM 6/25/2024     2:00 PM 3/21/2024     2:19 PM 1/18/2024     2:39 PM 10/12/2023     2:26 PM 7/17/2023    11:27 AM 5/16/2023     2:20 PM   DISTRESS SCREENING   Distress Score 0 - No Distress 4 5 0 - No Distress 4 0 - No Distress 1   Practical Concerns School;Finances  Work;School  School;Finances  None of these   None of these  None of these    Social Concerns None of these None of these None of these None of these  None of these  None of these    Emotional Concerns Worry or  anxiety Worry or anxiety Worry or anxiety None of these Fears;Nervousness;Worry  None of these  None of these    Retire Spiritual or Church Concerns      No  No    Spiritual or Church Concerns None of these None of these None of these None of these      Physical Concerns Pain;Sleep;Fatigue;Memory or concentration Sleep;Fatigue;Memory or concentration Fatigue None of these  Fatigue;Constipation;Sleep  Fatigue;Constipation        Data saved with a previous flowsheet row definition        Interval history and content of current session: Pt provided an update regarding mood, overall health, and family dynamics. She described ongoing frustration related to persistent physical pain and feeling unheard by her support system, particularly within family relationships. Supportive therapeutic techniques were used to validate her emotional experience and encourage further exploration of the impact of these dynamics. Pt shared that she has been working to set firmer time and emotional boundaries with her family, which she identified as generally helpful. During session, the tendency to minimize her own needs by justifying or problem-solving her familys behaviors was gently reflected back to her. Humanistic techniques were used to highlight the discrepancy between her desire for emotional safety and the self-dismissive thought patterns that emerged in session. Pt appeared to invalidate her emotional experience by shifting focus away from her own feelings and toward explaining or excusing others actions. Socratic and open-ended questions supported reflection on what it means to trust and validate her own emotional responses. Psychoeducation was provided around emotional boundaries and the importance of honoring ones own perspective, with the goal of strengthening self-trust and reducing internalized self-judgment.      Risk parameters:   Patient reports no suicidal ideation  Patient reports no homicidal ideation  Patient  reports no self-injurious behavior  Patient reports no violent behavior                 Safety needs:  None at this time                            Verbal deficits: None                 Patient's response to intervention:The patient's response to intervention is accepting.                 Progress toward goals and other mental status changes:  The patient's progress toward goals is good.                            Progress to date:Progress as Expected                            Patient Strengths: verbal, intelligent, successful, good insight, commitment to wellness     Treatment Plan:individual psychotherapy and medication management by physician  Target symptoms: depression  Why chosen therapy is appropriate versus another modality: relevant to diagnosis  Outcome monitoring methods: self-report, observation, checklist/rating scale  Therapeutic intervention type: insight oriented psychotherapy, behavior modifying psychotherapy, supportive psychotherapy  Prognosis: Good       Return to clinic: 2 weeks     Length of Service (minutes direct face-to-face contact): 60    Diagnosis:     ICD-10-CM ICD-9-CM   1. Depression due to physical illness  F06.31 293.83   2. VELASQUEZ (generalized anxiety disorder)  F41.1 300.02   3. PCOS (polycystic ovarian syndrome)  E28.2 256.4         Mary Bliss PsyD   Clinical Health Psychology Fellow

## 2025-08-14 ENCOUNTER — OFFICE VISIT (OUTPATIENT)
Dept: PSYCHIATRY | Facility: CLINIC | Age: 20
End: 2025-08-14
Payer: COMMERCIAL

## 2025-08-14 DIAGNOSIS — E28.2 PCOS (POLYCYSTIC OVARIAN SYNDROME): ICD-10-CM

## 2025-08-14 DIAGNOSIS — F06.31 DEPRESSION DUE TO PHYSICAL ILLNESS: Primary | ICD-10-CM

## 2025-08-14 DIAGNOSIS — F41.1 GAD (GENERALIZED ANXIETY DISORDER): ICD-10-CM

## 2025-08-14 PROCEDURE — 90837 PSYTX W PT 60 MINUTES: CPT | Mod: S$GLB,,, | Performed by: COUNSELOR

## 2025-09-02 DIAGNOSIS — D84.821 IMMUNODEFICIENCY DUE TO LONG TERM IMMUNOSUPPRESSIVE DRUG THERAPY: Primary | ICD-10-CM

## 2025-09-02 DIAGNOSIS — Z79.60 IMMUNODEFICIENCY DUE TO LONG TERM IMMUNOSUPPRESSIVE DRUG THERAPY: Primary | ICD-10-CM

## 2025-09-02 DIAGNOSIS — K50.00 CROHN'S DISEASE OF SMALL INTESTINE WITHOUT COMPLICATION: ICD-10-CM

## 2025-09-02 DIAGNOSIS — T45.1X5A IMMUNODEFICIENCY DUE TO LONG TERM IMMUNOSUPPRESSIVE DRUG THERAPY: Primary | ICD-10-CM

## 2025-09-02 RX ORDER — USTEKINUMAB 90 MG/ML
90 INJECTION, SOLUTION SUBCUTANEOUS
Qty: 1 ML | Refills: 0 | Status: ACTIVE | OUTPATIENT
Start: 2025-09-02

## 2025-09-05 ENCOUNTER — OFFICE VISIT (OUTPATIENT)
Dept: PSYCHIATRY | Facility: CLINIC | Age: 20
End: 2025-09-05
Payer: COMMERCIAL

## 2025-09-05 DIAGNOSIS — F41.1 GAD (GENERALIZED ANXIETY DISORDER): ICD-10-CM

## 2025-09-05 DIAGNOSIS — F06.31 DEPRESSION DUE TO PHYSICAL ILLNESS: Primary | ICD-10-CM

## 2025-09-05 DIAGNOSIS — E28.2 PCOS (POLYCYSTIC OVARIAN SYNDROME): ICD-10-CM

## (undated) DEVICE — SYR BULB EAR/ULCER STER 3OZ

## (undated) DEVICE — KIT ANTIFOG

## (undated) DEVICE — TUBING SUC UNIV W/CONN 12FT

## (undated) DEVICE — ADHESIVE MASTISOL VIAL 48/BX

## (undated) DEVICE — TOWEL OR DISP STRL BLUE 4/PK

## (undated) DEVICE — SUT 0 VICRYL / UR6 (J603)

## (undated) DEVICE — SCISSOR 5MMX35CM DIRECT DRIVE

## (undated) DEVICE — DRAPE COLUMN DAVINCI XI

## (undated) DEVICE — SUT V-LOC 180 ABD 2/0 GS-21

## (undated) DEVICE — DRAPE STERI INSTRUMENT 1018

## (undated) DEVICE — OBTURATOR BLADELESS 8MM XI CLR

## (undated) DEVICE — CATH ALL PUR URTHL RR 10FR

## (undated) DEVICE — DRAPE SCOPE PILLOW WARMER

## (undated) DEVICE — LABEL FOR UTILITY MARKER

## (undated) DEVICE — BLADE SURG CARBON STEEL SZ11

## (undated) DEVICE — ELECTRODE REM PLYHSV RETURN 9

## (undated) DEVICE — PENCIL ROCKER SWITCH 10FT CORD

## (undated) DEVICE — COVER TIP CURVED SCISSORS XI

## (undated) DEVICE — ELECTRODE BLADE W/SLEEVE 2.75

## (undated) DEVICE — RELOAD SUREFORM 45 2.5 WHT 6R

## (undated) DEVICE — SUT MCRYL PLUS 4-0 PS2 27IN

## (undated) DEVICE — CLOSURE SKIN STERI STRIP 1/2X4

## (undated) DEVICE — TRAY MINOR GEN SURG OMC

## (undated) DEVICE — DRAPE ABDOMINAL TIBURON 14X11

## (undated) DEVICE — CLIP HEMO-LOK ML

## (undated) DEVICE — SEAL UNIVERSAL 5MM-8MM XI

## (undated) DEVICE — CANNULA SEAL 12MM

## (undated) DEVICE — COVER PROXIMA MAYO STAND

## (undated) DEVICE — COVER LIGHT HANDLE

## (undated) DEVICE — GOWN POLY REINF BRTH SLV XL

## (undated) DEVICE — SYS SMOKE EVACUATION LAP

## (undated) DEVICE — NDL HYPO REG 25G X 1 1/2

## (undated) DEVICE — MARKER SKIN STND TIP BLUE BARR

## (undated) DEVICE — STRAP OR TABLE 5IN X 72IN

## (undated) DEVICE — SUCTION COAGULATOR 10FR 6IN

## (undated) DEVICE — DRAPE THREE-QTR REINF 53X77IN

## (undated) DEVICE — SPONGE GAUZE 16PLY 4X4

## (undated) DEVICE — CUP MEDICINE STERILE 2OZ

## (undated) DEVICE — DRAPE ARM DAVINCI XI